# Patient Record
Sex: FEMALE | Race: BLACK OR AFRICAN AMERICAN | Employment: OTHER | URBAN - METROPOLITAN AREA
[De-identification: names, ages, dates, MRNs, and addresses within clinical notes are randomized per-mention and may not be internally consistent; named-entity substitution may affect disease eponyms.]

---

## 2017-01-11 ENCOUNTER — DOCUMENTATION ONLY (OUTPATIENT)
Dept: INTERNAL MEDICINE CLINIC | Age: 72
End: 2017-01-11

## 2017-03-02 ENCOUNTER — OFFICE VISIT (OUTPATIENT)
Dept: INTERNAL MEDICINE CLINIC | Age: 72
End: 2017-03-02

## 2017-03-02 VITALS
DIASTOLIC BLOOD PRESSURE: 76 MMHG | SYSTOLIC BLOOD PRESSURE: 130 MMHG | RESPIRATION RATE: 18 BRPM | HEART RATE: 89 BPM | WEIGHT: 187.4 LBS | BODY MASS INDEX: 30.12 KG/M2 | HEIGHT: 66 IN | TEMPERATURE: 97.6 F | OXYGEN SATURATION: 98 %

## 2017-03-02 DIAGNOSIS — T14.8XXA ABRASION: Primary | ICD-10-CM

## 2017-03-02 DIAGNOSIS — S83.92XA SPRAIN OF LEFT KNEE, UNSPECIFIED LIGAMENT, INITIAL ENCOUNTER: ICD-10-CM

## 2017-03-02 NOTE — PROGRESS NOTES
Subjective: Laurie Cesar is a 73 y.o. female who presents today with swelling and injury to left leg. She was at a service station yesterday and she misstepped in front of the counter to pay. She did not lose consciousness. She fell on her right shoulder and scraped her left knee. She noted some swelling in her left leg today. No pain with walking. Patient Active Problem List   Diagnosis Code    Hiatal hernia K44.9    History of seasonal allergies Z88.9    Depression F32.9    Anxiety F41.9    Osteoarthritis M19.90    Genital herpes A60.00    S/P arthroscopy of shoulder Z98.890    S/P cholecystectomy Z90.49    S/P LUCA-BSO Z90.710, Z90.722, Z90.79    S/P hemorrhoidectomy Z98.890, Z87.19    Neuropathy G62.9    Screen for colon cancer Z12.11    History of screening mammography Z92.89    S/P endoscopy Z98.890    Hyperlipidemia E78.5    Vitamin D deficiency E55.9    H/O bone density study Z92.89    DDD (degenerative disc disease), cervical M50.30     Current Outpatient Prescriptions   Medication Sig Dispense Refill    diclofenac potassium (CATAFLAM) 50 mg tablet TAKE 1 TABLET THREE TIMES A DAY AS NEEDED 270 Tab 0    sertraline (ZOLOFT) 50 mg tablet TAKE 1 TABLET DAILY 90 Tab 0    Cetirizine (ZYRTEC) 10 mg cap Take  by mouth as needed.  azelastine (ASTELIN) 137 mcg (0.1 %) nasal spray 1 [place] by Both Nostrils route two (2) times a day. Use in each nostril as directed 1 Bottle 5    omeprazole (PRILOSEC) 40 mg capsule Take 40 mg by mouth daily.  Cholecalciferol, Vitamin D3, (VITAMIN D3) 2,000 unit cap capsule Take 2,000 Units by mouth daily.  CARBOXYMETHYLCELLULOSE SODIUM (REFRESH OP) Administer 2 drops to both eyes as needed.  valACYclovir (VALTREX) 1 g tablet Take 0.5 Tabs by mouth two (2) times a day. 90 Tab 1    NASONEX 50 mcg/actuation nasal spray USE 2 SPRAYS IN EACH NOSTRIL DAILY 51 g 1        Review of Systems    Pertinent items are noted in HPI. Objective:     Visit Vitals    /76 (BP 1 Location: Left arm, BP Patient Position: Sitting)    Pulse 89    Temp 97.6 °F (36.4 °C) (Oral)    Resp 18    Ht 5' 5.5\" (1.664 m)    Wt 187 lb 6.4 oz (85 kg)    SpO2 98%    BMI 30.71 kg/m2     General appearance: alert, cooperative, no distress, appears stated age  Extremities: left anterior knee - there are 2 quarter sized abrasions. Granulation tissue already formed on lesions. Small effusion around left knee. Assessment/Plan:     1. Abrasion  -keep area clean and dry. Healing well. 2. Sprain of left knee, unspecified ligament, initial encounter  -elevated when sitting.    -ice for 20 minutes daily for next couple of days  -avoid standing for long periods of time in the next week. Follow-up Disposition:     Return if symptoms worsen or fail to improve. Advised patient to call back or return to office if symptoms worsen/change/persist.     Discussed expected course/resolution/complications of diagnosis in detail with patient. Medication risks/benefits/costs/interactions/alternatives discussed with patient. Patient was given an after visit summary which includes diagnoses, current medications, & vitals. Patient expressed understanding with the diagnosis and plan.

## 2017-03-02 NOTE — MR AVS SNAPSHOT
Visit Information Date & Time Provider Department Dept. Phone Encounter #  
 3/2/2017 12:00 PM MD [facility] [phone] Follow-up Instructions Return if symptoms worsen or fail to improve. Your Appointments 3/29/2017 10:40 AM  
ROUTINE CARE with MD [facility] ([facility]) Appt Note: 6 mths [address], [address] [place]  
[address], [address] [phone] Upcoming Health Maintenance Date Due Hepatitis C Screening 1945 Pneumococcal 65+ Low/Medium Risk (2 of 2 - PCV13) 1/15/2015 GLAUCOMA SCREENING Q2Y 1/8/2016 MEDICARE YEARLY EXAM 10/28/2016 COLONOSCOPY 2/10/2017 BREAST CANCER SCRN MAMMOGRAM 8/21/2017 DTaP/Tdap/Td series (2 - Td) 7/16/2024 Allergies as of 3/2/2017  Review Complete On: 3/2/2017 By: Xiomara Florentino MD  
  
 Severity Noted Reaction Type Reactions Codeine  10/17/2011    Itching Synthetic and GI. Itching under skin. Current Immunizations  Reviewed on 9/29/2016 Name Date Pneumococcal Polysaccharide (PPSV-23) 1/15/2014 TD Vaccine 1/1/1996 Tdap 7/16/2014 Not reviewed this visit Vitals BP  
  
  
  
  
  
 130/76 (BP 1 Location: Left arm, BP Patient Position: Sitting) Vitals History BMI and BSA Data Body Mass Index Body Surface Area 30.71 kg/m 2 1.98 m 2 Preferred Pharmacy Pharmacy Name Phone [id number] AT [address] [place] [address] [phone] Your Updated Medication List  
  
   
This list is accurate as of: 3/2/17 12:44 PM.  Always use your most recent med list.  
  
  
  
  
 azelastine 137 mcg (0.1 %) nasal spray Commonly known as:  ASTELIN  
1 Spray by Both Nostrils route two (2) times a day. Use in each nostril as directed  
  
 diclofenac potassium 50 mg tablet Commonly known as:  CATAFLAM  
TAKE 1 TABLET THREE TIMES A DAY AS NEEDED  
  
 NASONEX 50 mcg/actuation nasal spray Generic drug:  mometasone USE 2 SPRAYS IN EACH NOSTRIL DAILY  
  
 omeprazole 40 mg capsule Commonly known as:  PRILOSEC Take 40 mg by mouth daily. REFRESH OP Administer 2 drops to both eyes as needed. sertraline 50 mg tablet Commonly known as:  ZOLOFT  
TAKE 1 TABLET DAILY  
  
 valACYclovir 1 gram tablet Commonly known as:  VALTREX Take 0.5 Tabs by mouth two (2) times a day. VITAMIN D3 2,000 unit Cap capsule Generic drug:  Cholecalciferol (Vitamin D3) Take 2,000 Units by mouth daily. ZyrTEC 10 mg Cap Generic drug:  Cetirizine Take  by mouth as needed. Follow-up Instructions Return if symptoms worsen or fail to improve. Introducing [facility]! Dear Castro Lakhani: 
Thank you for requesting a [employer] account. Our records indicate that you already have an active [employer] account. You can access your account anytime at [email or web address] Did you know that you can access your hospital and ER discharge instructions at any time in [employer]? You can also review all of your test results from your hospital stay or ER visit. Additional Information If you have questions, please visit the Frequently Asked Questions section of the [employer] website at [email or web address] Remember, [employer] is NOT to be used for urgent needs. For medical emergencies, dial 911. Now available from your iPhone and Android! Please provide this summary of care documentation to your next provider. Your primary care clinician is listed as Sylvia Cuevas. If you have any questions after today's visit, please call [phone].

## 2017-03-02 NOTE — PROGRESS NOTES
Lisa Morgan is a 73 y.o. female  Chief Complaint   Patient presents with   Julien Schwab     yesterday, hurt left leg and shoulder     1. Have you been to the ER, urgent care clinic since your last visit? Hospitalized since your last visit? No    2. Have you seen or consulted any other health care providers outside of the [address] [place] [address] since your last visit? Include any pap smears or colon screening. Dr. Megan Womack, eye doctor, 6-8 weeks ago for annual exam and gluacoma screening.

## 2017-03-29 ENCOUNTER — OFFICE VISIT (OUTPATIENT)
Dept: INTERNAL MEDICINE CLINIC | Age: 72
End: 2017-03-29

## 2017-03-29 ENCOUNTER — HOSPITAL ENCOUNTER (OUTPATIENT)
Dept: LAB | Age: 72
Discharge: HOME | End: 2017-03-29
Payer: MEDICARE

## 2017-03-29 VITALS
SYSTOLIC BLOOD PRESSURE: 112 MMHG | OXYGEN SATURATION: 97 % | WEIGHT: 187.6 LBS | BODY MASS INDEX: 30.15 KG/M2 | DIASTOLIC BLOOD PRESSURE: 70 MMHG | RESPIRATION RATE: 16 BRPM | HEIGHT: 66 IN | HEART RATE: 72 BPM | TEMPERATURE: 97.6 F

## 2017-03-29 DIAGNOSIS — F41.8 DEPRESSION WITH ANXIETY: Primary | ICD-10-CM

## 2017-03-29 DIAGNOSIS — Z11.59 SCREENING FOR VIRAL DISEASE: ICD-10-CM

## 2017-03-29 DIAGNOSIS — E78.2 MIXED HYPERLIPIDEMIA: ICD-10-CM

## 2017-03-29 DIAGNOSIS — Z79.899 ENCOUNTER FOR LONG-TERM (CURRENT) DRUG USE: ICD-10-CM

## 2017-03-29 PROCEDURE — 36415 COLL VENOUS BLD VENIPUNCTURE: CPT

## 2017-03-29 PROCEDURE — 80061 LIPID PANEL: CPT

## 2017-03-29 PROCEDURE — 86803 HEPATITIS C AB TEST: CPT

## 2017-03-29 PROCEDURE — 80053 COMPREHEN METABOLIC PANEL: CPT

## 2017-03-29 PROCEDURE — 85025 COMPLETE CBC W/AUTO DIFF WBC: CPT

## 2017-03-29 RX ORDER — SERTRALINE HYDROCHLORIDE 100 MG/1
100 TABLET, FILM COATED ORAL DAILY
Qty: 90 TAB | Refills: 1 | Status: SHIPPED | OUTPATIENT
Start: 2017-03-29 | End: 2017-09-07 | Stop reason: SDUPTHER

## 2017-03-29 NOTE — PROGRESS NOTES
Chief Complaint   Patient presents with    Depression     6 month f/u      PHQ 2 / 9, over the last two weeks 3/29/2017   Little interest or pleasure in doing things More than half the days   Feeling down, depressed or hopeless Nearly every day   Total Score PHQ 2 5   Trouble falling or staying asleep, or sleeping too much Nearly every day   Feeling tired or having little energy Several days   Poor appetite or overeating Nearly every day   Feeling bad about yourself - or that you are a failure or have let yourself or your family down Not at all   Trouble concentrating on things such as school, work, reading or watching TV More than half the days   Moving or speaking so slowly that other people could have noticed; or the opposite being so fidgety that others notice Not at all   Thoughts of being better off dead, or hurting yourself in some way Not at all   PHQ 9 Score 14   How difficult have these problems made it for you to do your work, take care of your home and get along with others Somewhat difficult

## 2017-03-29 NOTE — PROGRESS NOTES
Subjective: Gay Mcbride is a 73 y.o. female who presents today for follow up of her depression with anxiety. Got prevnar 13 at [facility] on [address] [place] last year. She states that she has been short tempered. She is taking her zoloft. She states that it is better than before taking her zoloft, but still having mood swings and crying spells. She states that she does not have any extraordinary stressors. Patient Active Problem List   Diagnosis Code    Hiatal hernia K44.9    History of seasonal allergies Z88.9    Depression F32.9    Anxiety F41.9    Osteoarthritis M19.90    Genital herpes A60.00    S/P arthroscopy of shoulder Z98.890    S/P cholecystectomy Z90.49    S/P LUCA-BSO Z90.710, Z90.722, Z90.79    S/P hemorrhoidectomy Z98.890, Z87.19    Neuropathy G62.9    Screen for colon cancer Z12.11    History of screening mammography Z92.89    S/P endoscopy Z98.890    Hyperlipidemia E78.5    Vitamin D deficiency E55.9    H/O bone density study Z92.89    DDD (degenerative disc disease), cervical M50.30     Current Outpatient Prescriptions   Medication Sig Dispense Refill    sertraline (ZOLOFT) 100 mg tablet Take 1 Tab by mouth daily. 90 Tab 1    diclofenac potassium (CATAFLAM) 50 mg tablet TAKE 1 TABLET THREE TIMES A DAY AS NEEDED 270 Tab 0    Cetirizine (ZYRTEC) 10 mg cap Take  by mouth as needed.  omeprazole (PRILOSEC) 40 mg capsule Take 40 mg by mouth daily.  Cholecalciferol, Vitamin D3, (VITAMIN D3) 2,000 unit cap capsule Take 2,000 Units by mouth daily.  CARBOXYMETHYLCELLULOSE SODIUM (REFRESH OP) Administer 2 drops to both eyes as needed.  valACYclovir (VALTREX) 1 g tablet Take 0.5 Tabs by mouth two (2) times a day. 90 Tab 1        Review of Systems    Pertinent items are noted in HPI.      Objective:     Visit Vitals    /70 (BP 1 Location: Left arm, BP Patient Position: Sitting)    Pulse 72    Temp 97.6 °F (36.4 °C) (Oral)    Resp 16    Ht 5' 5.5\" (1.664 m)    Wt 187 lb 9.6 oz (85.1 kg)    SpO2 97%    BMI 30.74 kg/m2     General appearance: alert, cooperative, no distress, appears stated age  Head: Normocephalic, without obvious abnormality, atraumatic  Neck: supple, symmetrical, trachea midline, no adenopathy, no carotid bruit and no JVD  Lungs: clear to auscultation bilaterally  Heart: regular rate and rhythm, S1, S2 normal, no murmur, click, rub or gallop  Extremities: extremities normal, atraumatic, no cyanosis or edema  Pulses: 2+ and symmetric  Neurologic: Mental status: Alert, oriented, thought content appropriate, affect: stable    Assessment/Plan:     1. Depression with anxiety  -not well controlled at this time with mood swings and crying spells several days per week. -increase zoloft from 50mg daily to 100mg daily.    - METABOLIC PANEL, COMPREHENSIVE    2. Mixed hyperlipidemia  -diet controlled. - CBC WITH AUTOMATED DIFF  - METABOLIC PANEL, COMPREHENSIVE  - LIPID PANEL    3. Encounter for long-term (current) drug use      4. Screening for viral disease    - HEPATITIS C AB     Orders Placed This Encounter    CBC WITH AUTOMATED DIFF    METABOLIC PANEL, COMPREHENSIVE    LIPID PANEL    HEPATITIS C AB    sertraline (ZOLOFT) 100 mg tablet     Sig: Take 1 Tab by mouth daily. Dispense:  90 Tab     Refill:  1      Follow-up Disposition:     Follow up in 1 month     Return if symptoms worsen or fail to improve. Advised patient to call back or return to office if symptoms worsen/change/persist.     Discussed expected course/resolution/complications of diagnosis in detail with patient. Medication risks/benefits/costs/interactions/alternatives discussed with patient. Patient was given an after visit summary which includes diagnoses, current medications, & vitals. Patient expressed understanding with the diagnosis and plan.

## 2017-03-29 NOTE — MR AVS SNAPSHOT
Visit Information Date & Time Provider Department Dept. Phone Encounter #  
 3/29/2017 10:40 AM Michael Bacon MD [facility] [phone] Follow-up Instructions Return in about 4 weeks (around 4/26/2017). Upcoming Health Maintenance Date Due Hepatitis C Screening 1945 Pneumococcal 65+ Low/Medium Risk (2 of 2 - PCV13) 1/15/2015 GLAUCOMA SCREENING Q2Y 1/8/2016 MEDICARE YEARLY EXAM 10/28/2016 COLONOSCOPY 2/10/2017 BREAST CANCER SCRN MAMMOGRAM 8/21/2017 DTaP/Tdap/Td series (2 - Td) 7/16/2024 Allergies as of 3/29/2017  Review Complete On: 3/29/2017 By: Michael Bacon MD  
  
 Severity Noted Reaction Type Reactions Codeine  10/17/2011    Itching Synthetic and GI. Itching under skin. Current Immunizations  Reviewed on 3/29/2017 Name Date Pneumococcal Polysaccharide (PPSV-23) 1/15/2014 TD Vaccine 1/1/1996 Tdap 7/16/2014 Reviewed by Michael Bacon MD on 3/29/2017 at 11:27 AM  
You Were Diagnosed With   
  
 Codes Comments Depression with anxiety    -  Primary ICD-10-CM: F41.8 ICD-9-CM: 300.4 Mixed hyperlipidemia     ICD-10-CM: E78.2 ICD-9-CM: 272.2 Encounter for long-term (current) drug use     ICD-10-CM: Z79.899 ICD-9-CM: V58.69 Screening for viral disease     ICD-10-CM: Z11.59 
ICD-9-CM: V73.99 Vitals BP Pulse Temp Resp Height(growth percentile) Weight(growth percentile) 112/70 (BP 1 Location: Left arm, BP Patient Position: Sitting) 72 97.6 °F (36.4 °C) (Oral) 16 5' 5.5\" (1.664 m) 187 lb 9.6 oz (85.1 kg) SpO2 BMI OB Status Smoking Status 97% 30.74 kg/m2 Hysterectomy Current Every Day Smoker Vitals History BMI and BSA Data Body Mass Index Body Surface Area 30.74 kg/m 2 1.98 m 2 Preferred Pharmacy Pharmacy Name Phone [address] [facility] [phone] Your Updated Medication List  
  
   
 This list is accurate as of: 3/29/17 11:52 AM.  Always use your most recent med list.  
  
  
  
  
 diclofenac potassium 50 mg tablet Commonly known as:  CATAFLAM  
TAKE 1 TABLET THREE TIMES A DAY AS NEEDED  
  
 omeprazole 40 mg capsule Commonly known as:  PRILOSEC Take 40 mg by mouth daily. REFRESH OP Administer 2 drops to both eyes as needed. sertraline 100 mg tablet Commonly known as:  ZOLOFT Take 1 Tab by mouth daily. valACYclovir 1 gram tablet Commonly known as:  VALTREX Take 0.5 Tabs by mouth two (2) times a day. VITAMIN D3 2,000 unit Cap capsule Generic drug:  Cholecalciferol (Vitamin D3) Take 2,000 Units by mouth daily. ZyrTEC 10 mg Cap Generic drug:  Cetirizine Take  by mouth as needed. Prescriptions Sent to Pharmacy Refills  
 sertraline (ZOLOFT) 100 mg tablet 1 Sig: Take 1 Tab by mouth daily. Class: Normal  
 Pharmacy: [address], [address] Ph #: [phone] Route: Oral  
  
We Performed the Following CBC WITH AUTOMATED DIFF [01374 CPT(R)] HEPATITIS C AB [05585 CPT(R)] LIPID PANEL [15949 CPT(R)] METABOLIC PANEL, COMPREHENSIVE [77254 CPT(R)] Follow-up Instructions Return in about 4 weeks (around 4/26/2017). Introducing [facility]! Dear Nicholas Goldstein: 
Thank you for requesting a [employer] account. Our records indicate that you already have an active [employer] account. You can access your account anytime at [email or web address] Did you know that you can access your hospital and ER discharge instructions at any time in [employer]? You can also review all of your test results from your hospital stay or ER visit. Additional Information If you have questions, please visit the Frequently Asked Questions section of the [employer] website at [email or web address] Remember, [employer] is NOT to be used for urgent needs. For medical emergencies, dial 911. Now available from your iPhone and Android! Please provide this summary of care documentation to your next provider. Your primary care clinician is listed as Sylvia Cuevas. If you have any questions after today's visit, please call [phone].

## 2017-03-30 LAB
ALBUMIN SERPL-MCNC: 4.2 G/DL (ref 3.6–4.8)
ALBUMIN/GLOB SERPL: 1.6 {RATIO} (ref 1.2–2.2)
ALP SERPL-CCNC: 97 IU/L (ref 39–117)
ALT SERPL-CCNC: 9 IU/L (ref 0–32)
AST SERPL-CCNC: 12 IU/L (ref 0–40)
BASOPHILS # BLD AUTO: 0 X10E3/UL (ref 0–0.2)
BASOPHILS NFR BLD AUTO: 0 %
BILIRUB SERPL-MCNC: 0.3 MG/DL (ref 0–1.2)
BUN SERPL-MCNC: 11 MG/DL (ref 8–27)
BUN/CREAT SERPL: 17 (ref 11–26)
CALCIUM SERPL-MCNC: 8.9 MG/DL (ref 8.7–10.3)
CHLORIDE SERPL-SCNC: 102 MMOL/L (ref 96–106)
CHOLEST SERPL-MCNC: 217 MG/DL (ref 100–199)
CO2 SERPL-SCNC: 28 MMOL/L (ref 18–29)
CREAT SERPL-MCNC: 0.65 MG/DL (ref 0.57–1)
EOSINOPHIL # BLD AUTO: 0.2 X10E3/UL (ref 0–0.4)
EOSINOPHIL NFR BLD AUTO: 2 %
ERYTHROCYTE [DISTWIDTH] IN BLOOD BY AUTOMATED COUNT: 15.9 % (ref 12.3–15.4)
GLOBULIN SER CALC-MCNC: 2.7 G/DL (ref 1.5–4.5)
GLUCOSE SERPL-MCNC: 78 MG/DL (ref 65–99)
HCT VFR BLD AUTO: 37.2 % (ref 34–46.6)
HCV AB S/CO SERPL IA: <0.1 S/CO RATIO (ref 0–0.9)
HDLC SERPL-MCNC: 53 MG/DL
HGB BLD-MCNC: 12.1 G/DL (ref 11.1–15.9)
IMM GRANULOCYTES # BLD: 0 X10E3/UL (ref 0–0.1)
IMM GRANULOCYTES NFR BLD: 0 %
INTERPRETATION, 910389: NORMAL
LDLC SERPL CALC-MCNC: 138 MG/DL (ref 0–99)
LYMPHOCYTES # BLD AUTO: 3.2 X10E3/UL (ref 0.7–3.1)
LYMPHOCYTES NFR BLD AUTO: 48 %
MCH RBC QN AUTO: 26.7 PG (ref 26.6–33)
MCHC RBC AUTO-ENTMCNC: 32.5 G/DL (ref 31.5–35.7)
MCV RBC AUTO: 82 FL (ref 79–97)
MONOCYTES # BLD AUTO: 0.4 X10E3/UL (ref 0.1–0.9)
MONOCYTES NFR BLD AUTO: 6 %
NEUTROPHILS # BLD AUTO: 3 X10E3/UL (ref 1.4–7)
NEUTROPHILS NFR BLD AUTO: 44 %
PLATELET # BLD AUTO: 391 X10E3/UL (ref 150–379)
POTASSIUM SERPL-SCNC: 4.3 MMOL/L (ref 3.5–5.2)
PROT SERPL-MCNC: 6.9 G/DL (ref 6–8.5)
RBC # BLD AUTO: 4.53 X10E6/UL (ref 3.77–5.28)
SODIUM SERPL-SCNC: 144 MMOL/L (ref 134–144)
TRIGL SERPL-MCNC: 130 MG/DL (ref 0–149)
VLDLC SERPL CALC-MCNC: 26 MG/DL (ref 5–40)
WBC # BLD AUTO: 6.9 X10E3/UL (ref 3.4–10.8)

## 2017-04-26 ENCOUNTER — OFFICE VISIT (OUTPATIENT)
Dept: INTERNAL MEDICINE CLINIC | Age: 72
End: 2017-04-26

## 2017-04-26 VITALS
DIASTOLIC BLOOD PRESSURE: 70 MMHG | BODY MASS INDEX: 30.05 KG/M2 | TEMPERATURE: 98 F | WEIGHT: 187 LBS | SYSTOLIC BLOOD PRESSURE: 132 MMHG | OXYGEN SATURATION: 98 % | RESPIRATION RATE: 18 BRPM | HEART RATE: 75 BPM | HEIGHT: 66 IN

## 2017-04-26 DIAGNOSIS — Z00.00 ROUTINE GENERAL MEDICAL EXAMINATION AT A HEALTH CARE FACILITY: ICD-10-CM

## 2017-04-26 DIAGNOSIS — F32.A DEPRESSION, UNSPECIFIED DEPRESSION TYPE: Primary | ICD-10-CM

## 2017-04-26 DIAGNOSIS — Z13.39 SCREENING FOR ALCOHOLISM: ICD-10-CM

## 2017-04-26 PROBLEM — Z71.89 ADVANCE DIRECTIVE DISCUSSED WITH PATIENT: Chronic | Status: ACTIVE | Noted: 2017-04-26

## 2017-04-26 PROBLEM — Z71.89 ADVANCE DIRECTIVE DISCUSSED WITH PATIENT: Status: ACTIVE | Noted: 2017-04-26

## 2017-04-26 NOTE — PROGRESS NOTES
Chief Complaint   Patient presents with   [address] Cholesterol Problem    Annual Wellness Visit

## 2017-04-26 NOTE — PROGRESS NOTES
Subjective: Essence Hernández is a 73 y.o. female who presents today for her medicare wellness exam and follow up of her depression. Her zoloft was increased from 50mg to 100mg daily about 1 month ago. She was feeling very tearful and stressed at that time. Since increasing her dose, she is more relaxed and \"herself\" again. She has some congestion in her ears. No fever and chills. Patient Active Problem List   Diagnosis Code    Hiatal hernia K44.9    History of seasonal allergies Z88.9    Depression F32.9    Anxiety F41.9    Osteoarthritis M19.90    Genital herpes A60.00    S/P arthroscopy of shoulder Z98.890    S/P cholecystectomy Z90.49    S/P LUCA-BSO Z90.710, Z90.722, Z90.79    S/P hemorrhoidectomy Z98.890, Z87.19    Neuropathy G62.9    Screen for colon cancer Z12.11    History of screening mammography Z92.89    S/P endoscopy Z98.890    Hyperlipidemia E78.5    Vitamin D deficiency E55.9    H/O bone density study Z92.89    DDD (degenerative disc disease), cervical M50.30     Current Outpatient Prescriptions   Medication Sig Dispense Refill    varicella zoster vacine live (ZOSTAVAX) 19,400 unit/0.65 mL susr injection 1 Vial by SubCUTAneous route once as needed for up to 1 dose. 0.65 mL 0    sertraline (ZOLOFT) 100 mg tablet Take 1 Tab by mouth daily. 90 Tab 1    diclofenac potassium (CATAFLAM) 50 mg tablet TAKE 1 TABLET THREE TIMES A DAY AS NEEDED 270 Tab 0    Cetirizine (ZYRTEC) 10 mg cap Take  by mouth as needed.  omeprazole (PRILOSEC) 40 mg capsule Take 40 mg by mouth daily.  Cholecalciferol, Vitamin D3, (VITAMIN D3) 2,000 unit cap capsule Take 2,000 Units by mouth daily.  CARBOXYMETHYLCELLULOSE SODIUM (REFRESH OP) Administer 2 drops to both eyes as needed.  valACYclovir (VALTREX) 1 g tablet Take 0.5 Tabs by mouth two (2) times a day. 90 Tab 1        Review of Systems    Pertinent items are noted in HPI.      Objective:     Visit Vitals    /70  Pulse 75    Temp 98 °F (36.7 °C) (Oral)    Resp 18    Ht 5' 5.5\" (1.664 m)    Wt 187 lb (84.8 kg)    SpO2 98%    BMI 30.65 kg/m2     General appearance: alert, cooperative, no distress, appears stated age  Head: Normocephalic, without obvious abnormality, atraumatic  Ears: abnormal external canal AD - small amount nonobstructing cerumen in canal, abnormal TM AD - dull, abnormal external canal AS - small amount , nonobstructing cerumen in canal, abnormal TM AS - dull  Nose: Nares normal. Septum midline. Mucosa normal. No drainage or sinus tenderness. Throat: Lips, mucosa, and tongue normal. Teeth and gums normal and normal findings: oropharynx pink & moist without lesions or evidence of thrush  Neck: supple, symmetrical, trachea midline, no adenopathy, no carotid bruit and no JVD  Lungs: clear to auscultation bilaterally  Heart: regular rate and rhythm, S1, S2 normal, no murmur, click, rub or gallop  Neurologic: Mental status: Alert, oriented, thought content appropriate, affect: stable and normal    Assessment/Plan:     1. Depression, unspecified depression type  -depression much improved. - crying spells and mood swings have improved. -will continue her zoloft 100mg daily    2. Routine general medical examination at a health care facility  -medicare wellness exam completed today      Follow-up Disposition:     Follow up in 6 months     Return if symptoms worsen or fail to improve. Advised patient to call back or return to office if symptoms worsen/change/persist.     Discussed expected course/resolution/complications of diagnosis in detail with patient. Medication risks/benefits/costs/interactions/alternatives discussed with patient. Patient was given an after visit summary which includes diagnoses, current medications, & vitals. Patient expressed understanding with the diagnosis and plan. ------------------------------------------------------------------------------------------------------------------------------------------------------------------------------------------  This is a Subsequent Medicare Annual Wellness Visit providing Personalized Prevention Plan Services (PPPS) (Performed 12 months after initial AWV and PPPS )    I have reviewed the patient's medical history in detail and updated the computerized patient record. History     Past Medical History:   Diagnosis Date    Arthritis     DDD (degenerative disc disease), cervical 11/2016    GERD (gastroesophageal reflux disease)     History of screening mammography 5/3/2012    Neuropathy 11/3/2011    S/P arthroscopy of shoulder 11/3/2011    S/P cholecystectomy 11/3/2011    S/P hemorrhoidectomy 11/3/2011    S/P LUCA-BSO 11/3/2011    TMJ pain dysfunction syndrome     Unspecified adverse effect of anesthesia     loss of appetite after shoulder surgery      Past Surgical History:   Procedure Laterality Date    ENDOSCOPY, COLON, DIAGNOSTIC  2001    normal ([facility])    HX SHOULDER REPLACEMENT Bilateral      Current Outpatient Prescriptions   Medication Sig Dispense Refill    varicella zoster vacine live (ZOSTAVAX) 19,400 unit/0.65 mL susr injection 1 Vial by SubCUTAneous route once as needed for up to 1 dose. 0.65 mL 0    sertraline (ZOLOFT) 100 mg tablet Take 1 Tab by mouth daily. 90 Tab 1    diclofenac potassium (CATAFLAM) 50 mg tablet TAKE 1 TABLET THREE TIMES A DAY AS NEEDED 270 Tab 0    Cetirizine (ZYRTEC) 10 mg cap Take  by mouth as needed.  omeprazole (PRILOSEC) 40 mg capsule Take 40 mg by mouth daily.  Cholecalciferol, Vitamin D3, (VITAMIN D3) 2,000 unit cap capsule Take 2,000 Units by mouth daily.  CARBOXYMETHYLCELLULOSE SODIUM (REFRESH OP) Administer 2 drops to both eyes as needed.  valACYclovir (VALTREX) 1 g tablet Take 0.5 Tabs by mouth two (2) times a day.  90 Tab 1     Allergies   Allergen Reactions    Codeine Itching     Synthetic and GI. Itching under skin.      Family History   Problem Relation Age of Onset    Heart Disease Mother     Cancer Mother      liver    Hypertension Father     Stroke Father     Cancer Sister      colon    Cancer Brother      pancreatic     Social History   Substance Use Topics    Smoking status: Current Every Day Smoker     Packs/day: 0.25     Types: Cigarettes    Smokeless tobacco: Never Used    Alcohol use No     Patient Active Problem List   Diagnosis Code    Hiatal hernia K44.9    History of seasonal allergies Z88.9    Depression F32.9    Anxiety F41.9    Osteoarthritis M19.90    Genital herpes A60.00    S/P arthroscopy of shoulder Z98.890    S/P cholecystectomy Z90.49    S/P LUCA-BSO Z90.710, Z90.722, Z90.79    S/P hemorrhoidectomy Z98.890, Z87.19    Neuropathy G62.9    Screen for colon cancer Z12.11    History of screening mammography Z92.89    S/P endoscopy Z98.890    Hyperlipidemia E78.5    Vitamin D deficiency E55.9    H/O bone density study Z92.89    DDD (degenerative disc disease), cervical M50.30       Depression Risk Factor Screening:     PHQ 2 / 9, over the last two weeks 3/29/2017   Little interest or pleasure in doing things More than half the days   Feeling down, depressed or hopeless Nearly every day   Total Score PHQ 2 5   Trouble falling or staying asleep, or sleeping too much Nearly every day   Feeling tired or having little energy Several days   Poor appetite or overeating Nearly every day   Feeling bad about yourself - or that you are a failure or have let yourself or your family down Not at all   Trouble concentrating on things such as school, work, reading or watching TV More than half the days   Moving or speaking so slowly that other people could have noticed; or the opposite being so fidgety that others notice Not at all   Thoughts of being better off dead, or hurting yourself in some way Not at all   PHQ 9 Score 14 How difficult have these problems made it for you to do your work, take care of your home and get along with others Somewhat difficult     Alcohol Risk Factor Screening: On any occasion during the past 3 months, have you had more than 3 drinks containing alcohol? No    Do you average more than 7 drinks per week? No      Functional Ability and Level of Safety:     Hearing Loss   normal-to-mild    Activities of Daily Living   Self-care. Requires assistance with: no ADLs    Fall Risk     Fall Risk Assessment, last 12 mths 3/29/2017   Able to walk? Yes   Fall in past 12 months? Yes   Fall with injury? Yes   Number of falls in past 12 months 1   Fall Risk Score 2     Abuse Screen   Patient is not abused    Review of Systems   A comprehensive review of systems was negative except for that written in the HPI. Physical Examination     Evaluation of Cognitive Function:  Mood/affect:  happy  Appearance: age appropriate  Family member/caregiver input: n/a    See exam noted above. Patient Care Team:  Jcarlos Perez MD as PCP - General (Internal Medicine)  Kashif Spears MD as Physician (Gastroenterology)  Berhane Mayes MD as Physician (Orthopedic Surgery)    Advice/Referrals/Counseling   Education and counseling provided:  Are appropriate based on today's review and evaluation      Assessment/Plan   1. Health Care Maintenance      *Screening mammogram - last done at [facility] in 2015. Over due. Informed patient that I recommend she have this testing done yearly. She will   schedule. *DXA -done 12/2015 - normal  *Screening colonoscopy -Dr. Mylene Tena - done in 2013 - due this year due to family history of colon cancer. Patient will call for appointment  *Eye Exam - completed with Dr. Gt Wen - yearly. Done earlier this year. *Advanced Directive - -patient has an established AD. Will bring copy in for chart. *Immunizations -due for zoster - script given to patient.     Orders Placed This Encounter    varicella zoster vacine live (ZOSTAVAX) 19,400 unit/0.65 mL susr injection     Si Vial by SubCUTAneous route once as needed for up to 1 dose.      Dispense:  0.65 mL     Refill:  0      .

## 2017-04-26 NOTE — MR AVS SNAPSHOT
Visit Information Date & Time Provider Department Dept. Phone Encounter #  
 4/26/2017  9:40 AM Indra Xiong MD [place] [id number] [facility] [phone] [id number] Follow-up Instructions Return in about 6 months (around 10/26/2017). Upcoming Health Maintenance Date Due  
 GLAUCOMA SCREENING Q2Y 1/8/2016 MEDICARE YEARLY EXAM 10/28/2016 COLONOSCOPY 2/10/2017 BREAST CANCER SCRN MAMMOGRAM 8/21/2017 DTaP/Tdap/Td series (2 - Td) 7/16/2024 Allergies as of 4/26/2017  Review Complete On: 4/26/2017 By: Roxanne Salmon LPN Severity Noted Reaction Type Reactions Codeine  10/17/2011    Itching Synthetic and GI. Itching under skin. Current Immunizations  Reviewed on 3/29/2017 Name Date Pneumococcal Conjugate (PCV-13) 11/21/2016 Pneumococcal Polysaccharide (PPSV-23) 1/15/2014 TD Vaccine 1/1/1996 Tdap 7/16/2014 Not reviewed this visit You Were Diagnosed With   
  
 Codes Comments Depression, unspecified depression type    -  Primary ICD-10-CM: F32.9 ICD-9-CM: 467 Vitals BP Pulse Temp Resp Height(growth percentile) Weight(growth percentile) 132/70 75 98 °F (36.7 °C) (Oral) 18 5' 5.5\" (1.664 m) 187 lb (84.8 kg) SpO2 BMI OB Status Smoking Status 98% 30.65 kg/m2 Hysterectomy Current Every Day Smoker Vitals History BMI and BSA Data Body Mass Index Body Surface Area  
 30.65 kg/m 2 1.98 m 2 Preferred Pharmacy Pharmacy Name Phone Harshad Elizalde [facility] [phone] Your Updated Medication List  
  
   
This list is accurate as of: 4/26/17 10:20 AM.  Always use your most recent med list.  
  
  
  
  
 diclofenac potassium 50 mg tablet Commonly known as:  CATAFLAM  
TAKE 1 TABLET THREE TIMES A DAY AS NEEDED  
  
 omeprazole 40 mg capsule Commonly known as:  PRILOSEC Take 40 mg by mouth daily.   
  
 REFRESH OP  
 Administer 2 drops to both eyes as needed. sertraline 100 mg tablet Commonly known as:  ZOLOFT Take 1 Tab by mouth daily. valACYclovir 1 gram tablet Commonly known as:  VALTREX Take 0.5 Tabs by mouth two (2) times a day. VITAMIN D3 2,000 unit Cap capsule Generic drug:  Cholecalciferol (Vitamin D3) Take 2,000 Units by mouth daily. ZyrTEC 10 mg Cap Generic drug:  Cetirizine Take  by mouth as needed. Follow-up Instructions Return in about 6 months (around 10/26/2017). Introducing [facility]! Dear \[facility] [place] [facility]\"": 
Thank you for requesting a Bliips account. Our records indicate that you already have an active Bliips account. You can access your account anytime at [email or web address] Did you know that you can access your hospital and ER discharge instructions at any time in Bliips? You can also review all of your test results from your hospital stay or ER visit. Additional Information If you have questions, please visit the Frequently Asked Questions section of the Bliips website at [email or web address] Remember, Bliips is NOT to be used for urgent needs. For medical emergencies, dial 911. Now available from your iPhone and Android! Please provide this summary of care documentation to your next provider. Your primary care clinician is listed as Sylvia Cuevas. If you have any questions after today's visit, please call [phone].

## 2017-04-28 ENCOUNTER — HOSPITAL ENCOUNTER (OUTPATIENT)
Dept: MAMMOGRAPHY | Age: 72
Discharge: HOME | End: 2017-04-28
Attending: INTERNAL MEDICINE
Payer: MEDICARE

## 2017-04-28 DIAGNOSIS — Z12.31 VISIT FOR SCREENING MAMMOGRAM: ICD-10-CM

## 2017-04-28 PROCEDURE — 77067 SCR MAMMO BI INCL CAD: CPT

## 2017-08-09 RX ORDER — VALACYCLOVIR HYDROCHLORIDE 1 G/1
500 TABLET, FILM COATED ORAL 2 TIMES DAILY
Qty: 90 TAB | Refills: 0 | Status: SHIPPED | OUTPATIENT
Start: 2017-08-09 | End: 2022-10-13

## 2017-08-09 NOTE — TELEPHONE ENCOUNTER
Last office visit- 4/26/17  Next office visit- 10/26/17  Last Refill- 4/28/14    Requested Prescriptions     Pending Prescriptions Disp Refills    valACYclovir (VALTREX) 1 gram tablet 90 Tab 1     Sig: Take 0.5 Tabs by mouth two (2) times a day.

## 2017-08-30 RX ORDER — DICLOFENAC POTASSIUM 50 MG/1
TABLET, FILM COATED ORAL
Qty: 270 TAB | Refills: 0 | Status: SHIPPED | OUTPATIENT
Start: 2017-08-30 | End: 2019-04-10

## 2017-10-17 ENCOUNTER — OFFICE VISIT (OUTPATIENT)
Dept: INTERNAL MEDICINE CLINIC | Age: 72
End: 2017-10-17

## 2017-10-17 VITALS
WEIGHT: 184 LBS | TEMPERATURE: 97.3 F | BODY MASS INDEX: 29.57 KG/M2 | OXYGEN SATURATION: 99 % | SYSTOLIC BLOOD PRESSURE: 130 MMHG | DIASTOLIC BLOOD PRESSURE: 90 MMHG | HEIGHT: 66 IN | HEART RATE: 70 BPM | RESPIRATION RATE: 15 BRPM

## 2017-10-17 DIAGNOSIS — Z79.899 ENCOUNTER FOR LONG-TERM (CURRENT) USE OF MEDICATIONS: ICD-10-CM

## 2017-10-17 DIAGNOSIS — J01.10 ACUTE NON-RECURRENT FRONTAL SINUSITIS: Primary | ICD-10-CM

## 2017-10-17 DIAGNOSIS — F32.A DEPRESSION, UNSPECIFIED DEPRESSION TYPE: ICD-10-CM

## 2017-10-17 RX ORDER — AMOXICILLIN 500 MG/1
500 CAPSULE ORAL 2 TIMES DAILY
Qty: 20 CAP | Refills: 0 | Status: SHIPPED | OUTPATIENT
Start: 2017-10-17 | End: 2017-10-27

## 2017-10-17 RX ORDER — METHYLPREDNISOLONE 4 MG/1
TABLET ORAL
Qty: 1 DOSE PACK | Refills: 0 | Status: SHIPPED | OUTPATIENT
Start: 2017-10-17 | End: 2019-04-10

## 2017-10-17 NOTE — PROGRESS NOTES
Subjective: Jessu Uriarte is a 81 y.o. female who presents today for follow up of her depression and also left side temple head pain. Started about 2 days ago. Has noted some ear congestion and pain as well. Has pain in her right teeth. No fevers or chills. Some congestion. Some visual changes. Hurts to touch left temple    As for her depression, she is quite stable. Going on a [employer] retreat in the next week. She is active with her community and family. She sleeps well and appetite is stable. She is taking her zoloft 100mg daily without any problems. She has a very supportive family. Patient Active Problem List   Diagnosis Code    Hiatal hernia K44.9    History of seasonal allergies Z88.9    Depression F32.9    Anxiety F41.9    Osteoarthritis M19.90    Genital herpes A60.00    S/P arthroscopy of shoulder Z98.890    S/P cholecystectomy Z90.49    S/P LUCA-BSO Z90.710, Z90.722, Z90.79    S/P hemorrhoidectomy Z98.890, Z87.19    Neuropathy G62.9    Screen for colon cancer Z12.11    History of screening mammography Z92.89    S/P endoscopy Z98.890    Hyperlipidemia E78.5    Vitamin D deficiency E55.9    H/O bone density study Z92.89    DDD (degenerative disc disease), cervical M50.30    Advance directive discussed with patient Z71.89     Current Outpatient Prescriptions   Medication Sig Dispense Refill    amoxicillin (AMOXIL) 500 mg capsule Take 1 Cap by mouth two (2) times a day for 10 days. 20 Cap 0    methylPREDNISolone (MEDROL DOSEPACK) 4 mg tablet Use as directed 1 Dose Pack 0    diclofenac potassium (CATAFLAM) 50 mg tablet TAKE 1 TABLET THREE TIMES A DAY AS NEEDED 270 Tab 0    valACYclovir (VALTREX) 1 gram tablet Take 0.5 Tabs by mouth two (2) times a day. 90 Tab 0    omeprazole (PRILOSEC) 40 mg capsule Take 40 mg by mouth daily.  Cholecalciferol, Vitamin D3, (VITAMIN D3) 2,000 unit cap capsule Take 2,000 Units by mouth daily.       CARBOXYMETHYLCELLULOSE SODIUM (REFRESH OP) Administer 2 drops to both eyes as needed.  sertraline (ZOLOFT) 100 mg tablet TAKE 1 TABLET DAILY 90 Tab 0        Review of Systems    Pertinent items are noted in HPI. Objective:     Visit Vitals    /90 (BP 1 Location: Left arm, BP Patient Position: Sitting)    Pulse 70    Temp 97.3 °F (36.3 °C) (Oral)    Resp 15    Ht 5' 5.5\" (1.664 m)    Wt 184 lb (83.5 kg)    SpO2 99%    BMI 30.15 kg/m2     General appearance: alert, cooperative, no distress, appears stated age  Head: Normocephalic, without obvious abnormality, atraumatic  Eyes: negative findings: anicteric sclera, lids and lashes normal and pupils equal, round, reactive to light and accomodation  Ears: abnormal external canal AD - debris in canal, abnormal external canal AS - debris in canal.  Tender to manipulation of left auricle  Nose: Nares normal. Septum midline. Mucosa normal. No drainage or sinus tenderness. Throat: Lips, mucosa, and tongue normal. Teeth and gums normal and normal findings: oropharynx pink & moist without lesions or evidence of thrush  Neck: supple, symmetrical, trachea midline, no adenopathy, no carotid bruit and no JVD  Lungs: clear to auscultation bilaterally  Heart: regular rate and rhythm, S1, S2 normal, no murmur, click, rub or gallop    Assessment/Plan:     1. Acute non-recurrent frontal sinusitis  -treat with course of amoxicillin 500mg bid for 10 days  -medrol dose pack    2. Depression, unspecified depression type  -I evaluated and recommended to continue current doses of medications. 3. Encounter for long-term (current) use of medications    Orders Placed This Encounter    amoxicillin (AMOXIL) 500 mg capsule     Sig: Take 1 Cap by mouth two (2) times a day for 10 days.      Dispense:  20 Cap     Refill:  0    methylPREDNISolone (MEDROL DOSEPACK) 4 mg tablet     Sig: Use as directed     Dispense:  1 Dose Pack     Refill:  0         Follow-up Disposition:     Follow up in 6 months for her depression. Will need labs at that time. Return if symptoms worsen or fail to improve. Advised patient to call back or return to office if symptoms worsen/change/persist.     Discussed expected course/resolution/complications of diagnosis in detail with patient. Medication risks/benefits/costs/interactions/alternatives discussed with patient. Patient was given an after visit summary which includes diagnoses, current medications, & vitals. Patient expressed understanding with the diagnosis and plan.

## 2017-10-17 NOTE — PROGRESS NOTES
Chief Complaint   Patient presents with    Head Pain     Pt c/o lightheadedness and dizziness. Pt states the headache started about 5 days with more pain on the right side. Her eyes are sensitive to light. She also has a tingling sensation in nose and lips. 1. Have you been to the ER, urgent care clinic since your last visit? Hospitalized since your last visit? No    2. Have you seen or consulted any other health care providers outside of the [address] [place] [address] since your last visit? Include any pap smears or colon screening.  No

## 2017-10-17 NOTE — MR AVS SNAPSHOT
Visit Information Date & Time Provider Department Dept. Phone Encounter #  
 10/17/2017 11:00 AM MD [facility] [phone] [id number] Follow-up Instructions Return in about 6 months (around 4/17/2018). Your Appointments 10/26/2017 11:20 AM  
ROUTINE CARE with MD [facility] ([facility]) Appt Note: 6m fu  
 [address], [place] [address] [place] [address]  
[address], [place] [address] [id number] Upcoming Health Maintenance Date Due COLONOSCOPY 2/10/2017 MEDICARE YEARLY EXAM 4/27/2018 GLAUCOMA SCREENING Q2Y 1/1/2019 BREAST CANCER SCRN MAMMOGRAM 4/28/2019 DTaP/Tdap/Td series (2 - Td) 7/16/2024 Allergies as of 10/17/2017  Review Complete On: 10/17/2017 By: Hanna Lambert MD  
  
 Severity Noted Reaction Type Reactions Codeine  10/17/2011    Itching Synthetic and GI. Itching under skin. Current Immunizations  Reviewed on 4/26/2017 Name Date Pneumococcal Conjugate (PCV-13) 11/21/2016 Pneumococcal Polysaccharide (PPSV-23) 1/15/2014 TD Vaccine 1/1/1996 Tdap 7/16/2014 Not reviewed this visit Vitals BP Pulse Temp Resp Height(growth percentile) Weight(growth percentile) 130/90 (BP 1 Location: Left arm, BP Patient Position: Sitting) 70 97.3 °F (36.3 °C) (Oral) 15 5' 5.5\" (1.664 m) 184 lb (83.5 kg) SpO2 BMI OB Status Smoking Status 99% 30.15 kg/m2 Hysterectomy Current Every Day Smoker Vitals History BMI and BSA Data Body Mass Index Body Surface Area  
 30.15 kg/m 2 1.96 m 2 Preferred Pharmacy Pharmacy Name Phone [address] [place] [phone] Your Updated Medication List  
  
   
This list is accurate as of: 10/17/17 11:37 AM.  Always use your most recent med list.  
  
  
  
  
 amoxicillin 500 mg capsule Commonly known as:  AMOXIL Take 1 Cap by mouth two (2) times a day for 10 days. diclofenac potassium 50 mg tablet Commonly known as:  CATAFLAM  
TAKE 1 TABLET THREE TIMES A DAY AS NEEDED  
  
 methylPREDNISolone 4 mg tablet Commonly known as:  Danella Zulma Use as directed  
  
 omeprazole 40 mg capsule Commonly known as:  PRILOSEC Take 40 mg by mouth daily. REFRESH OP Administer 2 drops to both eyes as needed. sertraline 100 mg tablet Commonly known as:  ZOLOFT  
TAKE 1 TABLET DAILY  
  
 valACYclovir 1 gram tablet Commonly known as:  VALTREX Take 0.5 Tabs by mouth two (2) times a day. VITAMIN D3 2,000 unit Cap capsule Generic drug:  Cholecalciferol (Vitamin D3) Take 2,000 Units by mouth daily. Prescriptions Sent to Pharmacy Refills  
 amoxicillin (AMOXIL) 500 mg capsule 0 Sig: Take 1 Cap by mouth two (2) times a day for 10 days. Class: Normal  
 Pharmacy: [address] Ph #: [phone] Route: Oral  
 methylPREDNISolone (MEDROL DOSEPACK) 4 mg tablet 0 Sig: Use as directed Class: Normal  
 Pharmacy: [address] Ph #: [phone] Follow-up Instructions Return in about 6 months (around 4/17/2018). Introducing [facility]! Dear [facility]: 
Thank you for requesting a Amen. account. Our records indicate that you already have an active Amen. account. You can access your account anytime at [email or web address] Did you know that you can access your hospital and ER discharge instructions at any time in Amen.? You can also review all of your test results from your hospital stay or ER visit. Additional Information If you have questions, please visit the Frequently Asked Questions section of the Amen. website at [email or web address] Remember, [employer] is NOT to be used for urgent needs. For medical emergencies, dial 911. Now available from your iPhone and Android! Please provide this summary of care documentation to your next provider. Your primary care clinician is listed as Sylvia Cuevas. If you have any questions after today's visit, please call [phone].

## 2018-01-31 ENCOUNTER — HOSPITAL ENCOUNTER (OUTPATIENT)
Dept: MRI IMAGING | Age: 73
Discharge: HOME | End: 2018-01-31
Attending: PHYSICAL MEDICINE & REHABILITATION
Payer: MEDICARE

## 2018-01-31 DIAGNOSIS — M54.42 BILATERAL LOW BACK PAIN WITH LEFT-SIDED SCIATICA: ICD-10-CM

## 2018-01-31 DIAGNOSIS — R20.1 HYPOESTHESIA OF SKIN: ICD-10-CM

## 2018-01-31 DIAGNOSIS — M54.41 BILATERAL LOW BACK PAIN WITH RIGHT-SIDED SCIATICA: ICD-10-CM

## 2018-01-31 DIAGNOSIS — M47.817 LUMBOSACRAL SPONDYLOSIS WITHOUT MYELOPATHY: ICD-10-CM

## 2018-01-31 DIAGNOSIS — M54.2 CERVICALGIA: ICD-10-CM

## 2018-01-31 DIAGNOSIS — M43.16 SPONDYLOLISTHESIS OF LUMBAR REGION: ICD-10-CM

## 2018-01-31 DIAGNOSIS — M47.812 CERVICAL SPONDYLOSIS WITHOUT MYELOPATHY: ICD-10-CM

## 2018-01-31 PROCEDURE — 72148 MRI LUMBAR SPINE W/O DYE: CPT

## 2018-01-31 PROCEDURE — 72141 MRI NECK SPINE W/O DYE: CPT

## 2018-03-26 ENCOUNTER — HOSPITAL ENCOUNTER (OUTPATIENT)
Dept: MRI IMAGING | Age: 73
Discharge: HOME | End: 2018-03-26
Attending: INTERNAL MEDICINE
Payer: MEDICARE

## 2018-03-26 DIAGNOSIS — N39.498 OTHER URINARY INCONTINENCE: ICD-10-CM

## 2018-03-26 DIAGNOSIS — R41.3 MEMORY LOSS: ICD-10-CM

## 2018-03-26 DIAGNOSIS — R26.89 BALANCE PROBLEM: ICD-10-CM

## 2018-03-26 PROCEDURE — 70551 MRI BRAIN STEM W/O DYE: CPT

## 2019-04-10 ENCOUNTER — OFFICE VISIT (OUTPATIENT)
Dept: NEUROLOGY | Age: 74
End: 2019-04-10

## 2019-04-10 VITALS
DIASTOLIC BLOOD PRESSURE: 80 MMHG | SYSTOLIC BLOOD PRESSURE: 120 MMHG | HEIGHT: 66 IN | HEART RATE: 88 BPM | OXYGEN SATURATION: 98 % | RESPIRATION RATE: 14 BRPM | BODY MASS INDEX: 29.73 KG/M2 | WEIGHT: 185 LBS

## 2019-04-10 DIAGNOSIS — E53.8 B12 DEFICIENCY: ICD-10-CM

## 2019-04-10 DIAGNOSIS — D47.2 MGUS (MONOCLONAL GAMMOPATHY OF UNKNOWN SIGNIFICANCE): ICD-10-CM

## 2019-04-10 DIAGNOSIS — G63 POLYNEUROPATHY ASSOCIATED WITH UNDERLYING DISEASE (HCC): Primary | ICD-10-CM

## 2019-04-10 DIAGNOSIS — M72.2 PLANTAR FASCIITIS: ICD-10-CM

## 2019-04-10 RX ORDER — LISINOPRIL 5 MG/1
10 TABLET ORAL EVERY MORNING
COMMUNITY
Start: 2019-04-02 | End: 2022-10-13

## 2019-04-10 RX ORDER — DICLOFENAC SODIUM 75 MG/1
75 TABLET, DELAYED RELEASE ORAL 2 TIMES DAILY
COMMUNITY
Start: 2019-04-02 | End: 2022-03-11

## 2019-04-10 RX ORDER — CYANOCOBALAMIN 1000 UG/ML
1000 INJECTION, SOLUTION INTRAMUSCULAR; SUBCUTANEOUS
COMMUNITY
Start: 2019-01-09 | End: 2022-10-13

## 2019-04-10 RX ORDER — GABAPENTIN 100 MG/1
CAPSULE ORAL 3 TIMES DAILY
Status: ON HOLD | COMMUNITY
Start: 2019-04-03 | End: 2021-05-27

## 2019-04-10 RX ORDER — ATORVASTATIN CALCIUM 20 MG/1
20 TABLET, FILM COATED ORAL DAILY
COMMUNITY
Start: 2019-04-02

## 2019-04-10 RX ORDER — AMITRIPTYLINE HYDROCHLORIDE 25 MG/1
50 TABLET, FILM COATED ORAL
COMMUNITY
Start: 2019-02-28 | End: 2019-07-25 | Stop reason: SDUPTHER

## 2019-04-10 NOTE — PROGRESS NOTES
Ms. Dexter Dolan presents as a new patient for evaluation of numbness and tingling of hands and feet.

## 2019-04-10 NOTE — PROGRESS NOTES
Neurology Consult Note      HISTORY PROVIDED BY: patient    Chief Complaint:   Chief Complaint   Patient presents with    Neurologic Problem      Subjective: Sandralee Dubin is a 72 y.o. left handed female who presents in consultation for a second opinion regarding neuropathy. Pt reports onset of sxs 3 years ago. She saw her PCP who suggested she had a food allergy. She was seen by Dr. Arpita Vail and was found to have PN and was referred to a neurologist at [address] [place] [address]. She was tried on gabapentin which caused swelling and did not help  She is now on amitriptyline 25mg qhs and just had gabapentin added. She reports tingling and numbnes in face, nose, cheek bones, hands in their entirety, and feet to ankles. Has burning pain in mornings on bottom of feet, feels like she is walking on hot coals. Pain improves as she is up and moving, doesn't resolve completely. She does not experience pain at night. She was found to have B12 deficiency about a year ago, no h/o DM, she did have work up for other causes of neuropathy by the neurologist, was found to have \"an abnormal protein\" and sent to hematology. The hematologist told her that she has MGUS, she has yearly f/u. No h/o cancer. Pt c/o memory loss, did have nocturia but improved. Notes from [facility] requested and received: Office visit 3/15/2019 patient complained of increased paresthesias in her hands and feet for the past several weeks. Occasionally drops objects. Mentions recent hematology appointment with Dr. Scott Mario and was told her MGUS was stable. She was on amitriptyline 50 mg nightly gabapentin was added 100 mg 3 times daily. Other notes mention failed duloxetine. Had been on gabapentin 600mg tid causing confusion and hand and feet swelling.   First office visit 4/3/2018 -mentions MRI brain without contrast 3/26/2018 was normal.  Reference is a report from an EMG done at [address] revealing sensory greater than motor axonal and demyelinating polyneuropathy without evidence of cervical radiculopathy. Labs 7/13/2018 B12 547, SPEP with positive M spike 0.2, , homocystine 10.4. Notes from [facility]-initial consultation 8/10/2018 with Esperanza Gohtra MD- MGUS diagnosed. Labs 2/27/19 CBC unremarkable 3/5/19 CMP-sodium 146 otherwise unremarkable. Bone survey 8/17/2018-no lytic or blastic lesion seen degenerative changes in the spine specially and thoracic spine seen. Notes from PCP Dr. Alen Reid -office visit 3/22/2019: Noted to continue to receive B12 injections. Labs 2/22/2019 CMP unremarkable hemoglobin A1c 5.4, , free T4 1.08, TSH 1.07. Lab 1/9/2018 B12 100.     Past Medical History:   Diagnosis Date    Arthritis     B12 deficiency     DDD (degenerative disc disease), cervical 11/2016    GERD (gastroesophageal reflux disease)     History of screening mammography 5/3/2012    MGUS (monoclonal gammopathy of unknown significance)     Neuropathy 11/3/2011    S/P arthroscopy of shoulder 11/3/2011    S/P cholecystectomy 11/3/2011    S/P hemorrhoidectomy 11/3/2011    S/P LUCA-BSO 11/3/2011    TMJ pain dysfunction syndrome     Unspecified adverse effect of anesthesia     loss of appetite after shoulder surgery      Past Surgical History:   Procedure Laterality Date    ENDOSCOPY, COLON, DIAGNOSTIC  2001    normal ([facility])    HX SHOULDER REPLACEMENT Bilateral       Social History     Socioeconomic History    Marital status:      Spouse name: Not on file    Number of children: Not on file    Years of education: Not on file    Highest education level: Not on file   Occupational History    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   Social Needs    Financial resource strain: Not on file    Food insecurity:     Worry: Not on file     Inability: Not on file    Transportation needs:     Medical: Not on file     Non-medical: Not on file   Tobacco Use    Smoking status: Current Every Day Smoker     Packs/day: 0.25 Types: Cigarettes    Smokeless tobacco: Never Used   Substance and Sexual Activity    Alcohol use: No    Drug use: No    Sexual activity: Never   Lifestyle    Physical activity:     Days per week: Not on file     Minutes per session: Not on file    Stress: Not on file   Relationships    Social connections:     Talks on phone: Not on file     Gets together: Not on file     Attends [employer]ism service: Not on file     Active member of club or organization: Not on file     Attends meetings of clubs or organizations: Not on file     Relationship status: Not on file    Intimate partner violence:     Fear of current or ex partner: Not on file     Emotionally abused: Not on file     Physically abused: Not on file     Forced sexual activity: Not on file   Other Topics Concern    Not on file   Social History Narrative    Lives in [place] alone, GD goes to [employer] and visits on weekends     Family History   Problem Relation Age of Onset    Heart Disease Mother     Cancer Mother         liver    Hypertension Father     Stroke Father     Cancer Sister         colon    Cancer Brother         pancreatic    Breast Cancer Daughter 39    Breast Cancer Niece 61    Other Son         Dec drug OD         Objective:   Review of Systems   Constitutional: Negative. Poor appetite    HENT: Positive for tinnitus. Eyes: Negative. Spots in vision   Respiratory: Positive for shortness of breath. Snoring   Cardiovascular: Negative. Gastrointestinal: Negative. Genitourinary: Negative. Musculoskeletal: Positive for falls and joint pain. Skin: Negative. Neurological: Positive for dizziness, sensory change and weakness. Endo/Heme/Allergies: Negative. Psychiatric/Behavioral: Positive for memory loss. Allergies   Allergen Reactions    Codeine Itching     Synthetic and GI. Itching under skin.         Meds:  Outpatient Medications Prior to Visit   Medication Sig Dispense Refill    diclofenac potassium (CATAFLAM) 50 mg tablet TAKE 1 TABLET THREE TIMES A DAY AS NEEDED 270 Tab 0    valACYclovir (VALTREX) 1 gram tablet Take 0.5 Tabs by mouth two (2) times a day. 90 Tab 0    omeprazole (PRILOSEC) 40 mg capsule Take 40 mg by mouth daily.  Cholecalciferol, Vitamin D3, (VITAMIN D3) 2,000 unit cap capsule Take 2,000 Units by mouth daily.  CARBOXYMETHYLCELLULOSE SODIUM (REFRESH OP) Administer 2 drops to both eyes as needed.  sertraline (ZOLOFT) 100 mg tablet TAKE 1 TABLET DAILY 90 Tab 1    methylPREDNISolone (MEDROL DOSEPACK) 4 mg tablet Use as directed 1 Dose Pack 0     No facility-administered medications prior to visit. Imaging:  MRI Results (most recent):  Results from Hospital Encounter encounter on 03/26/18   MRI BRAIN WO CONT    Narrative EXAM:  MRI BRAIN WO CONT  INDICATION:  Other urinary incontinence, M 39.498, R 41.3, balance problem, R  26.89, pain in back of head. TECHNIQUE: Sagittal T1, axial FLAIR, T2,T1 and gradient echo images as well as  coronal T2 weighted images and axial diffusion weighted images of the head were  obtained. COMPARISON:  CT head 10/11/10  FINDINGS:  The ventricular size and configuration are normal.   No areas of abnormal signal in the cerebral hemispheres, brainstem or  cerebellum. No evidence of hemorrhage, mass, infarct or abnormal extra-axial fluid  collections. Flow voids are present in the vertebral basilar and carotid artery systems. The craniocervical junction is normal.   The structures at the cranial base including paranasal sinuses are unremarkable. Impression IMPRESSION: Normal MRI of the head. CT Results (most recent):     Results from [place] encounter on 10/11/10   CT HEAD WITHOUT CONTRAST    Narrative Final Report       ICD Codes / Adm. Diagnosis:    /   HEADACHE  Examination:  HEAD WO CON  - [id number] - Oct 11 2010 10:09AM  Accession No:  [id number]  Reason:  REASON: STROKE      REPORT:  Multiple axial images were obtained from the skull base to the vertex   without the use of intravenous contrast. Ventricles and sulci are normal for   patient's age. There is no midline shift or herniation. The examination is   negative for acute infarct, mass lesion, or hemorrhage. The visualized   portions of the petrous temporal bones, paranasal sinuses, and orbits are   unremarkable. IMPRESSION: No acute process. Interpreting/Reading Doctor: Shaggy Patel ([id number])  Transcribed: n/a on 10/11/2010  Approved: Shaggy Patel ([id number])  10/11/2010  Distribution:  Attending Doctor: Yajaira DIAZ   Alternate Doctor: Yajaira Jurado. JOE            Reviewed records in depict and Vault[facility] tab today    Lab Review   Results for orders placed or performed in visit on 03/29/17   CBC WITH AUTOMATED DIFF   Result Value Ref Range    WBC 6.9 3.4 - 10.8 x10E3/uL    RBC 4.53 3.77 - 5.28 x10E6/uL    HGB 12.1 11.1 - 15.9 g/dL    HCT 37.2 34.0 - 46.6 %    MCV 82 79 - 97 fL    MCH 26.7 26.6 - 33.0 pg    MCHC 32.5 31.5 - 35.7 g/dL    RDW 15.9 (H) 12.3 - 15.4 %    PLATELET 493 (H) 620 - 379 x10E3/uL    NEUTROPHILS 44 %    Lymphocytes 48 %    MONOCYTES 6 %    EOSINOPHILS 2 %    BASOPHILS 0 %    ABS. NEUTROPHILS 3.0 1.4 - 7.0 x10E3/uL    Abs Lymphocytes 3.2 (H) 0.7 - 3.1 x10E3/uL    ABS. MONOCYTES 0.4 0.1 - 0.9 x10E3/uL    ABS. EOSINOPHILS 0.2 0.0 - 0.4 x10E3/uL    ABS. BASOPHILS 0.0 0.0 - 0.2 x10E3/uL    IMMATURE GRANULOCYTES 0 %    ABS. IMM.  GRANS. 0.0 0.0 - 0.1 S38Z7/VH   METABOLIC PANEL, COMPREHENSIVE   Result Value Ref Range    Glucose 78 65 - 99 mg/dL    BUN 11 8 - 27 mg/dL    Creatinine 0.65 0.57 - 1.00 mg/dL    GFR est non-AA 91 >59 mL/min/1.73    GFR est  >59 mL/min/1.73    BUN/Creatinine ratio 17 11 - 26    Sodium 144 134 - 144 mmol/L    Potassium 4.3 3.5 - 5.2 mmol/L    Chloride 102 96 - 106 mmol/L    CO2 28 18 - 29 mmol/L    Calcium 8.9 8.7 - 10.3 mg/dL    Protein, total 6.9 6.0 - 8.5 g/dL    Albumin 4.2 3.6 - 4.8 g/dL    GLOBULIN, TOTAL 2.7 1.5 - 4.5 g/dL    A-G Ratio 1.6 1.2 - 2.2    Bilirubin, total 0.3 0.0 - 1.2 mg/dL    Alk. phosphatase 97 39 - 117 IU/L    AST (SGOT) 12 0 - 40 IU/L    ALT (SGPT) 9 0 - 32 IU/L   LIPID PANEL   Result Value Ref Range    Cholesterol, total 217 (H) 100 - 199 mg/dL    Triglyceride 130 0 - 149 mg/dL    HDL Cholesterol 53 >39 mg/dL    VLDL, calculated 26 5 - 40 mg/dL    LDL, calculated 138 (H) 0 - 99 mg/dL   HEPATITIS C AB   Result Value Ref Range    Hep C Virus Ab <0.1 0.0 - 0.9 s/co ratio   CVD REPORT   Result Value Ref Range    INTERPRETATION Note         Exam:  Visit Vitals  /80   Pulse 88   Resp 14   Ht 5' 5.5\" (1.664 m)   Wt 83.9 kg (185 lb)   SpO2 98%   BMI 30.32 kg/m²     General:  Alert, cooperative, no distress. Head:  Normocephalic, without obvious abnormality, atraumatic. Respiratory:  Heart:   Non labored breathing  Regular rate and rhythm, no murmurs   Neck:   2+ carotids, no bruits   Extremities: Warm, no cyanosis or edema. Tender to palpation on dorsum of feet   Pulses: 2+ radial pulses. Neurologic:  MS: Alert and oriented x 4, speech intact. Language - word finding, able to name repeat and follow commands. Attention and fund of knowledge appropriate. Recent and remote memory intact.   Cranial Nerves:  II: visual fields Full to confrontation   II: pupils Equal, round, reactive to light   II: optic disc    III,VII: ptosis none   III,IV,VI: extraocular muscles  EOMI, no nystagmus or diplopia   V: facial light touch sensation  normal   VII: facial muscle function   symmetric   VIII: hearing intact   IX: soft palate elevation  normal   XI: trapezius strength  5/5   XI: sternocleidomastoid strength 5/5   XII: tongue  Midline     Motor: normal bulk and tone, no tremor, Strength: 5/5 throughout, no PD  Sensory: intact to LT, PP - increased to PP in feet compared to hands  Coordination: FTN and HTS intact, DORIS intact,Romberg negative  Gait: normal gait, able to tandem walk  Reflexes: 2+ symmetric in UE, 1+ in LE, toes downgoing       Assessment/Plan   Pt is a 72 y.o. left handed female with onset of sxs 3 years ago, tingling and numbnes in face, nose, cheek bones, hands in their entirety, and feet to ankles. Has burning pain in mornings on bottom of feet, feels like she is walking on hot coals. Pain improves as she is up and moving, doesn't resolve completely, no pain at night. 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 She was found to have B12 deficiency and MGUS. Exam-tenderness to palpation on the dorsal surface of her feet, decreased PP in feet compared to hands, diminished lower extremity reflexes. We discussed the diagnosis of peripheral neuropathy, treatment of underlying cause to prevent progression, and the use of neuropathic pain medicines such as amitriptyline and gabapentin, to control the burning pain. The pain she describes in the morning that improves after walking is somewhat suggestive of plantar fasciitis rather than peripheral neuropathy. Recommend physical therapy for plantar fasciitis to see if she appreciates relief of her symptoms. Continue amitriptyline and gabapentin for now. Follow-up in clinic in 3 months, instructed to call in the interim with any questions or concerns. ICD-10-CM ICD-9-CM    1. Polyneuropathy associated with underlying disease ([facility] [address] G63 357.4    2. MGUS (monoclonal gammopathy of unknown significance) D47.2 273.1    3. B12 deficiency E53.8 266.2    4. Plantar fasciitis M72.2 728.71 REFERRAL TO PHYSICAL THERAPY       Signed:   Dennis Chavez MD  4/10/2019

## 2019-04-10 NOTE — LETTER
4/28/19 Patient: Jasmyne Maradiaga YOB: 1945 Date of Visit: 4/10/2019 Valentina Maciel MD 
[facility] [address] [facility] [address] [id number] VIA Facsimile: [phone] Dear Valentina Maciel MD, Thank you for referring Ms. Paige Jerome to [address] [place] [address] for evaluation. My notes for this consultation are attached. If you have questions, please do not hesitate to call me. I look forward to following your patient along with you. Sincerely, Andrea Landa MD

## 2019-04-29 ENCOUNTER — DOCUMENTATION ONLY (OUTPATIENT)
Dept: NEUROLOGY | Age: 74
End: 2019-04-29

## 2019-06-25 ENCOUNTER — ANESTHESIA (OUTPATIENT)
Dept: ENDOSCOPY | Age: 74
End: 2019-06-25
Payer: MEDICARE

## 2019-06-25 ENCOUNTER — HOSPITAL ENCOUNTER (OUTPATIENT)
Age: 74
Setting detail: OUTPATIENT SURGERY
Discharge: HOME | End: 2019-06-25
Attending: INTERNAL MEDICINE | Admitting: INTERNAL MEDICINE
Payer: MEDICARE

## 2019-06-25 ENCOUNTER — ANESTHESIA EVENT (OUTPATIENT)
Dept: ENDOSCOPY | Age: 74
End: 2019-06-25
Payer: MEDICARE

## 2019-06-25 VITALS
DIASTOLIC BLOOD PRESSURE: 65 MMHG | TEMPERATURE: 96.8 F | SYSTOLIC BLOOD PRESSURE: 128 MMHG | RESPIRATION RATE: 15 BRPM | WEIGHT: 185 LBS | HEART RATE: 74 BPM | BODY MASS INDEX: 30.32 KG/M2 | OXYGEN SATURATION: 100 %

## 2019-06-25 PROCEDURE — 74011250636 HC RX REV CODE- 250/636

## 2019-06-25 PROCEDURE — 74011250637 HC RX REV CODE- 250/637: Performed by: INTERNAL MEDICINE

## 2019-06-25 PROCEDURE — 76060000031 HC ANESTHESIA FIRST 0.5 HR: Performed by: INTERNAL MEDICINE

## 2019-06-25 PROCEDURE — 76040000019: Performed by: INTERNAL MEDICINE

## 2019-06-25 PROCEDURE — 77030027957 HC TBNG IRR ENDOGTR BUSS -B: Performed by: INTERNAL MEDICINE

## 2019-06-25 RX ORDER — SODIUM CHLORIDE 0.9 % (FLUSH) 0.9 %
5-40 SYRINGE (ML) INJECTION AS NEEDED
Status: DISCONTINUED | OUTPATIENT
Start: 2019-06-25 | End: 2019-06-25 | Stop reason: HOSPADM

## 2019-06-25 RX ORDER — ATROPINE SULFATE 0.1 MG/ML
0.5 INJECTION INTRAVENOUS
Status: DISCONTINUED | OUTPATIENT
Start: 2019-06-25 | End: 2019-06-25 | Stop reason: HOSPADM

## 2019-06-25 RX ORDER — PROPOFOL 10 MG/ML
INJECTION, EMULSION INTRAVENOUS AS NEEDED
Status: DISCONTINUED | OUTPATIENT
Start: 2019-06-25 | End: 2019-06-25 | Stop reason: HOSPADM

## 2019-06-25 RX ORDER — FENTANYL CITRATE 50 UG/ML
25-200 INJECTION, SOLUTION INTRAMUSCULAR; INTRAVENOUS
Status: DISCONTINUED | OUTPATIENT
Start: 2019-06-25 | End: 2019-06-25 | Stop reason: HOSPADM

## 2019-06-25 RX ORDER — MIDAZOLAM HYDROCHLORIDE 1 MG/ML
.25-5 INJECTION, SOLUTION INTRAMUSCULAR; INTRAVENOUS
Status: DISCONTINUED | OUTPATIENT
Start: 2019-06-25 | End: 2019-06-25 | Stop reason: HOSPADM

## 2019-06-25 RX ORDER — NALOXONE HYDROCHLORIDE 0.4 MG/ML
0.4 INJECTION, SOLUTION INTRAMUSCULAR; INTRAVENOUS; SUBCUTANEOUS
Status: DISCONTINUED | OUTPATIENT
Start: 2019-06-25 | End: 2019-06-25 | Stop reason: HOSPADM

## 2019-06-25 RX ORDER — DEXTROMETHORPHAN/PSEUDOEPHED 2.5-7.5/.8
1.2 DROPS ORAL
Status: DISCONTINUED | OUTPATIENT
Start: 2019-06-25 | End: 2019-06-25 | Stop reason: HOSPADM

## 2019-06-25 RX ORDER — SODIUM CHLORIDE 9 MG/ML
50 INJECTION, SOLUTION INTRAVENOUS CONTINUOUS
Status: DISCONTINUED | OUTPATIENT
Start: 2019-06-25 | End: 2019-06-25 | Stop reason: HOSPADM

## 2019-06-25 RX ORDER — SODIUM CHLORIDE 0.9 % (FLUSH) 0.9 %
5-40 SYRINGE (ML) INJECTION EVERY 8 HOURS
Status: DISCONTINUED | OUTPATIENT
Start: 2019-06-25 | End: 2019-06-25 | Stop reason: HOSPADM

## 2019-06-25 RX ORDER — FLUMAZENIL 0.1 MG/ML
0.2 INJECTION INTRAVENOUS
Status: DISCONTINUED | OUTPATIENT
Start: 2019-06-25 | End: 2019-06-25 | Stop reason: HOSPADM

## 2019-06-25 RX ORDER — EPINEPHRINE 0.1 MG/ML
1 INJECTION INTRACARDIAC; INTRAVENOUS
Status: DISCONTINUED | OUTPATIENT
Start: 2019-06-25 | End: 2019-06-25 | Stop reason: HOSPADM

## 2019-06-25 RX ORDER — LIDOCAINE HYDROCHLORIDE 20 MG/ML
INJECTION, SOLUTION EPIDURAL; INFILTRATION; INTRACAUDAL; PERINEURAL AS NEEDED
Status: DISCONTINUED | OUTPATIENT
Start: 2019-06-25 | End: 2019-06-25 | Stop reason: HOSPADM

## 2019-06-25 RX ADMIN — PROPOFOL 25 MG: 10 INJECTION, EMULSION INTRAVENOUS at 10:15

## 2019-06-25 RX ADMIN — PROPOFOL 25 MG: 10 INJECTION, EMULSION INTRAVENOUS at 10:21

## 2019-06-25 RX ADMIN — PROPOFOL 50 MG: 10 INJECTION, EMULSION INTRAVENOUS at 10:08

## 2019-06-25 RX ADMIN — PROPOFOL 25 MG: 10 INJECTION, EMULSION INTRAVENOUS at 10:18

## 2019-06-25 RX ADMIN — LIDOCAINE HYDROCHLORIDE 40 MG: 20 INJECTION, SOLUTION EPIDURAL; INFILTRATION; INTRACAUDAL; PERINEURAL at 10:08

## 2019-06-25 RX ADMIN — PROPOFOL 25 MG: 10 INJECTION, EMULSION INTRAVENOUS at 10:12

## 2019-06-25 RX ADMIN — PROPOFOL 50 MG: 10 INJECTION, EMULSION INTRAVENOUS at 10:09

## 2019-06-25 RX ADMIN — PROPOFOL 25 MG: 10 INJECTION, EMULSION INTRAVENOUS at 10:10

## 2019-06-25 NOTE — ANESTHESIA POSTPROCEDURE EVALUATION
Post-Anesthesia Evaluation and Assessment    Patient: Nancy Darby MRN: [id number]  SSN: [id number]    YOB: 1945  Age: 72 y.o. Sex: female      I have evaluated the patient and they are stable and ready for discharge from the PACU. Cardiovascular Function/Vital Signs  Visit Vitals  /64   Pulse 87   Temp 36 °C (96.8 °F)   Resp 17   Wt 83.9 kg (185 lb)   SpO2 95%   BMI 30.32 kg/m²       Patient is status post MAC anesthesia for Procedure(s):  COLONOSCOPY. Nausea/Vomiting: None    Postoperative hydration reviewed and adequate. Pain:  Pain Scale 1: Visual (06/25/19 1029)  Pain Intensity 1: 0 (06/25/19 1029)   Managed    Neurological Status: At baseline    Mental Status, Level of Consciousness: Alert and  oriented to person, place, and time    Pulmonary Status:   O2 Device: Room air (06/25/19 1031)   Adequate oxygenation and airway patent    Complications related to anesthesia: None    Post-anesthesia assessment completed. No concerns    Signed By: Anna Daniels MD     June 25, 2019              Procedure(s):  COLONOSCOPY. MAC    <BSHSIANPOST>    Vitals Value Taken Time   /74 6/25/2019 10:31 AM   Temp     Pulse 85 6/25/2019 10:31 AM   Resp 17 6/25/2019 10:31 AM   SpO2 96 % 6/25/2019 10:31 AM   Vitals shown include unvalidated device data.

## 2019-06-25 NOTE — ANESTHESIA PREPROCEDURE EVALUATION
Relevant Problems   No relevant active problems       Anesthetic History   No history of anesthetic complications            Review of Systems / Medical History  Patient summary reviewed, nursing notes reviewed and pertinent labs reviewed    Pulmonary  Within defined limits                 Neuro/Psych         Psychiatric history     Cardiovascular                  Exercise tolerance: >4 METS     GI/Hepatic/Renal     GERD           Endo/Other        Arthritis     Other Findings            Physical Exam    Airway  Mallampati: I  TM Distance: > 6 cm  Neck ROM: normal range of motion   Mouth opening: Normal     Cardiovascular    Rhythm: regular  Rate: normal         Dental  No notable dental hx       Pulmonary  Breath sounds clear to auscultation               Abdominal         Other Findings            Anesthetic Plan    ASA: 2  Anesthesia type: MAC          Induction: Intravenous  Anesthetic plan and risks discussed with: Patient

## 2019-06-25 NOTE — PERIOP NOTES
Patient has been evaluated by anesthesia pre-procedure. Patient alert and oriented. Vital signs will not be charted by the Endoscopy nurse. All vitals, airway, and loc are monitored by anesthesia staff throughout procedure.        .Endoscope was pre-cleaned at bedside immediately following procedure by Jeb Nuno

## 2019-06-25 NOTE — PROCEDURES
[address]  [address]      Colonoscopy Operative Report    Payal Bassett  [id number]  1945      Procedure Type:   Colonoscopy --diagnostic     Indications:    Family history of coloretal cancer (screening only), Personal history of colon polyps (screening only)   Date of last colonoscopy: 5 years ago, Polyps  Yes    Pre-operative Diagnosis: see indication above    Post-operative Diagnosis:  See findings below    :  Mahnaz Rabago MD    Surgical Assistant: None    Implants:  None    Referring Provider: Zoey Gan MD      Sedation:  MAC anesthesia Propofol      Procedure Details:  After informed consent was obtained with all risks and benefits of procedure explained and preoperative exam completed, the patient was taken to the endoscopy suite and placed in the left lateral decubitus position. Upon sequential sedation as per above, a digital rectal exam was performed demonstrating internal hemorrhoids. The Olympus videocolonoscope  was inserted in the rectum and carefully advanced to the cecum, which was identified by the ileocecal valve and appendiceal orifice. The cecum was identified by the ileocecal valve and appendiceal orifice. The quality of preparation was excellent. The colonoscope was slowly withdrawn with careful evaluation between folds. Retroflexion in the rectum was completed . Findings:   Rectum: normal  Sigmoid: mild diverticulosis  Descending Colon: normal  Transverse Colon: normal  Ascending Colon: normal  Cecum: normal        Specimen Removed:  none    Complications: None. EBL:  None. Impression:    see findings    Recommendations: --Repeat colonoscopy in 5 years.     High fiber diet    Signed By: Mahnaz Rabago MD     6/25/2019  10:25 AM

## 2019-06-25 NOTE — ROUTINE PROCESS
Ajith Parcel 1945 
[id number] Situation: 
Verbal report received from: Nat 
Procedure: Procedure(s): 
COLONOSCOPY Background: 
 
Preoperative diagnosis: FAMILY HISTORY OF COLON CANCER Postoperative diagnosis: Diverticulosis :  Dr. Nico Richards Assistant(s): Endoscopy Technician-1: José Miguel Camp IV 
Endoscopy RN-1: Daniel Xiong RN Specimens: * No specimens in log * H. Pylori  no Assessment: 
Intra-procedure medications Anesthesia gave intra-procedure sedation and medications, see anesthesia flow sheet yes Intravenous fluids: NS@ [facility] Vital signs stable Abdominal assessment: round and soft Recommendation: 
Discharge patient per MD order. Family or Friend Permission to share finding with family or friend yes

## 2019-06-25 NOTE — H&P
[address]  [address] [facility], [address] [place] [address]      History and Physical       NAME:  Johann South   :   1945   MRN:   [id number]             History of Present Illness:  Patient is a 72 y.o. who is seen for family h/o colon cancer. PMH:  Past Medical History:   Diagnosis Date    Arthritis     B12 deficiency     DDD (degenerative disc disease), cervical 2016    GERD (gastroesophageal reflux disease)     History of screening mammography 5/3/2012    MGUS (monoclonal gammopathy of unknown significance)     Neuropathy 11/3/2011    S/P arthroscopy of shoulder 11/3/2011    S/P cholecystectomy 11/3/2011    S/P hemorrhoidectomy 11/3/2011    S/P LUCA-BSO 11/3/2011    TMJ pain dysfunction syndrome     Unspecified adverse effect of anesthesia     loss of appetite after shoulder surgery       PSH:  Past Surgical History:   Procedure Laterality Date    ENDOSCOPY, COLON, DIAGNOSTIC      normal ([facility])    HX SHOULDER REPLACEMENT Bilateral        Allergies: Allergies   Allergen Reactions    Codeine Itching     Synthetic and GI. Itching under skin. Home Medications:  Prior to Admission Medications   Prescriptions Last Dose Informant Patient Reported? Taking? CARBOXYMETHYLCELLULOSE SODIUM (REFRESH OP) 2019 at Unknown time  Yes Yes   Sig: Administer 2 drops to both eyes as needed. Cholecalciferol, Vitamin D3, (VITAMIN D3) 2,000 unit cap capsule 2019 at Unknown time  Yes Yes   Sig: Take 2,000 Units by mouth daily. amitriptyline (ELAVIL) 25 mg tablet 2019 at Unknown time  Yes Yes   atorvastatin (LIPITOR) 20 mg tablet 2019 at Unknown time  Yes Yes   cyanocobalamin (VITAMIN B12) 1,000 mcg/mL injection 2019  Yes No   diclofenac EC (VOLTAREN) 75 mg EC tablet 2019 at Unknown time  Yes Yes   gabapentin (NEURONTIN) 100 mg capsule Not Taking at Unknown time  Yes No   Sig: three (3) times daily.    lisinopril (PRINIVIL, ZESTRIL) 5 mg tablet 6/25/2019 at Unknown time  Yes Yes   omeprazole (PRILOSEC) 40 mg capsule 6/25/2019 at Unknown time  Yes Yes   Sig: Take 40 mg by mouth daily. valACYclovir (VALTREX) 1 gram tablet 5/25/2019 at Unknown time  No Yes   Sig: Take 0.5 Tabs by mouth two (2) times a day. Facility-Administered Medications: None       Hospital Medications:  No current facility-administered medications for this encounter. Social History:  Social History     Tobacco Use    Smoking status: Current Every Day Smoker     Packs/day: 0.25     Types: Cigarettes    Smokeless tobacco: Never Used   Substance Use Topics    Alcohol use: No       Family History:  Family History   Problem Relation Age of Onset    Heart Disease Mother     Cancer Mother         liver    Hypertension Father     Stroke Father     Cancer Sister         colon    Cancer Brother         pancreatic    Breast Cancer Daughter 39    Breast Cancer Niece 61    Other Son         Dec drug OD             Review of Systems:      Constitutional: negative fever, negative chills, negative weight loss  Eyes:   negative visual changes  ENT:   negative sore throat, tongue or lip swelling  Respiratory:  negative cough, negative dyspnea  Cards:  negative for chest pain, palpitations, lower extremity edema  GI:   See HPI  :  negative for frequency, dysuria  Integument:  negative for rash and pruritus  Heme:  negative for easy bruising and gum/nose bleeding  Musculoskel: negative for myalgias,  back pain and muscle weakness  Neuro: negative for headaches, dizziness, vertigo  Psych:  negative for feelings of anxiety, depression       Objective:   No data found. No intake/output data recorded. No intake/output data recorded.     EXAM:     NEURO-a&o   HEENT-wnl   LUNGS-clear    COR-regular rate and rhythym     ABD-soft , no tenderness, no rebound, good bs     EXT-no edema     Data Review     No results for input(s): WBC, HGB, HCT, PLT, HGBEXT, HCTEXT, PLTEXT in the last 72 hours. No results for input(s): NA, K, CL, CO2, BUN, CREA, GLU, PHOS, CA in the last 72 hours. No results for input(s): SGOT, GPT, AP, TBIL, TP, ALB, GLOB, GGT, AML, LPSE in the last 72 hours. No lab exists for component: AMYP, HLPSE  No results for input(s): INR, PTP, APTT in the last 72 hours.     No lab exists for component: INREXT       Assessment:   · Family h/o colon cancer     Patient Active Problem List   Diagnosis Code    Hiatal hernia K44.9    History of seasonal allergies Z88.9    Depression F32.9    Anxiety F41.9    Osteoarthritis M19.90    Genital herpes A60.00    S/P arthroscopy of shoulder Z98.890    S/P cholecystectomy Z90.49    S/P LUCA-BSO Z90.710, Z90.722, Z90.79    S/P hemorrhoidectomy Z98.890, Z87.19    Neuropathy G62.9    Screen for colon cancer Z12.11    History of screening mammography Z92.89    S/P endoscopy Z98.890    Hyperlipidemia E78.5    Vitamin D deficiency E55.9    H/O bone density study Z92.89    DDD (degenerative disc disease), cervical M50.30    Advance directive discussed with patient Z71.89         Plan:   · Endoscopic procedure with sedation     Signed By: Kesha Rhodes MD     6/25/2019  9:52 AM

## 2019-06-25 NOTE — DISCHARGE INSTRUCTIONS
[address] [facility]    COLON DISCHARGE INSTRUCTIONS    Payal Bassett  [id number]  1945    Discomfort:  Redness at IV site- apply warm compress to area; if redness or soreness persist- contact your physician  There may be a slight amount of blood passed from the rectum  Gaseous discomfort- walking, belching will help relieve any discomfort  You may not operate a vehicle for 12 hours  You may not engage in an occupation involving machinery or appliances for rest of today  You may not drink alcoholic beverages for at least 12 hours  Avoid making any critical decisions for at least 24 hour  DIET:  You may resume your regular diet - however -  remember your colon is empty and a heavy meal will produce gas. Avoid these foods:  vegetables, fried / greasy foods, carbonated drinks     ACTIVITY:  You may  resume your normal daily activities it is recommended that you spend the remainder of the day resting -  avoid any strenuous activity. CALL M.D. ANY SIGN OF:   Increasing pain, nausea, vomiting  Abdominal distension (swelling)  New increased bleeding (oral or rectal)  Fever (chills)  Pain in chest area  Bloody discharge from nose or mouth  Shortness of breath      Follow-up Instructions:   Call Dr. Mahnaz Rabago for any questions or problems at [phone]   High fiber diet          ENDOSCOPY FINDINGS:   Your colonoscopy showed mild diverticulosis, no polyps seen.   Telephone # [phone]      Signed By: Mahnaz Rabago MD     6/25/2019  10:27 AM       DISCHARGE SUMMARY from Nurse    The following personal items collected during your admission are returned to you:   Dental Appliance:    Vision: Visual Aid: Glasses  Hearing Aid:    Jewelry:    Clothing:    Other Valuables:    Valuables sent to safe:

## 2019-07-25 ENCOUNTER — OFFICE VISIT (OUTPATIENT)
Dept: NEUROLOGY | Age: 74
End: 2019-07-25

## 2019-07-25 VITALS
WEIGHT: 181 LBS | BODY MASS INDEX: 29.09 KG/M2 | HEART RATE: 90 BPM | RESPIRATION RATE: 18 BRPM | SYSTOLIC BLOOD PRESSURE: 138 MMHG | HEIGHT: 66 IN | DIASTOLIC BLOOD PRESSURE: 80 MMHG | OXYGEN SATURATION: 98 %

## 2019-07-25 DIAGNOSIS — G63 POLYNEUROPATHY ASSOCIATED WITH UNDERLYING DISEASE (HCC): Primary | ICD-10-CM

## 2019-07-25 DIAGNOSIS — M48.062 SPINAL STENOSIS OF LUMBAR REGION WITH NEUROGENIC CLAUDICATION: ICD-10-CM

## 2019-07-25 DIAGNOSIS — M72.2 PLANTAR FASCIITIS: ICD-10-CM

## 2019-07-25 RX ORDER — AMITRIPTYLINE HYDROCHLORIDE 50 MG/1
50 TABLET, FILM COATED ORAL
Qty: 90 TAB | Refills: 3 | Status: SHIPPED | OUTPATIENT
Start: 2019-07-25 | End: 2020-02-25 | Stop reason: SDUPTHER

## 2019-07-25 NOTE — PATIENT INSTRUCTIONS
[address] [facility]   Statement to Patients  April 1, 2014      In an effort to ensure the large volume of patient prescription refills is processed in the most efficient and expeditious manner, we are asking our patients to assist us by calling your Pharmacy for all prescription refills, this will include also your  Mail Order Pharmacy. The pharmacy will contact our office electronically to continue the refill process. Please do not wait until the last minute to call your pharmacy. We need at least 48 hours (2days) to fill prescriptions. We also encourage you to call your pharmacy before going to  your prescription to make sure it is ready. With regard to controlled substance prescription refill requests (narcotic refills) that need to be picked up at our office, we ask your cooperation by providing us with at least 72 hours (3days) notice that you will need a refill. We will not refill narcotic prescription refill requests after 4:00pm on any weekday, Monday through Thursday, or after 2:00pm on Fridays, or on the weekends. We encourage everyone to explore another way of getting your prescription refill request processed using [employer], our patient web portal through our electronic medical record system. [employer] is an efficient and effective way to communicate your medication request directly to the office and  downloadable as an lazara on your smart phone . [employer] also features a review functionality that allows you to view your medication list as well as leave messages for your physician. Are you ready to get connected? If so please review the attatched instructions or speak to any of our staff to get you set up right away! Thank you so much for your cooperation. Should you have any questions please contact our Practice Administrator.     The Physicians and Staff,  [facility]

## 2019-07-25 NOTE — PROGRESS NOTES
Neurology Consult Note      HISTORY PROVIDED BY: patient    Chief Complaint:   Chief Complaint   Patient presents with    Neurologic Problem      Subjective:   Pt is a 72 y.o. left handed female initially and last seen on 4/10/19 with onset of sxs 3 years ago, tingling and numbnes in face, nose, cheek bones, hands in their entirety, and feet to ankles. Has burning pain in mornings on bottom of feet, feels like she is walking on hot coals. Pain improves as she is up and moving, doesn't resolve completely, no pain at night. 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 She was found to have B12 deficiency and MGUS, followed by Dr. Bryson Aguayo in Hem/Onc. Exam-tenderness to palpation on the dorsal surface of her feet, decreased PP in feet compared to hands, diminished lower extremity reflexes. We discussed the diagnosis of peripheral neuropathy, treatment of underlying cause to prevent progression, and the use of neuropathic pain medicines such as amitriptyline and gabapentin, to control the burning pain. The pain she describes in the morning that improves after walking somewhat suggestive of plantar fasciitis rather than peripheral neuropathy. Recommended physical therapy for plantar fasciitis to see if she appreciates relief of her symptoms. Continued amitriptyline 50mg qhs and gabapentin 100mg tid, just recently started, but had swelling with this in the past.     She returns for f/u. Pt notes pain is still present in mornings and improves as she moves around, swelling also goes down. She did not go to PT, never got a call from anyone. She has stopped gabapentin due to swelling as she had in the past. Taking amitriptyline 50mg qhs. Still has tingling in feet. Her back is still bothering her a good deal, has severe lumbar stenosis seen on MRI in Jan, 2018. Dr. Noah López recommended surgery, but he declined. Went to PT, but has not kept up with exercise. Past Medical History:   Diagnosis Date    Arthritis     B12 deficiency     DDD (degenerative disc disease), cervical 11/2016    GERD (gastroesophageal reflux disease)     History of screening mammography 5/3/2012    Lumbar stenosis     MGUS (monoclonal gammopathy of unknown significance)     Neuropathy 11/3/2011    S/P arthroscopy of shoulder 11/3/2011    S/P cholecystectomy 11/3/2011    S/P hemorrhoidectomy 11/3/2011    S/P LUCA-BSO 11/3/2011    TMJ pain dysfunction syndrome     Unspecified adverse effect of anesthesia     loss of appetite after shoulder surgery      Past Surgical History:   Procedure Laterality Date    COLONOSCOPY N/A 6/25/2019    COLONOSCOPY performed by Tootie Ruiz MD at [facility] ENDOSCOPY    ENDOSCOPY, COLON, DIAGNOSTIC  2001    normal ([place])    HX SHOULDER REPLACEMENT Bilateral       Social History     Socioeconomic History    Marital status:      Spouse name: Not on file    Number of children: Not on file    Years of education: Not on file    Highest education level: Not on file   Occupational History    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   Social Needs    Financial resource strain: Not on file    Food insecurity:     Worry: Not on file     Inability: Not on file    Transportation needs:     Medical: Not on file     Non-medical: Not on file   Tobacco Use    Smoking status: Current Every Day Smoker     Packs/day: 0.25     Types: Cigarettes    Smokeless tobacco: Never Used   Substance and Sexual Activity    Alcohol use: No    Drug use: No    Sexual activity: Never   Lifestyle    Physical activity:     Days per week: Not on file     Minutes per session: Not on file    Stress: Not on file   Relationships    Social connections:     Talks on phone: Not on file     Gets together: Not on file     Attends [employer] service: Not on file     Active member of club or organization: Not on file     Attends meetings of clubs or organizations: Not on file Relationship status: Not on file    Intimate partner violence:     Fear of current or ex partner: Not on file     Emotionally abused: Not on file     Physically abused: Not on file     Forced sexual activity: Not on file   Other Topics Concern    Not on file   Social History Narrative    Lives in [place] alone, RICH goes to [address] [place] [address] and visits on weekends     Family History   Problem Relation Age of Onset    Heart Disease Mother     Cancer Mother         liver    Hypertension Father     Stroke Father     Cancer Sister         colon    Cancer Brother         pancreatic    Breast Cancer Daughter 39    Breast Cancer Niece 61    Other Son         Dec drug OD         Objective:   ROS: Per HPI, or PMH, o/w reviewed and neg    Allergies   Allergen Reactions    Codeine Itching     Synthetic and GI. Itching under skin. Meds:  Outpatient Medications Prior to Visit   Medication Sig Dispense Refill    atorvastatin (LIPITOR) 20 mg tablet       cyanocobalamin (VITAMIN B12) 1,000 mcg/mL injection       diclofenac EC (VOLTAREN) 75 mg EC tablet       lisinopril (PRINIVIL, ZESTRIL) 5 mg tablet       valACYclovir (VALTREX) 1 gram tablet Take 0.5 Tabs by mouth two (2) times a day. 90 Tab 0    omeprazole (PRILOSEC) 40 mg capsule Take 40 mg by mouth daily.  Cholecalciferol, Vitamin D3, (VITAMIN D3) 2,000 unit cap capsule Take 2,000 Units by mouth daily.  CARBOXYMETHYLCELLULOSE SODIUM (REFRESH OP) Administer 2 drops to both eyes as needed.  amitriptyline (ELAVIL) 25 mg tablet       gabapentin (NEURONTIN) 100 mg capsule three (3) times daily. No facility-administered medications prior to visit. Imaging:  MRI Results (most recent):  Results from Hospital Encounter encounter on 03/26/18   MRI BRAIN WO CONT    Narrative EXAM:  MRI BRAIN WO CONT  INDICATION:  Other urinary incontinence, M 39.498, R 41.3, balance problem, R  26.89, pain in back of head.   TECHNIQUE: Sagittal T1, axial FLAIR, T2,T1 and gradient echo images as well as  coronal T2 weighted images and axial diffusion weighted images of the head were  obtained. COMPARISON:  CT head 10/11/10  FINDINGS:  The ventricular size and configuration are normal.   No areas of abnormal signal in the cerebral hemispheres, brainstem or  cerebellum. No evidence of hemorrhage, mass, infarct or abnormal extra-axial fluid  collections. Flow voids are present in the vertebral basilar and carotid artery systems. The craniocervical junction is normal.   The structures at the cranial base including paranasal sinuses are unremarkable. Impression IMPRESSION: Normal MRI of the head. CT Results (most recent):     Results from [place] encounter on 10/11/10   CT HEAD WITHOUT CONTRAST    Narrative Final Report       ICD Codes / Adm. Diagnosis:    /   HEADACHE  Examination:  HEAD WO CON  - [id number] - Oct 11 2010 10:09AM  Accession No:  [id number]  Reason:  REASON: STROKE      REPORT:  Multiple axial images were obtained from the skull base to the vertex   without the use of intravenous contrast. Ventricles and sulci are normal for   patient's age. There is no midline shift or herniation. The examination is   negative for acute infarct, mass lesion, or hemorrhage. The visualized   portions of the petrous temporal bones, paranasal sinuses, and orbits are   unremarkable. IMPRESSION: No acute process. Interpreting/Reading Doctor: Nirmala Mccann ([id number])  Transcribed: n/a on 10/11/2010  Approved: Nirmala Mccann ([id number])  10/11/2010  Distribution:  Attending Doctor: Talib DIAZ   Alternate Doctor: Talib Delgado.  JOE            Reviewed records in [facility] tab today    Lab Review   Results for orders placed or performed in visit on 03/29/17   CBC WITH AUTOMATED DIFF   Result Value Ref Range    WBC 6.9 3.4 - 10.8 x10E3/uL    RBC 4.53 3.77 - 5.28 x10E6/uL    HGB 12.1 11.1 - 15.9 g/dL    HCT 37.2 34.0 - 46.6 %    MCV 82 79 - 97 fL    MCH 26.7 26.6 - 33.0 pg    MCHC 32.5 31.5 - 35.7 g/dL    RDW 15.9 (H) 12.3 - 15.4 %    PLATELET 439 (H) 460 - 379 x10E3/uL    NEUTROPHILS 44 %    Lymphocytes 48 %    MONOCYTES 6 %    EOSINOPHILS 2 %    BASOPHILS 0 %    ABS. NEUTROPHILS 3.0 1.4 - 7.0 x10E3/uL    Abs Lymphocytes 3.2 (H) 0.7 - 3.1 x10E3/uL    ABS. MONOCYTES 0.4 0.1 - 0.9 x10E3/uL    ABS. EOSINOPHILS 0.2 0.0 - 0.4 x10E3/uL    ABS. BASOPHILS 0.0 0.0 - 0.2 x10E3/uL    IMMATURE GRANULOCYTES 0 %    ABS. IMM. GRANS. 0.0 0.0 - 0.1 N65A1/II   METABOLIC PANEL, COMPREHENSIVE   Result Value Ref Range    Glucose 78 65 - 99 mg/dL    BUN 11 8 - 27 mg/dL    Creatinine 0.65 0.57 - 1.00 mg/dL    GFR est non-AA 91 >59 mL/min/1.73    GFR est  >59 mL/min/1.73    BUN/Creatinine ratio 17 11 - 26    Sodium 144 134 - 144 mmol/L    Potassium 4.3 3.5 - 5.2 mmol/L    Chloride 102 96 - 106 mmol/L    CO2 28 18 - 29 mmol/L    Calcium 8.9 8.7 - 10.3 mg/dL    Protein, total 6.9 6.0 - 8.5 g/dL    Albumin 4.2 3.6 - 4.8 g/dL    GLOBULIN, TOTAL 2.7 1.5 - 4.5 g/dL    A-G Ratio 1.6 1.2 - 2.2    Bilirubin, total 0.3 0.0 - 1.2 mg/dL    Alk. phosphatase 97 39 - 117 IU/L    AST (SGOT) 12 0 - 40 IU/L    ALT (SGPT) 9 0 - 32 IU/L   LIPID PANEL   Result Value Ref Range    Cholesterol, total 217 (H) 100 - 199 mg/dL    Triglyceride 130 0 - 149 mg/dL    HDL Cholesterol 53 >39 mg/dL    VLDL, calculated 26 5 - 40 mg/dL    LDL, calculated 138 (H) 0 - 99 mg/dL   HEPATITIS C AB   Result Value Ref Range    Hep C Virus Ab <0.1 0.0 - 0.9 s/co ratio   CVD REPORT   Result Value Ref Range    INTERPRETATION Note         Exam:  Visit Vitals  /80   Pulse 90   Resp 18   Ht 5' 5.5\" (1.664 m)   Wt 82.1 kg (181 lb)   SpO2 98%   BMI 29.66 kg/m²     General:  Alert, cooperative, no distress. Head:  Normocephalic, without obvious abnormality, atraumatic. Respiratory:  Heart:   Non labored breathing  Regular rate and rhythm, no murmurs   Neck:      Extremities: Warm, no cyanosis or edema.  Tender to palpation on dorsum of feet   Pulses: 2+ radial pulses. Neurologic:  MS: Alert and oriented x 4, speech intact. Language intact. Attention and fund of knowledge appropriate. Recent and remote memory intact. Cranial Nerves:  II: visual fields    II: pupils    II: optic disc    III,VII: ptosis none   III,IV,VI: extraocular muscles  EOMI, no nystagmus or diplopia   V: facial light touch sensation     VII: facial muscle function   symmetric   VIII: hearing intact   IX: soft palate elevation     XI: trapezius strength     XI: sternocleidomastoid strength    XII: tongue       Motor: normal bulk and tone, no tremor, Strength: 5/5 throughout, no PD  Sensory: intact to LT, PP throughout, no Prox to distal gradient. Coordination: FTN and HTS intact, DORIS intact  Gait: normal gait  Reflexes: 2+ symmetric in UE, 1+ in LE, (At April visit: toes downgoing)       Assessment/Plan   Pt is a 72 y.o. left handed female presenting in April with c/o onset of sxs 3 years ago, tingling and numbnes in face, nose, cheek bones, hands in their entirety, and feet to ankles. Has burning pain in mornings on bottom of feet, feels like she is walking on hot coals. Pain improves as she is up and moving, doesn't resolve completely, no pain at night. 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 She was found to have B12 deficiency and MGUS, followed by Dr. Mary Henriquez in Hem/Onc. Exam-at initial visit: tenderness to palpation on the dorsal surface of her feet, decreased PP in feet compared to hands, diminished lower extremity reflexes. Today, reports increased PP in feet, no prox to distal gradient, no weakness, reflexes stable. Continue amitriptyline 50mg qhs for PN. Suspect pain in feet in mornings is musculoskeletal, possible plantar fasciitis. Recommend PT. She has appt with Hem/Onc soon to reassess MGUS. Will also ask PT to eval and tx lumbar stenosis.  Pt is encouraged to f/u with Dr. Laura Peguero again. Follow-up in clinic in 6 months, instructed to call in the interim with any questions or concerns. ICD-10-CM ICD-9-CM    1. Polyneuropathy associated with underlying disease ([facility] G63 357.4 REFERRAL TO PHYSICAL THERAPY   2. Plantar fasciitis M72.2 728.71 REFERRAL TO PHYSICAL THERAPY   3. Spinal stenosis of lumbar region with neurogenic claudication M48.062 724.03 REFERRAL TO PHYSICAL THERAPY       Signed:   Talia Graves MD  7/25/2019

## 2019-07-25 NOTE — LETTER
7/25/19 Patient: Gideon Taylor YOB: 1945 Date of Visit: 7/25/2019 Miguel Canseco MD 
[facility] [address] [facility] [address] [address] VIA Facsimile: [phone] Dear Miguel Canseco MD, Thank you for referring Ms. Nena Gomez to [address] [place] [address] for evaluation. My notes for this consultation are attached. If you have questions, please do not hesitate to call me. I look forward to following your patient along with you. Sincerely, Nora Sheppard MD

## 2019-09-11 ENCOUNTER — HOSPITAL ENCOUNTER (OUTPATIENT)
Dept: ULTRASOUND IMAGING | Age: 74
Discharge: HOME | End: 2019-09-11
Attending: INTERNAL MEDICINE
Payer: MEDICARE

## 2019-09-11 DIAGNOSIS — F17.210 CIGARETTE SMOKER: ICD-10-CM

## 2019-09-11 DIAGNOSIS — I10 ESSENTIAL HYPERTENSION: ICD-10-CM

## 2019-09-11 DIAGNOSIS — Z13.6 SCREENING FOR AAA (AORTIC ABDOMINAL ANEURYSM): ICD-10-CM

## 2019-09-11 DIAGNOSIS — I73.9 CLAUDICATION, INTERMITTENT (HCC): ICD-10-CM

## 2019-09-11 LAB
LEFT ABI: 1.28
LEFT ANTERIOR TIBIAL: 163 MMHG
LEFT ARM BP: 127 MMHG
LEFT POSTERIOR TIBIAL: 137 MMHG
RIGHT ABI: 1.19
RIGHT ANTERIOR TIBIAL: 149 MMHG
RIGHT ARM BP: 114 MMHG
RIGHT POSTERIOR TIBIAL: 151 MMHG

## 2019-09-11 PROCEDURE — 76706 US ABDL AORTA SCREEN AAA: CPT

## 2019-09-11 PROCEDURE — 93922 UPR/L XTREMITY ART 2 LEVELS: CPT

## 2020-02-14 ENCOUNTER — TELEPHONE (OUTPATIENT)
Dept: NEUROLOGY | Age: 75
End: 2020-02-14

## 2020-02-14 NOTE — TELEPHONE ENCOUNTER
Pt calling back to let Dr Loida Stauffer know that Feb 25 in the morning she has another appt (r/s from 18th) and was wondering if she could do the same day but that afternoon. Please advise.

## 2020-02-14 NOTE — TELEPHONE ENCOUNTER
I advised patient that Dr. Dieudonne Virk' schedule is booked that day. She agreed to keep her current appointment time.

## 2020-02-18 ENCOUNTER — HOSPITAL ENCOUNTER (OUTPATIENT)
Dept: GENERAL RADIOLOGY | Age: 75
Discharge: HOME | End: 2020-02-18
Attending: INTERNAL MEDICINE
Payer: MEDICARE

## 2020-02-18 DIAGNOSIS — R06.02 SHORTNESS OF BREATH: ICD-10-CM

## 2020-02-18 DIAGNOSIS — R53.83 FATIGUE: ICD-10-CM

## 2020-02-18 PROCEDURE — 71046 X-RAY EXAM CHEST 2 VIEWS: CPT

## 2020-02-20 ENCOUNTER — HOSPITAL ENCOUNTER (OUTPATIENT)
Dept: MRI IMAGING | Age: 75
Discharge: HOME | End: 2020-02-20
Attending: INTERNAL MEDICINE
Payer: MEDICARE

## 2020-02-20 DIAGNOSIS — R29.810 WEAKNESS ON RIGHT SIDE OF FACE: ICD-10-CM

## 2020-02-20 DIAGNOSIS — R20.0 NUMBNESS OF FACE: ICD-10-CM

## 2020-02-20 PROCEDURE — 70551 MRI BRAIN STEM W/O DYE: CPT

## 2020-02-25 ENCOUNTER — OFFICE VISIT (OUTPATIENT)
Dept: NEUROLOGY | Age: 75
End: 2020-02-25

## 2020-02-25 VITALS
HEART RATE: 87 BPM | HEIGHT: 66 IN | TEMPERATURE: 97.6 F | SYSTOLIC BLOOD PRESSURE: 138 MMHG | WEIGHT: 188 LBS | OXYGEN SATURATION: 98 % | BODY MASS INDEX: 30.22 KG/M2 | DIASTOLIC BLOOD PRESSURE: 80 MMHG

## 2020-02-25 DIAGNOSIS — M72.2 PLANTAR FASCIITIS: ICD-10-CM

## 2020-02-25 DIAGNOSIS — G63 POLYNEUROPATHY ASSOCIATED WITH UNDERLYING DISEASE (HCC): Primary | ICD-10-CM

## 2020-02-25 DIAGNOSIS — M48.062 SPINAL STENOSIS OF LUMBAR REGION WITH NEUROGENIC CLAUDICATION: ICD-10-CM

## 2020-02-25 RX ORDER — AMITRIPTYLINE HYDROCHLORIDE 75 MG/1
75 TABLET, FILM COATED ORAL
Qty: 90 TAB | Refills: 3 | Status: SHIPPED | OUTPATIENT
Start: 2020-02-25 | End: 2020-06-11 | Stop reason: DRUGHIGH

## 2020-02-25 NOTE — PROGRESS NOTES
Chief Complaint   Patient presents with    Peripheral Neuropathy     Harry hand and feet Neuropathy noted no change from last visit. Patient states feet feel tight like they are swollen and the pain is greater than hands. Pt did note that she tried ot stop taking ht e amitriptyline but realized it was helping and resumed taking. She maybe wants to increase dose.     Follow-up     Visit Vitals  /80 (BP 1 Location: Right arm, BP Patient Position: Sitting)   Pulse 87   Temp 97.6 °F (36.4 °C) (Oral)   Ht 5' 5.5\" (1.664 m)   Wt 85.3 kg (188 lb)   SpO2 98%   BMI 30.81 kg/m²

## 2020-02-25 NOTE — LETTER
2/27/20 Patient: Gay January YOB: 1945 Date of Visit: 2/25/2020 Maryse Henry MD 
[facility] [address] [facility] [address] [address] VIA Facsimile: [phone] Dear Maryse Henry MD, Thank you for referring Ms. Sania Yuan to [address] [place] [address] for evaluation. My notes for this consultation are attached. If you have questions, please do not hesitate to call me. I look forward to following your patient along with you. Sincerely, Wolfgang Carson MD

## 2020-02-25 NOTE — PROGRESS NOTES
Neurology Consult Note      HISTORY PROVIDED BY: patient    Chief Complaint:   Chief Complaint   Patient presents with    Peripheral Neuropathy     Harry hand and feet Neuropathy noted no change from last visit. Patient states feet feel tight like they are swollen and the pain is greater than hands. Pt did note that she tried ot stop taking ht e amitriptyline but realized it was helping and resumed taking. She maybe wants to increase dose.  Follow-up      Subjective:   Pt is a 69 y.o. left handed female last seen in clinic on 7/25/19 in f/u for PN, presenting in April, 2019 with c/o onset of sxs 3 years ago, tingling and numbnes in face, nose, cheek bones, hands in their entirety, and feet to ankles. Has burning pain in mornings on bottom of feet, feels like she is walking on hot coals. Pain improves as she is up and moving, doesn't resolve completely, no pain at night. NCS/EMG through orthopedist office that reportedly revealed a sensory>motor PN. She was found to have B12 deficiency and MGUS, followed by Dr. Priyanka Stahl in Hem/Onc. Exam-at initial visit: tenderness to palpation on the dorsum of her feet, decreased PP in feet compared to hands, diminished lower extremity reflexes. At last visit, reported increased PP in feet, no prox to distal gradient, no weakness, reflexes stable. Continued amitriptyline 50mg qhs for PN. Suspected pain in feet in mornings is musculoskeletal, possible plantar fasciitis. Recommended PT. She has appt with Hem/Onc soon to reassess MGUS. Will also ask PT to eval and tx lumbar stenosis. Pt is encouraged to f/u with Dr. Osmani Conn again. She returns for f/u. Pt tried to cut back the amitriptyline b/c she thought it wasn't really helping, but quickly realized it was and went back to 50mg qhs. She has a f/u with her spine physician in two months. She may get a steroid injection for pain.  She went to PT and was given exercises to do, she didn't feel like they were helping so she stopped them. It doesn't sound like they focused on her foot pain. Since her last visit, she had HTN at her PCP's office and an EKG showed an irregular rhythm, she has been seen by a cardiologist and has monitor planned. She also had HA, right posterior neck to scalp, very sharp and severe, and felt like her right face was weak, her PCP did an MRI brain wo contrast on 2/20/20 and this was negative for acute stroke. Pt has h/o HAs, but not like this. Has not had recurrent pain. No notes provided. Notes from [employer] received and reviewed - OV 11/18/19 - Stable labs, M-spike 0.3. She was to f/u in 3 months, if stable, then f/u in six months.      Past Medical History:   Diagnosis Date    Arthritis     B12 deficiency     DDD (degenerative disc disease), cervical 11/2016    GERD (gastroesophageal reflux disease)     History of screening mammography 5/3/2012    Lumbar stenosis     MGUS (monoclonal gammopathy of unknown significance)     Neuropathy 11/3/2011    S/P arthroscopy of shoulder 11/3/2011    S/P cholecystectomy 11/3/2011    S/P hemorrhoidectomy 11/3/2011    S/P LUCA-BSO 11/3/2011    TMJ pain dysfunction syndrome     Unspecified adverse effect of anesthesia     loss of appetite after shoulder surgery      Past Surgical History:   Procedure Laterality Date    COLONOSCOPY N/A 6/25/2019    COLON[facility]COPY performed by Lynnette Cruz MD at [facility] ENDOSCOPY    ENDOSCOPY, COLON, DIAGNOSTIC  2001    normal ([place])    HX SHOULDER REPLACEMENT Bilateral       Social History     Socioeconomic History    Marital status:      Spouse name: Not on file    Number of children: Not on file    Years of education: Not on file    Highest education level: Not on file   Occupational History    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   Social Needs    Financial resource strain: Not on file    Food insecurity:     Worry: Not on file     Inability: Not on file    Transportation needs: Medical: Not on file     Non-medical: Not on file   Tobacco Use    Smoking status: Current Every Day Smoker     Packs/day: 0.25     Types: Cigarettes    Smokeless tobacco: Never Used   Substance and Sexual Activity    Alcohol use: No    Drug use: No    Sexual activity: Never   Lifestyle    Physical activity:     Days per week: Not on file     Minutes per session: Not on file    Stress: Not on file   Relationships    Social connections:     Talks on phone: Not on file     Gets together: Not on file     Attends Buddhism service: Not on file     Active member of club or organization: Not on file     Attends meetings of clubs or organizations: Not on file     Relationship status: Not on file    Intimate partner violence:     Fear of current or ex partner: Not on file     Emotionally abused: Not on file     Physically abused: Not on file     Forced sexual activity: Not on file   Other Topics Concern    Not on file   Social History Narrative    Lives in [place] alone, GD goes to [facility] and visits on weekends     Family History   Problem Relation Age of Onset    Heart Disease Mother     Cancer Mother         liver    Hypertension Father     Stroke Father     Cancer Sister         colon    Cancer Brother         pancreatic    Breast Cancer Daughter 39    Breast Cancer Niece 61    Other Son         Dec drug OD         Objective:   ROS: Per HPI, or PMH, o/w reviewed and neg    Allergies   Allergen Reactions    Codeine Itching     Synthetic and GI. Itching under skin. Meds:  Outpatient Medications Prior to Visit   Medication Sig Dispense Refill    amitriptyline (ELAVIL) 50 mg tablet Take 1 Tab by mouth nightly. 90 Tab 3    atorvastatin (LIPITOR) 20 mg tablet       cyanocobalamin (VITAMIN B12) 1,000 mcg/mL injection       diclofenac EC (VOLTAREN) 75 mg EC tablet       lisinopril (PRINIVIL, ZESTRIL) 5 mg tablet       valACYclovir (VALTREX) 1 gram tablet Take 0.5 Tabs by mouth two (2) times a day. 90 Tab 0    omeprazole (PRILOSEC) 40 mg capsule Take 40 mg by mouth daily.  Cholecalciferol, Vitamin D3, (VITAMIN D3) 2,000 unit cap capsule Take 2,000 Units by mouth daily.  CARBOXYMETHYLCELLULOSE SODIUM (REFRESH OP) Administer 2 drops to both eyes as needed.  gabapentin (NEURONTIN) 100 mg capsule three (3) times daily. No facility-administered medications prior to visit. Imaging:  MRI Results (most recent):  Results from Hospital Encounter encounter on 02/20/20   MRI BRAIN WO CONT    Narrative INDICATION:   Weakness of right side of face, and numbness of face     EXAMINATION:  MRI BRAIN WO CONTRAST    COMPARISON:  March 26, 2018    TECHNIQUE:  Multiplanar multisequence acquisition without contrast of the brain. FINDINGS:      Ventricles:  Midline, no hydrocephalus. Brain Parenchyma/Brainstem:  Normal for age. No acute infarction. Intracranial Hemorrhage:  None. Basal Cisterns:  Patent. Small round T2 hypointense focus near the superior  aspect of the right internal auditory canal likely represents a bony excrescence  seen on prior CT imaging. Flow Voids:  Normal.  Additional Comments:  Slight prominence of the right choroidal fissure above the  hippocampus, with no definite interval change or underlying hippocampal signal  abnormality. Impression IMPRESSION:  No significant abnormality or acute process. Unchanged small T2 hypointense  focus likely osseous in nature near the superior aspect of the right internal  auditory canal. If further evaluation necessary consider dedicated IAC sequences  with and without contrast.        CT Results (most recent):     Results from Hospital Encounter encounter on 10/11/10   CT HEAD WITHOUT CONTRAST    Narrative Final Report       ICD Codes / Adm. Diagnosis:    /   HEADACHE  Examination:  HEAD WO CON  - [id number] - Oct 11 2010 10:09AM  Accession No:  [id number]  Reason:  REASON: STROKE      REPORT:  Multiple axial images were obtained from the skull base to the vertex   without the use of intravenous contrast. Ventricles and sulci are normal for   patient's age. There is no midline shift or herniation. The examination is   negative for acute infarct, mass lesion, or hemorrhage. The visualized   portions of the petrous temporal bones, paranasal sinuses, and orbits are   unremarkable. IMPRESSION: No acute process. Interpreting/Reading Doctor: Vincente Lennox ([id number])  Transcribed: n/a on 10/11/2010  Approved: Vincente Lennox ([id number])  10/11/2010  Distribution:  Attending Doctor: Yenny DIAZ   Alternate Doctor: Yenny Doan. JOE            Reviewed records in [facility] tab today    Lab Review   Results for orders placed or performed during the hospital encounter of 09/11/19   ANKLE BRACHIAL INDEX   Result Value Ref Range    Left arm  mmHg    Right arm  mmHg    Left posterior tibial 137 mmHg    Right posterior tibial 151 mmHg    Left anterior tibial 163 mmHg    Right anterior tibial 149 mmHg    Left MICHELLE 1.28     Right MICHELLE 1.19         Exam:  Visit Vitals  /80 (BP 1 Location: Right arm, BP Patient Position: Sitting) Comment (BP Patient Position): sit   Pulse 87   Temp 97.6 °F (36.4 °C) (Oral)   Ht 5' 5.5\" (1.664 m)   Wt 85.3 kg (188 lb)   SpO2 98%   BMI 30.81 kg/m²     General:  Alert, cooperative, no distress. Head:  Normocephalic, without obvious abnormality, atraumatic. Respiratory:  Heart:   Non labored breathing  Regular rate and rhythm, no murmurs   Neck:      Extremities: Warm, no cyanosis or edema. Tender to palpation on dorsum of feet   Pulses: 2+ radial pulses. Neurologic:  MS: Alert and oriented x 4, speech intact. Language intact. Attention and fund of knowledge appropriate. Recent and remote memory intact.   Cranial Nerves:  II: visual fields    II: pupils    II: optic disc    III,VII: ptosis none   III,IV,VI: extraocular muscles  EOMI, no nystagmus or diplopia   V: facial light touch sensation     VII: facial muscle function   symmetric   VIII: hearing intact   IX: soft palate elevation     XI: trapezius strength     XI: sternocleidomastoid strength    XII: tongue       Motor: normal bulk and tone, no tremor, Strength: 5/5 throughout, no PD  Sensory: intact to LT, PP throughout, no Prox to distal gradient. Coordination: FTN and HTS intact, DORIS intact  Gait: normal gait  Reflexes: 2+ symmetric in UE, 1+ in LE, toes downgoing       Assessment/Plan   Pt is a 69 y.o. left handed female with PN, presenting in April, 2019 with c/o onset of sxs 3 years ago. NCS/EMG through orthopedist office that reportedly revealed a sensory>motor PN. She was found to have B12 deficiency and MGUS, followed by Dr. My Brewer, now Dr. Arielle Escamilla in Hem/Onc. Has N/T in face, nose, cheek bones, hands in their entirety, and feet to ankles. Has burning pain in mornings on bottom of feet, feels like she is walking on hot coals. Pain improves as she is up and moving, doesn't resolve completely, no pain at night. Recent severe sharp pain right posterior neck to scalp, possible right facial weakness, all resolved, MRI brain wo contrast on 2/20/20 and this was negative for acute stroke. Exam-at initial visit: tenderness to palpation on the dorsum of her feet, decreased PP in feet compared to hands, diminished lower extremity reflexes. Today, intact PP no prox to distal gradient, no weakness, reflexes stable. Increase amitriptyline to 75mg qhs for PN. Suspected pain in feet in mornings is musculoskeletal, possible plantar fasciitis. Referred to PT again, pt will make certain they understand that this is for foot pain only. May have some contribution from lumbar stenosis, possibly neurogenic claudication, she is followed by spine surgeon. Headache is suggestive of occipital neuralgia, pt will call if returns. Follow-up in clinic in 6 months, instructed to call in the interim with any questions or concerns.     ICD-10-CM ICD-9-CM    1. Polyneuropathy associated with underlying disease ([facility] [address] G63 357.4    2. Plantar fasciitis M72.2 728.71    3. Spinal stenosis of lumbar region with neurogenic claudication M48.062 724.03        Signed:   Dioni Wallace MD  2/25/2020

## 2020-03-24 ENCOUNTER — TELEPHONE (OUTPATIENT)
Dept: NEUROLOGY | Age: 75
End: 2020-03-24

## 2020-03-24 NOTE — TELEPHONE ENCOUNTER
Pt calling about Alfonzo Martin increasing her amitriptyline to 75 mg and wants Dr Alfonzo Martin to know that it has helped and her feet are about 50 percent better. Just an FYI, no call back needed.

## 2020-06-10 ENCOUNTER — TELEPHONE (OUTPATIENT)
Dept: NEUROLOGY | Age: 75
End: 2020-06-10

## 2020-06-10 NOTE — TELEPHONE ENCOUNTER
Pt calling about Amitriptyline, pt stated on the 75 mg she feels she is in a constant fog and would like to go back to the 50 mg. Please call.

## 2020-06-10 NOTE — TELEPHONE ENCOUNTER
The patient states she is experiencing some light headiness once her dose of amitriptyline was increased to 75mg. She states if  she do not get at least 9 hours of sleep then it feels as if she has a hangover. This has been going on for about 2-3 months now and still no improvement.

## 2020-06-11 RX ORDER — AMITRIPTYLINE HYDROCHLORIDE 50 MG/1
50 TABLET, FILM COATED ORAL
Qty: 90 TAB | Refills: 3 | Status: SHIPPED | OUTPATIENT
Start: 2020-06-11 | End: 2022-03-01

## 2020-06-11 NOTE — TELEPHONE ENCOUNTER
Armand - Please call pt: Absolutely! I have sent in a new Rx for amitriptyline 50mg. I am sorry to hear she didn't tolerate the higher dose. We can discuss a different HA prevention med if her HAs become more frequent on the lower dose.

## 2020-08-25 ENCOUNTER — OFFICE VISIT (OUTPATIENT)
Dept: NEUROLOGY | Facility: CLINIC | Age: 75
End: 2020-08-25

## 2020-08-25 VITALS
DIASTOLIC BLOOD PRESSURE: 80 MMHG | WEIGHT: 185 LBS | OXYGEN SATURATION: 97 % | HEIGHT: 66 IN | SYSTOLIC BLOOD PRESSURE: 132 MMHG | BODY MASS INDEX: 29.73 KG/M2 | HEART RATE: 90 BPM

## 2020-08-25 NOTE — PROGRESS NOTES
Chief Complaint   Patient presents with    Peripheral Neuropathy     follow up \" I was in a fog after my dosage was increased so I came back down to 50mg at night, My hands and feet still tingle and now it feels ermias my face is starting to tingle. \"     Visit Vitals  /80 (BP 1 Location: Right arm, BP Patient Position: Sitting)   Pulse 90   Ht 5' 5.5\" (1.664 m)   Wt 83.9 kg (185 lb)   SpO2 97%   BMI 30.32 kg/m²

## 2020-08-28 ENCOUNTER — TELEPHONE (OUTPATIENT)
Dept: NEUROLOGY | Facility: CLINIC | Age: 75
End: 2020-08-28

## 2020-08-28 NOTE — TELEPHONE ENCOUNTER
Called pt. She is pleased with current HA control on amitriptyline 50mg qhs. She had one concern, her feet and ankles are icy when she gets in bed at night, but normal temperature in morning and other times of the day. This could be normal or may have some association with her PN, but it is not concerning and doesn't make me worry about a circulation issue. She mentions have night sweats and being emotional recently, easily becoming upset. Mentioned everything going on in the world right night being upsetting and she is trying not to watch too much news. I reassured her that she was not alone, many people are having trouble with anxiety and depression during the pandemic. I suggested she may need an antidepressant and should talk to her PCP. I also suggested she mention night sweats to Dr. Epi Lei to make certain there is not a concerning cause.    She has a f/u appt with me in Oct.

## 2020-09-17 ENCOUNTER — HOSPITAL ENCOUNTER (OUTPATIENT)
Dept: MAMMOGRAPHY | Age: 75
Discharge: HOME | End: 2020-09-17
Attending: INTERNAL MEDICINE
Payer: MEDICARE

## 2020-09-17 DIAGNOSIS — Z12.31 VISIT FOR SCREENING MAMMOGRAM: ICD-10-CM

## 2020-09-17 PROCEDURE — 77067 SCR MAMMO BI INCL CAD: CPT

## 2020-09-30 ENCOUNTER — TELEPHONE (OUTPATIENT)
Dept: NEUROLOGY | Facility: CLINIC | Age: 75
End: 2020-09-30

## 2021-05-10 ENCOUNTER — TRANSCRIBE ORDER (OUTPATIENT)
Dept: SCHEDULING | Age: 76
End: 2021-05-10

## 2021-05-10 DIAGNOSIS — M54.31 BILATERAL SCIATICA: Primary | ICD-10-CM

## 2021-05-10 DIAGNOSIS — R20.1 HYPOESTHESIA OF SKIN: ICD-10-CM

## 2021-05-10 DIAGNOSIS — M54.32 BILATERAL SCIATICA: Primary | ICD-10-CM

## 2021-05-10 DIAGNOSIS — M47.817 LUMBOSACRAL SPONDYLOSIS WITHOUT MYELOPATHY: ICD-10-CM

## 2021-05-23 ENCOUNTER — HOSPITAL ENCOUNTER (OUTPATIENT)
Dept: PREADMISSION TESTING | Age: 76
Discharge: HOME | End: 2021-05-23
Payer: MEDICARE

## 2021-05-23 ENCOUNTER — TRANSCRIBE ORDER (OUTPATIENT)
Dept: REGISTRATION | Age: 76
End: 2021-05-23

## 2021-05-23 DIAGNOSIS — Z01.812 PRE-PROCEDURE LAB EXAM: Primary | ICD-10-CM

## 2021-05-23 DIAGNOSIS — Z01.812 PRE-PROCEDURE LAB EXAM: ICD-10-CM

## 2021-05-23 PROCEDURE — U0003 INFECTIOUS AGENT DETECTION BY NUCLEIC ACID (DNA OR RNA); SEVERE ACUTE RESPIRATORY SYNDROME CORONAVIRUS 2 (SARS-COV-2) (CORONAVIRUS DISEASE [COVID-19]), AMPLIFIED PROBE TECHNIQUE, MAKING USE OF HIGH THROUGHPUT TECHNOLOGIES AS DESCRIBED BY CMS-2020-01-R: HCPCS

## 2021-05-24 LAB — SARS-COV-2, COV2NT: NOT DETECTED

## 2021-05-27 ENCOUNTER — HOSPITAL ENCOUNTER (OUTPATIENT)
Age: 76
Setting detail: OUTPATIENT SURGERY
Discharge: HOME | End: 2021-05-27
Attending: INTERNAL MEDICINE | Admitting: INTERNAL MEDICINE
Payer: MEDICARE

## 2021-05-27 ENCOUNTER — ANESTHESIA (OUTPATIENT)
Dept: ENDOSCOPY | Age: 76
End: 2021-05-27
Payer: MEDICARE

## 2021-05-27 ENCOUNTER — ANESTHESIA EVENT (OUTPATIENT)
Dept: ENDOSCOPY | Age: 76
End: 2021-05-27
Payer: MEDICARE

## 2021-05-27 VITALS
DIASTOLIC BLOOD PRESSURE: 72 MMHG | HEART RATE: 87 BPM | HEIGHT: 66 IN | BODY MASS INDEX: 28.93 KG/M2 | TEMPERATURE: 98 F | SYSTOLIC BLOOD PRESSURE: 132 MMHG | RESPIRATION RATE: 18 BRPM | OXYGEN SATURATION: 96 % | WEIGHT: 180 LBS

## 2021-05-27 PROCEDURE — 88305 TISSUE EXAM BY PATHOLOGIST: CPT

## 2021-05-27 PROCEDURE — 76040000019: Performed by: INTERNAL MEDICINE

## 2021-05-27 PROCEDURE — 2709999900 HC NON-CHARGEABLE SUPPLY: Performed by: INTERNAL MEDICINE

## 2021-05-27 PROCEDURE — 74011000250 HC RX REV CODE- 250: Performed by: NURSE ANESTHETIST, CERTIFIED REGISTERED

## 2021-05-27 PROCEDURE — 74011250636 HC RX REV CODE- 250/636: Performed by: NURSE ANESTHETIST, CERTIFIED REGISTERED

## 2021-05-27 PROCEDURE — 76060000031 HC ANESTHESIA FIRST 0.5 HR: Performed by: INTERNAL MEDICINE

## 2021-05-27 PROCEDURE — 77030021593 HC FCPS BIOP ENDOSC BSC -A: Performed by: INTERNAL MEDICINE

## 2021-05-27 RX ORDER — EPINEPHRINE 0.1 MG/ML
1 INJECTION INTRACARDIAC; INTRAVENOUS
Status: DISCONTINUED | OUTPATIENT
Start: 2021-05-27 | End: 2021-05-27 | Stop reason: HOSPADM

## 2021-05-27 RX ORDER — SODIUM CHLORIDE 0.9 % (FLUSH) 0.9 %
5-40 SYRINGE (ML) INJECTION EVERY 8 HOURS
Status: DISCONTINUED | OUTPATIENT
Start: 2021-05-27 | End: 2021-05-27 | Stop reason: HOSPADM

## 2021-05-27 RX ORDER — SODIUM CHLORIDE 9 MG/ML
INJECTION, SOLUTION INTRAVENOUS
Status: DISCONTINUED | OUTPATIENT
Start: 2021-05-27 | End: 2021-05-27 | Stop reason: HOSPADM

## 2021-05-27 RX ORDER — FAMOTIDINE 20 MG/1
20 TABLET, FILM COATED ORAL
COMMUNITY

## 2021-05-27 RX ORDER — ATROPINE SULFATE 0.1 MG/ML
0.5 INJECTION INTRAVENOUS
Status: DISCONTINUED | OUTPATIENT
Start: 2021-05-27 | End: 2021-05-27 | Stop reason: HOSPADM

## 2021-05-27 RX ORDER — FLUMAZENIL 0.1 MG/ML
0.2 INJECTION INTRAVENOUS
Status: DISCONTINUED | OUTPATIENT
Start: 2021-05-27 | End: 2021-05-27 | Stop reason: HOSPADM

## 2021-05-27 RX ORDER — DEXTROMETHORPHAN/PSEUDOEPHED 2.5-7.5/.8
1.2 DROPS ORAL
Status: DISCONTINUED | OUTPATIENT
Start: 2021-05-27 | End: 2021-05-27 | Stop reason: HOSPADM

## 2021-05-27 RX ORDER — MIDAZOLAM HYDROCHLORIDE 1 MG/ML
.25-5 INJECTION, SOLUTION INTRAMUSCULAR; INTRAVENOUS
Status: DISCONTINUED | OUTPATIENT
Start: 2021-05-27 | End: 2021-05-27 | Stop reason: HOSPADM

## 2021-05-27 RX ORDER — SODIUM CHLORIDE 0.9 % (FLUSH) 0.9 %
5-40 SYRINGE (ML) INJECTION AS NEEDED
Status: DISCONTINUED | OUTPATIENT
Start: 2021-05-27 | End: 2021-05-27 | Stop reason: HOSPADM

## 2021-05-27 RX ORDER — SODIUM CHLORIDE 9 MG/ML
50 INJECTION, SOLUTION INTRAVENOUS CONTINUOUS
Status: DISCONTINUED | OUTPATIENT
Start: 2021-05-27 | End: 2021-05-27 | Stop reason: HOSPADM

## 2021-05-27 RX ORDER — LIDOCAINE HYDROCHLORIDE 20 MG/ML
INJECTION, SOLUTION EPIDURAL; INFILTRATION; INTRACAUDAL; PERINEURAL AS NEEDED
Status: DISCONTINUED | OUTPATIENT
Start: 2021-05-27 | End: 2021-05-27 | Stop reason: HOSPADM

## 2021-05-27 RX ORDER — PROPOFOL 10 MG/ML
INJECTION, EMULSION INTRAVENOUS AS NEEDED
Status: DISCONTINUED | OUTPATIENT
Start: 2021-05-27 | End: 2021-05-27 | Stop reason: HOSPADM

## 2021-05-27 RX ORDER — FENTANYL CITRATE 50 UG/ML
25-200 INJECTION, SOLUTION INTRAMUSCULAR; INTRAVENOUS
Status: DISCONTINUED | OUTPATIENT
Start: 2021-05-27 | End: 2021-05-27 | Stop reason: HOSPADM

## 2021-05-27 RX ORDER — NALOXONE HYDROCHLORIDE 0.4 MG/ML
0.4 INJECTION, SOLUTION INTRAMUSCULAR; INTRAVENOUS; SUBCUTANEOUS
Status: DISCONTINUED | OUTPATIENT
Start: 2021-05-27 | End: 2021-05-27 | Stop reason: HOSPADM

## 2021-05-27 RX ADMIN — PROPOFOL 50 MG: 10 INJECTION, EMULSION INTRAVENOUS at 16:50

## 2021-05-27 RX ADMIN — SODIUM CHLORIDE: 900 INJECTION, SOLUTION INTRAVENOUS at 16:25

## 2021-05-27 RX ADMIN — PROPOFOL 50 MG: 10 INJECTION, EMULSION INTRAVENOUS at 16:48

## 2021-05-27 RX ADMIN — PROPOFOL 50 MG: 10 INJECTION, EMULSION INTRAVENOUS at 16:47

## 2021-05-27 RX ADMIN — LIDOCAINE HYDROCHLORIDE 40 MG: 20 INJECTION, SOLUTION EPIDURAL; INFILTRATION; INTRACAUDAL; PERINEURAL at 16:47

## 2021-05-27 NOTE — PROCEDURES
[address], [address] [place] [address]        [facility]- Gastroduodenoscopy (EGD) Procedure Note    Robert Desouza  1945  [id number]      Procedure: Endoscopic Gastroduodenoscopy with biopsy    Indication:  Dysphagia/odynophagia, GERD     Pre-operative Diagnosis: see indication above    Post-operative Diagnosis: see findings below    : Ger Kolb MD    Surgical Assistant: Endoscopy Technician-1: Terrence Galeana  Endoscopy RN-1: Jose Butt RN    Implants:  None    Referring Provider:  Blaine Koch MD      Anesthesia/Sedation:  MAC anesthesia Propofol        Procedure Details     After infomed consent was obtained for the procedure, with all risks and benefits of procedure explained the patient was taken to the endoscopy suite and placed in the left lateral decubitus position. Following sequential administration of sedation as per above, the endoscope was inserted into the mouth and advanced under direct vision to third portion of the duodenum. A careful inspection was made as the gastroscope was withdrawn, including a retroflexed view of the proximal stomach; findings and interventions are described below. Findings:   Esophagus:hiatal hernia 3 cm in size     Rest of esophagus was normal, random biopsies taken   Stomach: normal   Duodenum/jejunum: normal      Therapies:  none    Specimens: as above         EBL: None      Complications:   None; patient tolerated the procedure well. Impression:    -See post-procedure diagnoses.     Recommendations:  -Continue acid suppression with PPI and pepcid , doing much better on it  -f/u in 2 months    Signed By: Ger Kolb MD     5/27/2021  4:59 PM

## 2021-05-27 NOTE — H&P
The patient is a 70year old female who presents with a complaint of Dysphagia. The patient presents for due to new symptoms. The onset of the dysphagia has been gradual. The course has been increasing and  is described as moderate .  The dysphagia is described as being located in the neck and throat. The symptoms are aggravated by solids only and  are relieved by taking liquids, regurgitation and repeated swallowing. The symptoms have been associated with heartburn, while the symptoms have not been associated with abdominal pain or weight loss. The patient has been using omeprazole (Prilosec) and PPI is once a day. The patient had an EGD 7 year(s) ago. The patient has normal/adequate nutrition. Note for \"Dysphagia\": She complains of nocturnal reflux, worse lying down. She is not sure how long she's been having dysphagia. She reports normal barium swallow, results unavailable. She is on Diclofenac. She had constipation, resolved on MiraLax. She gets 2nd COVID vaccine next week. 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 Problem List/Past Medical ([facility]; 5/4/2021 2:10 PM)  Neuropathy (G62.9)    MGUS (monoclonal gammopathy of unknown significance) (D47.2)    Hypertension    Hypercholesterolemia    Arthritis    Herpes Genitalis    Reflux    Depression   Menopausal  Hiatal Hernia    HEARTBURN (R12)    H. pylori infection (A04.8)    H pylori ulcer (K27.9)    Abdominal pain, epigastric (R10.13)   s/p lap chol many years ago, took NSAIDS s/p shoulder surgery, h/o GERD on prilosec 20 mg/d  EGD and EUS under MAC to look for ulcers or retained bile duct stones  increase prilosec to 40 mg/d  Family history of colon cancer (V16.0) (Z80.0)      Past Surgical History ([facility]; 5/4/2021 2:10 PM)  Shoulder Surgery   Left, Right. replacements  Foot Surgery   bone surgery  Hysterectomy;  Total    Bowel Surgery    Cholecystectomy    Polypectomy      Allergies ([facility]; 5/4/2021 2:10 PM)  Seasonal    Codeine/Codeine Derivatives      Medication History ([facility]; 5/4/2021 2:11 PM)  Omeprazole  (40MG Capsule DR, 1 (one) Oral daily, Taken starting 11/25/2020) Active. Lisinopril  (5MG Tablet, 1 tab Oral daily) Active. Atorvastatin Calcium  (20MG Tablet, 1 tab Oral daily) Active. buPROPion HCl ER (SR)  (150MG Tablet ER 12HR, 1 tab Oral daily) Active. Amitriptyline HCl  (50MG Tablet, 1 tab Oral daily) Active. Diclofenac Sodium  (50MG Tablet DR, Oral daily) Active. Vitamin D3  (2000UNIT Capsule, Oral daily) Active. Valtrex  (500MG Tablet, Oral two pills a day as needed) Active. Nasonex  (50MCG/ACT Suspension, Nasal daily) Active. Refresh  (1.4-0.6% Solution, Ophthalmic daily) Active. Medications Reconciled     Family History ([facility]; 5/4/2021 2:10 PM)  Colon Cancer   Sister. Stroke   Father. Diabetes Mellitus   First Degree Relatives. Hypertension   Sister. Pancreatic Cancer   Brother. Cancer of kidney (C64.9)   Mother. Social History ([facility]; 5/4/2021 2:10 PM)  Employment status   Retired. Marital status   . Blood Transfusion   No.  Alcohol Use   No  Tobacco Use   Former smoker. Diagnostic Studies History ([facility]; 5/4/2021 2:10 PM)  Colonoscopy  [2019]:  Endoscopy   Unsure    Health Maintenance History ([facility] Renaecha; 5/4/2021 2:10 PM)  Flu Vaccine  [2020]:  Pneumovax   Yes; date unknown  COVID-19 vaccine series started (Z23)          Review of Systems ([facility]ello Cabral; 5/4/2021 2:06 PM)  General Not Present- Chronic Fatigue, Poor Appetite, Weight Gain and Weight Loss. Skin Not Present- Itching, Rash and Skin Color Changes. HEENT Not Present- Hearing Loss and Vertigo. Respiratory Not Present- Difficulty Breathing and TB exposure. Cardiovascular Not Present- Chest Pain, Use of Antibiotics before Dental Procedures and Use of Blood Thinners. Gastrointestinal Present- See HPI. Musculoskeletal Present- Arthritis.  Not Present- Hip Replacement Surgery and Knee Replacement Surgery. Neurological Not Present- Weakness. Psychiatric Present- Depression. Endocrine Not Present- Diabetes and Thyroid Problems. Hematology Not Present- Anemia. Vitals (Felix Elizabeth; 5/4/2021 2:10 PM)  5/4/2021 2:06 PM  Weight: 185.06 lb   Height: 65 in   Weight was reported by patient. Height was reported by patient. Body Surface Area: 1.91 m²   Body Mass Index: 30.8 kg/m²    Temp.: 97.9° F (Thermal Scan)    Pulse: 102 (Regular)    Resp. : 16 (Unlabored)     BP: 130/80 (Sitting, Left Arm, Standard)              Physical Exam Angelina ABBASICNP; 5/4/2021 2:35 PM)  General  Posture - Normal posture. Integumentary  Global Assessment  Examination of related systems reveals - Well-developed, well-nourished and in no acute distress; alert and oriented x 3. Eye  Pupil - Bilateral - Normal, Equal and Round. ENMT  Global Assessment  Examination of related systems reveals - normal voice with no communication aids required. Abdomen  Inspection  Inspection of the abdomen reveals - Soft. Contour - Obese. Palpation/Percussion  Tenderness - Non-Tender. Peripheral Vascular  Upper Extremity  Inspection - Bilateral - Acrocyanotic. Neurologic  Neurologic evaluation reveals  - normal attention span and ability to concentrate. Neuropsychiatric  Mental status exam performed with findings of - thought content normal with ability to perform basic computations and apply abstract reasoning, associations are intact and demonstrates appropriate judgment and insight. The patient's mood and affect are described as  - full, not anxious, not agitated. Assessment & Plan ([employer].  Rosangela CAMARGO; 5/4/2021 2:39 PM)  Acid reflux (K21.9)  Current Plans  Started Famotidine 20MG, 1 (one) Tablet at bedtime, #30, 30 days starting 05/04/2021, Ref. x2.  Dysphagia (R13.10)  Story: Colonoscopy 6/2019: mild sigmoid diverticulosis; 5 years for family history colon cancer  EUS 11/2014: gastritis, 2 superficial gastric ulcers; H pylori +, duodeal biopsy unremarkable, gastric biopsy chronic active gastritis with focal erosions  Impression: She complains of nocturnal reflux, worse lying down. She is not sure how long she's been having dysphagia. She reports normal barium swallow, results unavailable. She is on Diclofenac. She had constipation, resolved on MiraLax. Will continue Prilosec and add Pepcid. Will further evaluate with EGD. Current Plans  Pt Education - How to access health information online: discussed with patient and provided information. ENDOSCOPY, UPPER GI, DIAGNOSTIC ([id number])  Patient is to call me for any questions or concerns. Follow up office visit after procedure  This patient was reviewed and seen in collaboration with Dr. Bakari Dunaway. The patient was counseled at length about the risks of derrick Covid-19 in the sasha-operative and post-operative states including the recovery window of their procedure. The patient was made aware that derrick Covid-19 after a surgical procedure may worsen their prognosis for recovering from the virus and lend to a higher morbidity and or mortality risk. The patient was given the options of postponing their procedure. All of the risks, benefits, and alternatives were discussed. The patient does  wish to proceed with the procedure. Date of Surgery Update: Kerri Solorzano was seen and examined. History and physical has been reviewed. The patient has been examined.  There have been no significant clinical changes since the completion of the originally dated History and Physical.    Signed By: Bakari Dunaway MD     May 27, 2021 4:43 PM

## 2021-05-27 NOTE — DISCHARGE INSTRUCTIONS
[address] [facility]  174 [facility], [address] [place] [address]    EGD DISCHARGE INSTRUCTIONS    Sarah Cote  [id number]  1945    Discomfort:  Sore throat- throat lozenges or warm salt water gargle  redness at IV site- apply warm compress to area; if redness or soreness persist- contact your physician  Gaseous discomfort- walking, belching will help relieve any discomfort  You may not operate a vehicle for 12 hours  You may not engage in an occupation involving machinery or appliances for rest of today  You may not drink alcoholic beverages for at least 12 hours  Avoid making any critical decisions for at least 24 hour  DIET  You may resume your regular diet - however -  remember your colon is empty and a heavy meal will produce gas. Avoid these foods:  vegetables, fried / greasy foods, carbonated drinks    ACTIVITY  You may resume your normal daily activities   Spend the remainder of the day resting -  avoid any strenuous activity. CALL M.D. ANY SIGN OF   Increasing pain, nausea, vomiting  Abdominal distension (swelling)  New increased bleeding (oral or rectal)  Fever (chills)  Pain in chest area  Bloody discharge from nose or mouth  Shortness of breath    Follow-up Instructions:   Call Dr. Aubrie Corral for any questions or problems and follow up with him in 6 to 8 weeks  Telephone # [phone]    ENDOSCOPY FINDINGS:   Your endoscopy showed small hiatal hernia, rest of exam was normal.    Signed By: Aubrie Corral MD     5/27/2021  5:02 PM         Learning About Coronavirus (COVID-19)  Coronavirus (COVID-19): Overview  What is coronavirus (COVID-19)? The coronavirus disease (COVID-19) is caused by a virus. It is an illness that was first found in [place], [place], in December 2019. It has since spread worldwide. The virus can cause fever, cough, and trouble breathing. In severe cases, it can cause pneumonia and make it hard to breathe without help. It can cause death.   Coronaviruses are a large group of viruses. They cause the common cold. They also cause more serious illnesses like Middle East respiratory syndrome (MERS) and severe acute respiratory syndrome (SARS). COVID-19 is caused by a novel coronavirus. That means it's a new type that has not been seen in people before. This virus spreads person-to-person through droplets from coughing and sneezing. It can also spread when you are close to someone who is infected. And it can spread when you touch something that has the virus on it, such as a doorknob or a tabletop. What can you do to protect yourself from coronavirus (COVID-19)? The best way to protect yourself from getting sick is to:  · Avoid areas where there is an outbreak. · Avoid contact with people who may be infected. · Wash your hands often with soap or alcohol-based hand sanitizers. · Avoid crowds and try to stay at least 6 feet away from other people. · Wash your hands often, especially after you cough or sneeze. Use soap and water, and scrub for at least 20 seconds. If soap and water aren't available, use an alcohol-based hand . · Avoid touching your mouth, nose, and eyes. What can you do to avoid spreading the virus to others? To help avoid spreading the virus to others:  · Cover your mouth with a tissue when you cough or sneeze. Then throw the tissue in the trash. · Use a disinfectant to clean things that you touch often. · Stay home if you are sick or have been exposed to the virus. Don't go to school, work, or public areas. And don't use public transportation. · If you are sick:  ? Leave your home only if you need to get medical care. But call the doctor's office first so they know you're coming. And wear a face mask, if you have one.  ? If you have a face mask, wear it whenever you're around other people. It can help stop the spread of the virus when you cough or sneeze. ? Clean and disinfect your home every day.  Use household  and disinfectant wipes or sprays. Take special care to clean things that you grab with your hands. These include doorknobs, remote controls, phones, and handles on your refrigerator and microwave. And don't forget countertops, tabletops, bathrooms, and computer keyboards. When to call for help  Call 911 anytime you think you may need emergency care. For example, call if:  · You have severe trouble breathing. (You can't talk at all.)  · You have constant chest pain or pressure. · You are severely dizzy or lightheaded. · You are confused or can't think clearly. · Your face and lips have a blue color. · You pass out (lose consciousness) or are very hard to wake up. Call your doctor now if you develop symptoms such as:  · Shortness of breath. · Fever. · Cough. If you need to get care, call ahead to the doctor's office for instructions before you go. Make sure you wear a face mask, if you have one, to prevent exposing other people to the virus. Where can you get the latest information? The following health organizations are tracking and studying this virus. Their websites contain the most up-to-date information. Ervin Galan also learn what to do if you think you may have been exposed to the virus. · U.S. Centers for Disease Control and Prevention (CDC): The CDC provides updated news about the disease and travel advice. The website also tells you how to prevent the spread of infection. www.cdc.gov  · World Health Organization [facility]): WHO offers information about the virus outbreaks. WHO also has travel advice. www.who.int  Current as of: April 1, 2020               Content Version: 12.4  © 0916-1863 [employer]. Care instructions adapted under license by your healthcare professional. If you have questions about a medical condition or this instruction, always ask your healthcare professional. [address] any warranty or liability for your use of this information.

## 2021-05-27 NOTE — ANESTHESIA POSTPROCEDURE EVALUATION
Post-Anesthesia Evaluation and Assessment    Patient: Lanny Miller MRN: [id number]  SSN: [id number]    YOB: 1945  Age: 70 y.o. Sex: female      I have evaluated the patient and they are stable and ready for discharge from the PACU. Cardiovascular Function/Vital Signs  Visit Vitals  BP (!) 148/75   Pulse 78   Temp 36.2 °C (97.2 °F)   Resp 21   Ht 5' 5.5\" (1.664 m)   Wt 81.6 kg (180 lb)   SpO2 95%   Breastfeeding No   BMI 29.50 kg/m²       Patient is status post MAC anesthesia for Procedure(s):  ESOPHAGOGASTRODUODENOSCOPY (EGD)   :-  ESOPHAGOGASTRODUODENAL (EGD) BIOPSY. Nausea/Vomiting: None    Postoperative hydration reviewed and adequate. Pain:  Pain Scale 1: Numeric (0 - 10) (05/27/21 1557)  Pain Intensity 1: 4 (05/27/21 1557)   Managed    Neurological Status: At baseline    Mental Status, Level of Consciousness: Alert and  oriented to person, place, and time    Pulmonary Status:   O2 Device: Nasal cannula (05/27/21 1655)   Adequate oxygenation and airway patent    Complications related to anesthesia: None    Post-anesthesia assessment completed. No concerns    Signed By: Reba Winslow MD     May 27, 2021              Procedure(s):  ESOPHAGOGASTRODUODENOSCOPY (EGD)   :-  ESOPHAGOGASTRODUODENAL (EGD) BIOPSY. MAC    <BSHSIANPOST>    INITIAL Post-op Vital signs:   Vitals Value Taken Time   BP 96/64 05/27/21 1705   Temp     Pulse 90 05/27/21 1709   Resp 15 05/27/21 1709   SpO2 96 % 05/27/21 1709   Vitals shown include unvalidated device data.

## 2021-05-27 NOTE — ANESTHESIA PREPROCEDURE EVALUATION
Relevant Problems   No relevant active problems       Anesthetic History   No history of anesthetic complications            Review of Systems / Medical History  Patient summary reviewed, nursing notes reviewed and pertinent labs reviewed    Pulmonary          Smoker         Neuro/Psych         Psychiatric history     Cardiovascular                  Exercise tolerance: >4 METS     GI/Hepatic/Renal     GERD           Endo/Other        Arthritis     Other Findings              Physical Exam    Airway  Mallampati: II  TM Distance: 4 - 6 cm  Neck ROM: normal range of motion   Mouth opening: Normal     Cardiovascular  Regular rate and rhythm,  S1 and S2 normal,  no murmur, click, rub, or gallop             Dental  No notable dental hx       Pulmonary  Breath sounds clear to auscultation               Abdominal  GI exam deferred       Other Findings            Anesthetic Plan    ASA: 2  Anesthesia type: MAC          Induction: Intravenous  Anesthetic plan and risks discussed with: Patient

## 2021-05-27 NOTE — PERIOP NOTES

## 2021-05-29 ENCOUNTER — HOSPITAL ENCOUNTER (OUTPATIENT)
Dept: MRI IMAGING | Age: 76
Discharge: HOME | End: 2021-05-29
Attending: PHYSICAL MEDICINE & REHABILITATION
Payer: MEDICARE

## 2021-05-29 DIAGNOSIS — M54.32 BILATERAL SCIATICA: ICD-10-CM

## 2021-05-29 DIAGNOSIS — M54.31 BILATERAL SCIATICA: ICD-10-CM

## 2021-05-29 DIAGNOSIS — R20.1 HYPOESTHESIA OF SKIN: ICD-10-CM

## 2021-05-29 DIAGNOSIS — M47.817 LUMBOSACRAL SPONDYLOSIS WITHOUT MYELOPATHY: ICD-10-CM

## 2021-05-29 PROCEDURE — 72148 MRI LUMBAR SPINE W/O DYE: CPT

## 2021-09-02 RX ORDER — AMITRIPTYLINE HYDROCHLORIDE 50 MG/1
TABLET, FILM COATED ORAL
Qty: 90 TABLET | Refills: 3 | OUTPATIENT
Start: 2021-09-02

## 2021-09-13 ENCOUNTER — DOCUMENTATION ONLY (OUTPATIENT)
Dept: NEUROLOGY | Age: 76
End: 2021-09-13

## 2021-09-13 NOTE — PROGRESS NOTES
Received follow-up visit note from [address] [place] [address] Dr. Ronda Valentine visit date 8/19/2021 notes that her lymphocytosis is unchanged and her M spike is 0.2, showed normal calcium and creatinine, current problem is a lumbar radiculopathy undergoing physical therapy and seen by [address] [place] [address] who are considering surgery.   Assessment and plan was 78year-old with MGUS but also monoclonal B-cell lymphocytosis both stable follow-up again in 6 months

## 2022-03-01 ENCOUNTER — OFFICE VISIT (OUTPATIENT)
Dept: CARDIOLOGY CLINIC | Age: 77
End: 2022-03-01
Payer: MEDICARE

## 2022-03-01 ENCOUNTER — HOSPITAL ENCOUNTER (OUTPATIENT)
Dept: PREADMISSION TESTING | Age: 77
Discharge: HOME | End: 2022-03-01
Payer: MEDICARE

## 2022-03-01 ENCOUNTER — TRANSCRIBE ORDER (OUTPATIENT)
Dept: REGISTRATION | Age: 77
End: 2022-03-01

## 2022-03-01 ENCOUNTER — HOSPITAL ENCOUNTER (OUTPATIENT)
Dept: PREADMISSION TESTING | Age: 77
Discharge: HOME | End: 2022-03-01
Attending: ORTHOPAEDIC SURGERY

## 2022-03-01 VITALS
DIASTOLIC BLOOD PRESSURE: 80 MMHG | RESPIRATION RATE: 14 BRPM | OXYGEN SATURATION: 98 % | BODY MASS INDEX: 30.37 KG/M2 | WEIGHT: 189 LBS | SYSTOLIC BLOOD PRESSURE: 130 MMHG | HEIGHT: 66 IN | HEART RATE: 70 BPM

## 2022-03-01 VITALS
HEIGHT: 65 IN | WEIGHT: 186.95 LBS | SYSTOLIC BLOOD PRESSURE: 113 MMHG | DIASTOLIC BLOOD PRESSURE: 59 MMHG | HEART RATE: 95 BPM | TEMPERATURE: 97.8 F | RESPIRATION RATE: 16 BRPM | BODY MASS INDEX: 31.15 KG/M2

## 2022-03-01 DIAGNOSIS — U07.1 COVID-19: Primary | ICD-10-CM

## 2022-03-01 DIAGNOSIS — R06.02 SHORT OF BREATH ON EXERTION: Primary | ICD-10-CM

## 2022-03-01 DIAGNOSIS — U07.1 COVID-19: ICD-10-CM

## 2022-03-01 DIAGNOSIS — Z87.891 HISTORY OF TOBACCO USE: ICD-10-CM

## 2022-03-01 DIAGNOSIS — I10 BENIGN ESSENTIAL HTN: ICD-10-CM

## 2022-03-01 DIAGNOSIS — Z01.818 PREOPERATIVE CLEARANCE: ICD-10-CM

## 2022-03-01 LAB
ABO + RH BLD: NORMAL
ANION GAP SERPL CALC-SCNC: 1 MMOL/L (ref 5–15)
APPEARANCE UR: CLEAR
BACTERIA URNS QL MICRO: NEGATIVE /HPF
BILIRUB UR QL: NEGATIVE
BLOOD GROUP ANTIBODIES SERPL: NORMAL
BUN SERPL-MCNC: 17 MG/DL (ref 6–20)
BUN/CREAT SERPL: 18 (ref 12–20)
CALCIUM SERPL-MCNC: 9.2 MG/DL (ref 8.5–10.1)
CHLORIDE SERPL-SCNC: 109 MMOL/L (ref 97–108)
CO2 SERPL-SCNC: 31 MMOL/L (ref 21–32)
COLOR UR: ABNORMAL
CREAT SERPL-MCNC: 0.92 MG/DL (ref 0.55–1.02)
EPITH CASTS URNS QL MICRO: ABNORMAL /LPF
ERYTHROCYTE [DISTWIDTH] IN BLOOD BY AUTOMATED COUNT: 15.8 % (ref 11.5–14.5)
EST. AVERAGE GLUCOSE BLD GHB EST-MCNC: 111 MG/DL
GLUCOSE SERPL-MCNC: 97 MG/DL (ref 65–100)
GLUCOSE UR STRIP.AUTO-MCNC: NEGATIVE MG/DL
HBA1C MFR BLD: 5.5 % (ref 4–5.6)
HCT VFR BLD AUTO: 35.6 % (ref 35–47)
HGB BLD-MCNC: 11 G/DL (ref 11.5–16)
HGB UR QL STRIP: NEGATIVE
INR PPP: 1 (ref 0.9–1.1)
KETONES UR QL STRIP.AUTO: ABNORMAL MG/DL
LEUKOCYTE ESTERASE UR QL STRIP.AUTO: NEGATIVE
MCH RBC QN AUTO: 26.6 PG (ref 26–34)
MCHC RBC AUTO-ENTMCNC: 30.9 G/DL (ref 30–36.5)
MCV RBC AUTO: 86 FL (ref 80–99)
NITRITE UR QL STRIP.AUTO: NEGATIVE
NRBC # BLD: 0 K/UL (ref 0–0.01)
NRBC BLD-RTO: 0 PER 100 WBC
PH UR STRIP: 5.5 [PH] (ref 5–8)
PLATELET # BLD AUTO: 378 K/UL (ref 150–400)
PMV BLD AUTO: 9.5 FL (ref 8.9–12.9)
POTASSIUM SERPL-SCNC: 4.3 MMOL/L (ref 3.5–5.1)
PROT UR STRIP-MCNC: NEGATIVE MG/DL
PROTHROMBIN TIME: 10.6 SEC (ref 9–11.1)
RBC # BLD AUTO: 4.14 M/UL (ref 3.8–5.2)
RBC #/AREA URNS HPF: ABNORMAL /HPF (ref 0–5)
SODIUM SERPL-SCNC: 141 MMOL/L (ref 136–145)
SP GR UR REFRACTOMETRY: 1.02 (ref 1–1.03)
SPECIMEN EXP DATE BLD: NORMAL
UA: UC IF INDICATED,UAUC: ABNORMAL
UROBILINOGEN UR QL STRIP.AUTO: 0.2 EU/DL (ref 0.2–1)
WBC # BLD AUTO: 6.4 K/UL (ref 3.6–11)
WBC URNS QL MICRO: ABNORMAL /HPF (ref 0–4)

## 2022-03-01 PROCEDURE — 86900 BLOOD TYPING SEROLOGIC ABO: CPT

## 2022-03-01 PROCEDURE — G0463 HOSPITAL OUTPT CLINIC VISIT: HCPCS | Performed by: INTERNAL MEDICINE

## 2022-03-01 PROCEDURE — 1090F PRES/ABSN URINE INCON ASSESS: CPT | Performed by: INTERNAL MEDICINE

## 2022-03-01 PROCEDURE — 99204 OFFICE O/P NEW MOD 45 MIN: CPT | Performed by: INTERNAL MEDICINE

## 2022-03-01 PROCEDURE — 81001 URINALYSIS AUTO W/SCOPE: CPT

## 2022-03-01 PROCEDURE — 85027 COMPLETE CBC AUTOMATED: CPT

## 2022-03-01 PROCEDURE — 83036 HEMOGLOBIN GLYCOSYLATED A1C: CPT

## 2022-03-01 PROCEDURE — 85610 PROTHROMBIN TIME: CPT

## 2022-03-01 PROCEDURE — 36415 COLL VENOUS BLD VENIPUNCTURE: CPT

## 2022-03-01 PROCEDURE — 80048 BASIC METABOLIC PNL TOTAL CA: CPT

## 2022-03-01 RX ORDER — ACETAMINOPHEN 500 MG
1000 TABLET ORAL
COMMUNITY

## 2022-03-01 RX ORDER — FLUTICASONE PROPIONATE 50 MCG
1 SPRAY, SUSPENSION (ML) NASAL DAILY
COMMUNITY
End: 2022-10-13

## 2022-03-01 RX ORDER — BUPROPION HYDROCHLORIDE 150 MG/1
150 TABLET, EXTENDED RELEASE ORAL 2 TIMES DAILY
COMMUNITY

## 2022-03-01 RX ORDER — AMITRIPTYLINE HYDROCHLORIDE 75 MG/1
75 TABLET, FILM COATED ORAL
COMMUNITY
End: 2022-10-13

## 2022-03-01 NOTE — PROGRESS NOTES
[facility] [place]:   [phone]    History of Present Illness: Ms. Germán Kumar is a 77 yo F with history of MGUS followed by Dr. Kasey Fields, essential hypertension, mixed hyperlipidemia, tobacco use (quit a year ago), referred by Dr. Ren Galaviz and ortho for preop cardiac evaluation prior to surgery on 03/08/2022. She denies any prior cardiac history. From a symptom standpoint, she has been more short of breath with exertion over the last few years. No chest pain. She just has rare palpitations. No syncope. She is compensated on exam with clear lungs and just trace lower extremity edema in her left ankle. She did twist her ankle a few weeks ago. She does have a I-II/VI systolic murmur at the left sternal border. Assessment and Plan:   1. Preop cardiac evaluation. With her shortness of breath, will obtain an echocardiogram for further evaluation. If this is unrevealing, would consider her low risk for cardiac complications. Some of the shortness of breath could be due to long term tobacco use, though she has quit now. Echo will be done 3/7/22 (earliest available) so will do clearance as soon as this is completed. 2. Essential hypertension. Blood pressure is controlled. 3. Mixed hyperlipidemia. Tolerating statin. 4. Tobacco use. Quit a year ago. 5. MGUS. Stable per the patient and followed closely by Dr. Kasey Fields. 6. Neuropathy. Soc hx. Quit tobacco 1 yr ago. 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 Fam hx. No early CAD.       She  has a past medical history of Arthritis, B12 deficiency, DDD (degenerative disc disease), cervical (11/2016), GERD (gastroesophageal reflux disease), History of screening mammography (5/3/2012), Lumbar stenosis, MGUS (monoclonal gammopathy of unknown significance), Neuropathy (11/3/2011), S/P arthroscopy of shoulder (11/3/2011), S/P cholecystectomy (11/3/2011), S/P hemorrhoidectomy (11/3/2011), S/P LUCA-BSO (11/3/2011), TMJ pain dysfunction syndrome, and Unspecified adverse effect of anesthesia. All other systems negative except as above. PE  Vitals:    03/01/22 0834   BP: 130/80   Pulse: 70   Resp: 14   SpO2: 98%   Weight: 189 lb (85.7 kg)   Height: 5' 5.5\" (1.664 m)    Body mass index is 30.97 kg/m².    General appearance - alert, well appearing, and in no distress  Mental status - affect appropriate to mood  Eyes - sclera anicteric, moist mucous membranes  Neck - supple, no JVD  Chest - clear to auscultation, no wheezes, rales or rhonchi  Heart - normal rate, regular rhythm, normal S1, S2, I-II/VI systolic murmur  Abdomen - soft, nontender, nondistended, no masses or organomegaly  Neurological -  no focal deficit  Extremities - peripheral pulses normal, no pedal edema      Recent Labs:  Lab Results   Component Value Date/Time    Cholesterol, total 217 (H) 03/29/2017 11:55 AM    HDL Cholesterol 53 03/29/2017 11:55 AM    LDL, calculated 138 (H) 03/29/2017 11:55 AM    Triglyceride 130 03/29/2017 11:55 AM    CHOL/HDL Ratio 3.5 08/29/2009 09:02 AM     Lab Results   Component Value Date/Time    Creatinine (POC) 0.9 10/11/2010 11:13 AM    Creatinine 0.65 03/29/2017 11:55 AM     Lab Results   Component Value Date/Time    BUN 11 03/29/2017 11:55 AM    BUN (POC) 8 (L) 10/11/2010 11:13 AM     Lab Results   Component Value Date/Time    Potassium 4.3 03/29/2017 11:55 AM     No results found for: HBA1C, [id number], [id number]  Lab Results   Component Value Date/Time    Hemoglobin (POC) 13.6 10/11/2010 11:13 AM    HGB 12.1 03/29/2017 11:55 AM     Lab Results   Component Value Date/Time    PLATELET 783 (H) 80/96/8993 11:55 AM       Reviewed:  Past Medical History:   Diagnosis Date    Arthritis     B12 deficiency     DDD (degenerative disc disease), cervical 11/2016    GERD (gastroesophageal reflux disease)     History of screening mammography 5/3/2012    Lumbar stenosis     MGUS (monoclonal gammopathy of unknown significance)     Neuropathy 11/3/2011    S/P arthroscopy of shoulder 11/3/2011    S/P cholecystectomy 11/3/2011    S/P hemorrhoidectomy 11/3/2011    S/P LUCA-BSO 11/3/2011    TMJ pain dysfunction syndrome     Unspecified adverse effect of anesthesia     loss of appetite after shoulder surgery     Social History     Tobacco Use   Smoking Status Former Smoker    Packs/day: 0.25    Types: Cigarettes   Smokeless Tobacco Former User    Quit date: 3/27/2021     Social History     Substance and Sexual Activity   Alcohol Use Yes    Comment: rare     Allergies   Allergen Reactions    Codeine Itching     Synthetic and GI. Itching under skin. Current Outpatient Medications   Medication Sig    amitriptyline (ELAVIL) 75 mg tablet Take 75 mg by mouth nightly.  buPROPion SR (WELLBUTRIN SR) 150 mg SR tablet Take 150 mg by mouth daily.  fluticasone propionate (Children's Flonase Allergy Rlf) 50 mcg/actuation nasal spray 1 [place] by Both Nostrils route daily.  acetaminophen (Tylenol Extra Strength) 500 mg tablet Take 1,000 mg by mouth every six (6) hours as needed for Pain.  famotidine (Pepcid) 20 mg tablet Take 20 mg by mouth daily.  atorvastatin (LIPITOR) 20 mg tablet Take 20 mg by mouth daily.  cyanocobalamin (VITAMIN B12) 1,000 mcg/mL injection 1,000 mcg every month.  diclofenac EC (VOLTAREN) 75 mg EC tablet Take 75 mg by mouth two (2) times a day.  lisinopril (PRINIVIL, ZESTRIL) 5 mg tablet Take 5 mg by mouth daily.  valACYclovir (VALTREX) 1 gram tablet Take 0.5 Tabs by mouth two (2) times a day. (Patient taking differently: Take 500 mg by mouth as needed.)    omeprazole (PRILOSEC) 40 mg capsule Take 40 mg by mouth daily.  Cholecalciferol, Vitamin D3, (VITAMIN D3) 2,000 unit cap capsule Take 2,000 Units by mouth daily.  CARBOXYMETHYLCELLULOSE SODIUM (REFRESH OP) Administer 2 Drops to both eyes as needed.  bupropion HCl (WELLBUTRIN PO) Take  by mouth.  amitriptyline (ELAVIL) 50 mg tablet Take 1 Tab by mouth nightly. No current facility-administered medications for this visit.        Kendra Espinal MD  [address] [facility] [place]  [address] [facility] [address]  [address]

## 2022-03-01 NOTE — PERIOP NOTES
[address] [facility] INSTRUCTIONS  ORTHOPAEDIC    Surgery Date:   3/8/22    [facility] staff will call you between 4 PM- 8 PM the day before surgery with your arrival time. If your surgery is on a Monday, we will call you the preceding Friday. Please call [phone] after 8 PM if you did not receive your arrival time. 1. Please report to [facility] Patient Access/Admitting on the 1st floor. Bring your insurance card, photo identification, and any copayment (if applicable). 2. If you are going home the same day of your surgery, you must have a responsible adult to drive you home. You need to have a responsible adult to stay with you the first 24 hours after surgery and you should not drive a car for 24 hours following your surgery. 3. Do NOT eat any solid foods after midnight the night before surgery including candy, mints or gum. You may drink clear liquids from midnight until 1 hour prior to arrival time. You may drink up to 12 ounces at one time every 4 hours. 4. Do NOT drink alcohol or smoke 24 hours before surgery. STOP smoking for 14 days prior as it helps with breathing and healing after surgery. 5. If your arrival time is 3pm or later, you may eat a light breakfast before 8am (toast, bagel-no butter, black coffee, plain tea, fruit juice-no pulp) Please note special instructions, if applicable, below for medications. 6. If you are being admitted to the hospital,please leave personal belongings/luggage in your car until you have an assigned hospital room number. 7. Please wear comfortable clothes. Wear your glasses instead of contacts. We ask that all money, jewelry and valuables be left at home. Wear no make up, particularly mascara, the day of surgery. 8.  All body piercings, rings, and jewelry need to be removed and left at home. Please remove any nail polish or artificial nails from your fingernails. Please wear your hair loose or down.  Please no pony-tails, buns, or any metal hair accessories. If you shower the morning of surgery, please do not apply any lotions or powders afterwards. You may wear deodorant. Do not shave any body area within 24 hours of your surgery. 9. Please follow all instructions to avoid any potential surgical cancellation. 10. Should your physical condition change, (i.e. fever, cold, flu, etc.) please notify your surgeon as soon as possible. 11. It is important to be on time. If a situation occurs where you may be delayed, please call:  [phone] / [phone] on the day of surgery. 12. The Preadmission Testing staff can be reached at [phone]. 13. Special instructions: BRING ADVANCED DIRECTIVE AND NASAL SPRAY    Current Outpatient Medications   Medication Sig    amitriptyline (ELAVIL) 75 mg tablet Take 75 mg by mouth nightly.  buPROPion SR (WELLBUTRIN SR) 150 mg SR tablet Take 150 mg by mouth nightly.  fluticasone propionate (Children's Flonase Allergy Rlf) 50 mcg/actuation nasal spray 1 [place] by Both Nostrils route daily.  acetaminophen (Tylenol Extra Strength) 500 mg tablet Take 1,000 mg by mouth every six (6) hours as needed for Pain.  famotidine (Pepcid) 20 mg tablet Take 20 mg by mouth nightly.  atorvastatin (LIPITOR) 20 mg tablet Take 20 mg by mouth Every morning.  cyanocobalamin (VITAMIN B12) 1,000 mcg/mL injection 1,000 mcg every month.  diclofenac EC (VOLTAREN) 75 mg EC tablet Take 75 mg by mouth two (2) times a day.  lisinopril (PRINIVIL, ZESTRIL) 5 mg tablet Take 5 mg by mouth Every morning.  valACYclovir (VALTREX) 1 gram tablet Take 0.5 Tabs by mouth two (2) times a day. (Patient taking differently: Take 500 mg by mouth as needed.)    omeprazole (PRILOSEC) 40 mg capsule Take 40 mg by mouth Every morning.  Cholecalciferol, Vitamin D3, (VITAMIN D3) 2,000 unit cap capsule Take 2,000 Units by mouth daily.  CARBOXYMETHYLCELLULOSE SODIUM (REFRESH OP) Administer 2 Drops to both eyes as needed.      No current facility-administered medications for this encounter. 1. YOU MUST ONLY TAKE THESE MEDICATIONS THE MORNING OF SURGERY WITH A SIP OF WATER: ATORVASTATIN, FLUTICASONE, OMEPRAZOLE  2. MEDICATIONS TO TAKE THE MORNING OF SURGERY ONLY IF NEEDED: TYLENOL, VALACYSCLOVIR, EYE DROPS  3. HOLD these prescription medications BEFORE Surgery: DICLOFENAC 3 DAYS PRIOR TO SURGERY  4. Ask your surgeon/prescribing physician about when/if to STOP taking these medications: VIT B12 INJECTION  5. Stop any non-steroidal anti-inflammatory drugs (i.e. Ibuprofen, Naproxen, Advil, Aleve) 3 days before surgery. You may take Tylenol. STOP all vitamins and herbal supplements 1 week prior to  surgery. 6. If you are currently taking Plavix, Coumadin, or any other blood-thinning/anticoagulant medication contact your prescribing physician for instructions. Preventing Infections Before and After  Your Surgery    IMPORTANT INSTRUCTIONS    You play an important role in your health and preparation for surgery. To reduce the germs on your skin you will need to shower with CHG soap (Chorhexidine gluconate 4%) two times before surgery. CHG soap (Hibiclens, Hex-A-Clens or store brand)   CHG soap will be provided at your Preadmission Testing (PAT) appointment.  If you do not have a PAT appointment before surgery, you may arrange to  CHG soap from our office or purchase CHG soap at a pharmacy, grocery or department store.  You need to purchase TWO 4 ounce bottles to use for your 2 showers. Steps to follow:  1. Wash your hair with your normal shampoo and your body with regular soap and rinse well to remove shampoo and soap from your skin. 2. Wet a clean washcloth and turn off the shower. 3. Put CHG soap on washcloth and apply to your entire body from the neck down. Do not use on your head, face or private parts(genitals). Do not use CHG soap on open sores, wounds or areas of skin irritation. 4. Wash you body gently for 5 minutes. Do not wash your skin too hard. This soap does not create lather. Pay special attention to your underarms and from your belly button to your feet. 5. Turn the shower back on and rinse well to get CHG soap off your body. 6. Pat your skin dry with a clean, dry towel. Do not apply lotions or moisturizer. 7. Put on clean clothes and sleep on fresh bed sheets and do not allow pets to sleep with you. Shower with CHG soap 2 times before your surgery   The evening before your surgery   The morning of your surgery      Tips to help prevent infections after your surgery:  1. Protect your surgical wound from germs:  ? Hand washing is the most important thing you and your caregivers can do to prevent infections. ? Keep your bandage clean and dry! ? Do not touch your surgical wound. 2. Use clean, freshly washed towels and washcloths every time you shower; do not share bath linens with others. 3. Until your surgical wound is healed, wear clothing and sleep on bed linens each day that are clean and freshly washed. 4. Do not allow pets to sleep in your bed with you or touch your surgical wound. 5. Do not smoke  smoking delays wound healing. This may be a good time to stop smoking. 6. If you have diabetes, it is important for you to manage your blood sugar levels properly before your surgery as well as after your surgery. Poorly managed blood sugar levels slow down wound healing and prevent you from healing completely. Prevention of Infection  Testing for Staphylococcus aureus on your skin before surgery    Staphylococcus aureus (staph) is a common bacteria that is found on the body. It normally does not cause infection on healthy skin. Before surgery, you will be tested to see if you have staph by swabbing the inside of your nose. When you have an incision with surgery, the goal is to protect that incision from infection.  Removal of the staph bacteria before surgery can decrease the risk of a surgical site infection. If your nose swab is positive for staph you will be called. Your treatment will include 2 steps:   Prescription for Mupirocin ointment to be used in each nostril twice a day for 5 days.  Showering with Chlorhexidine (CHG) liquid soap for 5 days prior to surgery. How to use Mupirocin ointment in your nose  1.  the prescription from your pharmacy. You will receive a large tube of ointment which will be big enough for all of your treatments. You will apply this ointment to each nostril 2 times a day for 5 days. 2. Wash your hands with  gel or soap and water for 20 seconds before using ointment. 3. Place a pea-sized amount of ointment on a cotton Q-tip. 4. Apply ointment just inside of each nostril with the Q-tip. Do not push Q-tip or ointment deep inside you nose. 5. Press your nostrils together and massage for a few seconds. 6. Wash your hands with  gel or soap and water after you are finished. 7. Do not get ointment near your eyes. If it gets into your eyes, rinse them with cool water. 8. If you need to use nasal spray, clean the tip of the bottle with alcohol before use and do not use both at the same time. 9. If you are scheduled for COVID testing during the 5 days, do NOT apply morning dose until after the COVID test has been performed. How to use Chlorhexidine (CHG) 4% liquid soap  1. Purchase an 8 ounce bottle of CHG liquid soap (Chlorhexidine 4%, Hibiclens, Hex-A-Clens or store brand) at a pharmacy or grocery store. 2. Wash your hair with your normal shampoo and your body with regular soap and rinse well to remove shampoo and soap from your skin. 3. Wet a clean washcloth and turn off the shower. 4. Put CHG soap on washcloth and apply to your entire body from the neck down. Do not use on your head, face or private parts(genitals). Do not use CHG soap on open sores, wounds or areas of skin irritation. 5. Wash your body gently for 5 minutes.  Do not wash your skin too hard. This soap does not create lather. Pay special attention to your underarms and from your belly button to your feet. 6. Turn the shower back on and rinse well to get CHG soap off your body. 7. Pat your skin dry with a clean, dry towel. Do not apply lotions or moisturizer. 8. Put on clean clothes and sleep on fresh bed sheets the night before surgery. Do not allow pets to sleep with you. Eating and Drinking Before Surgery     You may eat a regular dinner at the usual time on the day before your surgery.  Do NOT eat any solid foods after midnight unless your arrival time at the hospital is 3pm or later.  You may drink clear liquids only from 12 midnight until 1 hours prior to your arrival time at the hospital on the day of your surgery. Do NOT drink alcohol.  Clear liquids include:  o Water  o Fruit juices without pulp( i.e. apple juice)  o Carbonated beverages  o Black coffee (no cream/milk)  o Tea (no cream/milk)  o Gatorade   You may drink up to 12-16 ounces at one time every 4 hours between the hours of midnight and 1 hour before your arrival time at the hospital. Example- if your arrival time at the hospital is 6am, you may drink 12-16 ounces of clear liquids no later than 5am.   If your arrival time at the hospital is 3pm or later, you may eat a light breakfast before 8am.   A light breakfast includes:  o Toast or bagel (no butter)  o Black coffee (no cream/milk)  o Tea (no cream/milk)  o Fruit juices without pulp ( i.e. apple juice)  o Do NOT eat meat, eggs, vegetables or fruit   If you have any questions, please contact your surgeon's office. [address]   Instructions for Pre-Surgery COVID-19 Testing     Across our ministry we have established standard guidelines to ensure the health and safety of our patients, residents and associates as we resume elective services for patients.  All patients presenting for surgery are required to have a COVID-19 test result within 96 hours of their scheduled surgery. [address] [place] [address] is providing this test free of charge to the patient. Instructions for COVID-19 Testing:     Patients will receive a call from Pre-Admission Testing 4-5 days prior to surgery to schedule a date and time to come to the [address] [place] [address] Drive for their COVID-19 test   Patients are advised to self-quarantine after testing until their scheduled surgery   Once on site, patients will be registered and receive COVID test in their vehicle   If a patient is scheduled for normal Pre Admission Testing 96 hours from date of surgery, the patient will still have their COVID test done at the [address] [place] [address] located at [address] [place] [address] Positive results will be shared with the surgeon and anesthesiologist and may result in cancellation of the elective procedure    Testing Hours and Location:   Address:  [address] [facility] Up Pre Admission [facility] in the Discharge Lot on [facility] (Map Attached)  [address], [address]   Hours: Monday- Friday 7a-3p    PAT Phone Number: [phone]            Patient Information Regarding COVID Restrictions    Patients are advised to self-quarantine after COVID testing up to the day of the scheduled procedure. Day of Procedure     Please park in the parking deck or any designated visitor parking lot.  Enter the facility through the Main Entrance of the hospital.   A temperature check and appropriate symptom/exposure screening will be done prior to entry to the facility.  On the day of surgery, please provide the cell phone number of the person who will be waiting for you to the Patient Access representative at the time of registration.  Please wear a mask on the day of your procedure.  We are now allowing one designated visitor per stay. Pediatric patients may have 2 designated visitors.  This one person may come in with you on the day of your procedure.  No visitors under the age of 13.  The designated visitor must also wear a mask.  Once your procedure and the immediate recovery period is completed, a nurse in the recovery area will contact your designated visitor to inform them of your room number or to otherwise review other pertinent information regarding your care.  Social distancing practices are to be adhered to in waiting areas and the cafeteria. The patient was contacted in person. She verbalized understanding of all instructions does not  need reinforcement.

## 2022-03-01 NOTE — LETTER
Patient: Sachin Vogel   YOB: 1945  Date of Visit: 3/1/2022      Dear Glenn Hernandez MD  [address] [facility] [address] [address]  Via Fax: [phone]: Thank you for referring Ms. Rhina Prescott to me for evaluation/treatment. Below are the relevant portions of my assessment and plan of care. Ms. 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 Sonal Prado and ortho for preop cardiac evaluation prior to surgery on 03/08/2022. She denies any prior cardiac history. From a symptom standpoint, she has been more short of breath with exertion over the last few years. No chest pain. She just has rare palpitations. No syncope. She is compensated on exam with clear lungs and just trace lower extremity edema in her left ankle. She did twist her ankle a few weeks ago. She does have a I-II/VI systolic murmur at the left sternal border. Assessment and Plan:   1. Preop cardiac evaluation. With her shortness of breath, will obtain an echocardiogram for further evaluation. If this is unrevealing, would consider her low risk for cardiac complications. Some of the shortness of breath could be due to long term tobacco use, though she has quit now. Echo will be done 3/7/22 (earliest available) so will do clearance as soon as this is completed. 2. Essential hypertension. Blood pressure is controlled. 3. Mixed hyperlipidemia. Tolerating statin. 4. Tobacco use. Quit a year ago. 5. MGUS. Stable per the patient and followed closely by Dr. Man Antoine. 6. Neuropathy. If you have questions, please do not hesitate to call me.     Sincerely,      Esthela Herrera MD

## 2022-03-01 NOTE — PERIOP NOTES
PT WAS SENT TO [facility] FOR CARDIAC CLEARANCE BY SURGEON. PT HAD APPT WITH ABIGAIL CORDERO NP ON 2/18/22 AND SCHEDULED FOR LEXISCAN STRESS TEST 3/3/22. PT WAS ALREADY FOLLOWED BY DR BANGURA, SAW HIM FOR CLEARANCE, NOTES IN CC.  PT SCHEDULED FOR ECHO 3/7/22. DR BANGURA IS TO GIVE FINAL CLEARANCE ONCE STRESS TEST AND ECHO ARE COMPLETED. NOTES WILL BE IN CC.      PAT Nutrition Screen    1. Has the patient had an unplanned weight loss of 10% of body weight or more in the last 6 months? No = 0   2. Has the patient been eating less than 50% of their normal diet in the preceding week? No = 0       Patient instructed on Non-Diabetic pre-operative nutrition plan to start 5 days prior to surgery. Patient given 10 shakes and 3 pre-surgery drinks. Opportunity given for questions and all questions answered. CALLED DR SHARPE FOR HEMATOLOGY NOTES    HEMATOLOGY NOTES RECEIVED AND PLACED ON CHART. CARDIOLOGY NOTES AND EKG RECEIVED FROM [facility] AND PLACED ON CHART.

## 2022-03-02 LAB
BACTERIA SPEC CULT: NORMAL
BACTERIA SPEC CULT: NORMAL
SERVICE CMNT-IMP: NORMAL

## 2022-03-02 NOTE — PERIOP NOTES
PAT Nurse Practitioner   Pre-Operative Chart Review/Assessment:-ORTHOPEDIC/NEUROSURGICAL SPINE                Patient Name:  Deana Saab                                                           Age:   78 y.o.    :  1945     Today's Date:  3/3/2022     Date of PAT:   3/1/2022      Date of Surgery:    3/8/2022      Procedure(s):  L4-S1 Fusion     Surgeon:   Dr. Hilton Coffee:       1)  PCP: Dr. Cathy De La Rosa        2)  Cardiac Clearance: Pt followed by Dr. Bryanna Johnston, however went to [facility] for clearance. LOV w/ Dr. Bryanna Johnston 3/1/22. ECHO on 3/7/22 WNL. Clearance obtained on 3/7/22. LOV w/ Dr. Crane([facility]) 22. Stress test done 3/3/22. Mult attempts made to obtained report, unsuccessful. 3)  Diabetic Treatment Consult: Not indicated. A1c-5.5       4)  Sleep Apnea evaluation:  Not indicated. RAFAEL Score 3.       5)  Treatment for MRSA/Staph Aureus: Neg       6)  Additional Concerns: Former smoker, GERD, TMJ, MGUS(Stable; followed by Dr. Sheila Otero)              Vital Signs:         Vitals:    22 1125   BP: (!) 113/59   Pulse: 95   Resp: 16   Temp: 97.8 °F (36.6 °C)   Weight: 84.8 kg (186 lb 15.2 oz)   Height: 5' 4.5\" (1.638 m)          Preoperative Nutrition Screen (YARIEL)   Patient's Age: 78 y.o. Patient's BMI: Estimated body mass index is 31.59 kg/m² as calculated from the following:    Height as of this encounter: 5' 4.5\" (1.638 m). Weight as of this encounter: 84.8 kg (186 lb 15.2 oz). If the answer to any of the following is Yes, then recommend prescribe Oral Nutrition Supplements (ONS) for at least 5 days prior to surgery and/or order referral to dietitian for further assessment and nutrition therapy. 1. Does the patient have a documented serum albumin less than 3.0 within the last 90 days? Unknown = 0       2. Is patient's BMI less than 18.5 (or less than 20 if age over 72)? No = 0        3.  Has the patient had an unplanned weight loss of 10% of body weight or more in the last 6 months? No = 0   4. Has the patient been eating less than 50% of their normal diet in the preceding week? No = 0   YARIEL Score (number of Yes responses), 0-4 0     Plan:   2 daily immuno nutrition shakes, 5 days prior to surgery and 5 days post-operatively. 3 pre-surgery drinks. Electronically signed by Dulce Maria Ferris NP on 3/2/22 at 8:30 AM    ____________________________________________  PAST MEDICAL HISTORY  Past Medical History:   Diagnosis Date    Arthritis     B12 deficiency     Chronic pain     LOWER BACK    DDD (degenerative disc disease), cervical 11/2016    GERD (gastroesophageal reflux disease)     History of screening mammography 5/3/2012    Lumbar radiculopathy     Lumbar spondylosis     Lumbar stenosis     MGUS (monoclonal gammopathy of unknown significance)     Neuropathy 11/3/2011    Peripheral neuropathy     S/P arthroscopy of shoulder 11/3/2011    S/P cholecystectomy 11/3/2011    S/P hemorrhoidectomy 11/3/2011    S/P LUCA-BSO 11/3/2011    TMJ pain dysfunction syndrome     Unspecified adverse effect of anesthesia     loss of appetite after shoulder surgery      ____________________________________________  PAST SURGICAL HISTORY  Past Surgical History:   Procedure Laterality Date    COLONOSCOPY N/A 6/25/2019    COLONOSCOPY performed by Josette Butcher MD at [facility]. [address] 81, COLON, DIAGNOSTIC  2001    normal ([place])    HX CHOLECYSTECTOMY      HX COLOSTOMY  1985    BOWEL NICKED DURING HYSTERECTOMY REQUIRING COLOSTOMY    HX COLOSTOMY TAKE DOWN  1985    HX HYSTERECTOMY      HX SHOULDER REPLACEMENT Bilateral      ____________________________________________  HOME MEDICATIONS    Current Outpatient Medications   Medication Sig    amitriptyline (ELAVIL) 75 mg tablet Take 75 mg by mouth nightly.  buPROPion SR (WELLBUTRIN SR) 150 mg SR tablet Take 150 mg by mouth nightly.     fluticasone propionate (Children's Flonase Allergy Rlf) 50 mcg/actuation nasal spray 1 [place] by Both Nostrils route daily.  acetaminophen (Tylenol Extra Strength) 500 mg tablet Take 1,000 mg by mouth every six (6) hours as needed for Pain.  famotidine (Pepcid) 20 mg tablet Take 20 mg by mouth nightly.  atorvastatin (LIPITOR) 20 mg tablet Take 20 mg by mouth Every morning.  cyanocobalamin (VITAMIN B12) 1,000 mcg/mL injection 1,000 mcg every month.  diclofenac EC (VOLTAREN) 75 mg EC tablet Take 75 mg by mouth two (2) times a day.  lisinopril (PRINIVIL, ZESTRIL) 5 mg tablet Take 5 mg by mouth Every morning.  valACYclovir (VALTREX) 1 gram tablet Take 0.5 Tabs by mouth two (2) times a day. (Patient taking differently: Take 500 mg by mouth as needed.)    omeprazole (PRILOSEC) 40 mg capsule Take 40 mg by mouth Every morning.  Cholecalciferol, Vitamin D3, (VITAMIN D3) 2,000 unit cap capsule Take 2,000 Units by mouth daily.  CARBOXYMETHYLCELLULOSE SODIUM (REFRESH OP) Administer 2 Drops to both eyes as needed.      No current facility-administered medications for this encounter.      ____________________________________________  ALLERGIES  Allergies   Allergen Reactions    Lactose Other (comments)     \"BLOATING \"      ____________________________________________  SOCIAL HISTORY  Social History     Tobacco Use    Smoking status: Former Smoker     Packs/day: 0.25     Years: 35.00     Pack years: 8.75     Types: Cigarettes     Quit date: 3/1/2021     Years since quittin.0    Smokeless tobacco: Never Used   Substance Use Topics    Alcohol use: Yes     Comment: rare      ____________________________________________   Internal Administration   First Dose COVID-19, Pfizer Purple top, DILUTE for use, 12+ yrs, 30mcg/0.3mL dose  2021   Second Dose COVID-19, Pfizer Purple top, DILUTE for use, 12+ yrs, 30mcg/0.3mL dose  2021      Last COVID Lab SARS-CoV-2 ( )   Date Value   2022 Not detected        ___________________________________________    Labs: Hospital Outpatient Visit on 03/01/2022   Component Date Value Ref Range Status    Sodium 03/01/2022 141  136 - 145 mmol/L Final    Potassium 03/01/2022 4.3  3.5 - 5.1 mmol/L Final    Chloride 03/01/2022 109* 97 - 108 mmol/L Final    CO2 03/01/2022 31  21 - 32 mmol/L Final    Anion gap 03/01/2022 1* 5 - 15 mmol/L Final    Glucose 03/01/2022 97  65 - 100 mg/dL Final    BUN 03/01/2022 17  6 - 20 MG/DL Final    Creatinine 03/01/2022 0.92  0.55 - 1.02 MG/DL Final    BUN/Creatinine ratio 03/01/2022 18  12 - 20   Final    GFR est AA 03/01/2022 >60  >60 ml/min/1.73m2 Final    GFR est non-AA 03/01/2022 60* >60 ml/min/1.73m2 Final    Estimated GFR is calculated using the IDMS-traceable Modification of Diet in Renal Disease (MDRD) Study equation, reported for both  Americans (GFRAA) and non- Americans (GFRNA), and normalized to 1.73m2 body surface area. The physician must decide which value applies to the patient.     Calcium 03/01/2022 9.2  8.5 - 10.1 MG/DL Final    WBC 03/01/2022 6.4  3.6 - 11.0 K/uL Final    RBC 03/01/2022 4.14  3.80 - 5.20 M/uL Final    HGB 03/01/2022 11.0* 11.5 - 16.0 g/dL Final    HCT 03/01/2022 35.6  35.0 - 47.0 % Final    MCV 03/01/2022 86.0  80.0 - 99.0 FL Final    MCH 03/01/2022 26.6  26.0 - 34.0 PG Final    MCHC 03/01/2022 30.9  30.0 - 36.5 g/dL Final    RDW 03/01/2022 15.8* 11.5 - 14.5 % Final    PLATELET 87/48/2509 883  150 - 400 K/uL Final    MPV 03/01/2022 9.5  8.9 - 12.9 FL Final    NRBC 03/01/2022 0.0  0  WBC Final    ABSOLUTE NRBC 03/01/2022 0.00  0.00 - 0.01 K/uL Final    Crossmatch Expiration 03/01/2022 03/11/2022,2359   Final    ABO/Rh(D) 03/01/2022 O POSITIVE   Final    Antibody screen 03/01/2022 NEG   Final    INR 03/01/2022 1.0  0.9 - 1.1   Final    A single therapeutic range for Vit K antagonists may not be optimal for all indications - see June, 2008 issue of [employer] Evidence-Based Clinical Practice Guidelines, 8th Edition.  Prothrombin time 03/01/2022 10.6  9.0 - 11.1 sec Final    Color 03/01/2022 YELLOW/STRAW    Final    Color Reference Range: Straw, Yellow or Dark Yellow    Appearance 03/01/2022 CLEAR  CLEAR   Final    Specific gravity 03/01/2022 1.025  1.003 - 1.030   Final    pH (UA) 03/01/2022 5.5  5.0 - 8.0   Final    Protein 03/01/2022 Negative  NEG mg/dL Final    Glucose 03/01/2022 Negative  NEG mg/dL Final    Ketone 03/01/2022 TRACE* NEG mg/dL Final    Bilirubin 03/01/2022 Negative  NEG   Final    Blood 03/01/2022 Negative  NEG   Final    Urobilinogen 03/01/2022 0.2  0.2 - 1.0 EU/dL Final    Nitrites 03/01/2022 Negative  NEG   Final    Leukocyte Esterase 03/01/2022 Negative  NEG   Final    WBC 03/01/2022 0-4  0 - 4 /hpf Final    RBC 03/01/2022 0-5  0 - 5 /hpf Final    Epithelial cells 03/01/2022 FEW  FEW /lpf Final    Epithelial cell category consists of squamous cells and /or transitional urothelial cells. Renal tubular cells, if present, are separately identified as such.  Bacteria 03/01/2022 Negative  NEG /hpf Final    UA:UC IF INDICATED 03/01/2022 CULTURE NOT INDICATED BY UA RESULT  CNI   Final    Hemoglobin A1c 03/01/2022 5.5  4.0 - 5.6 % Final    Comment: NEW METHOD  PLEASE NOTE NEW REFERENCE RANGE  (NOTE)  HbA1C Interpretive Ranges  <5.7              Normal  5.7 - 6.4         Consider Prediabetes  >6.5              Consider Diabetes      Est. average glucose 03/01/2022 111  mg/dL Final    Special Requests: 03/01/2022 NO SPECIAL REQUESTS    Final    Culture result: 03/01/2022 MRSA NOT PRESENT    Final       Skin:     Denies open wounds, cuts, sores, rashes or other areas of concern in PAT assessment.         Hailey Aguayo NP

## 2022-03-03 ENCOUNTER — TRANSCRIBE ORDER (OUTPATIENT)
Dept: REGISTRATION | Age: 77
End: 2022-03-03

## 2022-03-03 ENCOUNTER — HOSPITAL ENCOUNTER (OUTPATIENT)
Dept: PREADMISSION TESTING | Age: 77
Discharge: HOME | End: 2022-03-03
Attending: ORTHOPAEDIC SURGERY
Payer: MEDICARE

## 2022-03-03 DIAGNOSIS — U07.1 COVID-19: ICD-10-CM

## 2022-03-03 DIAGNOSIS — U07.1 COVID-19: Primary | ICD-10-CM

## 2022-03-03 LAB
SARS-COV-2, XPLCVT: NOT DETECTED
SOURCE, COVRS: NORMAL

## 2022-03-03 PROCEDURE — U0005 INFEC AGEN DETEC AMPLI PROBE: HCPCS

## 2022-03-07 ENCOUNTER — ANESTHESIA EVENT (OUTPATIENT)
Dept: SURGERY | Age: 77
DRG: 455 | End: 2022-03-07
Payer: MEDICARE

## 2022-03-07 ENCOUNTER — ANCILLARY PROCEDURE (OUTPATIENT)
Dept: CARDIOLOGY CLINIC | Age: 77
DRG: 455 | End: 2022-03-07
Payer: MEDICARE

## 2022-03-07 VITALS
WEIGHT: 186 LBS | DIASTOLIC BLOOD PRESSURE: 59 MMHG | HEIGHT: 65 IN | BODY MASS INDEX: 30.99 KG/M2 | SYSTOLIC BLOOD PRESSURE: 113 MMHG

## 2022-03-07 DIAGNOSIS — Z01.818 PREOPERATIVE CLEARANCE: ICD-10-CM

## 2022-03-07 DIAGNOSIS — R06.02 SHORT OF BREATH ON EXERTION: ICD-10-CM

## 2022-03-07 DIAGNOSIS — Z87.891 HISTORY OF TOBACCO USE: ICD-10-CM

## 2022-03-07 DIAGNOSIS — I10 BENIGN ESSENTIAL HTN: ICD-10-CM

## 2022-03-07 LAB
ECHO AO ASC DIAM: 2.9 CM
ECHO AO ASCENDING AORTA INDEX: 1.52 CM/M2
ECHO AO ROOT DIAM: 2.8 CM
ECHO AO ROOT INDEX: 1.47 CM/M2
ECHO AV PEAK GRADIENT: 11 MMHG
ECHO AV PEAK VELOCITY: 1.7 M/S
ECHO AV VELOCITY RATIO: 0.59
ECHO LA DIAMETER INDEX: 1.83 CM/M2
ECHO LA DIAMETER: 3.5 CM
ECHO LA TO AORTIC ROOT RATIO: 1.25
ECHO LA VOL 2C: 41 ML (ref 22–52)
ECHO LA VOL 4C: 42 ML (ref 22–52)
ECHO LA VOL BP: 43 ML (ref 22–52)
ECHO LA VOL/BSA BIPLANE: 23 ML/M2 (ref 16–34)
ECHO LA VOLUME AREA LENGTH: 44 ML
ECHO LA VOLUME INDEX A2C: 21 ML/M2 (ref 16–34)
ECHO LA VOLUME INDEX A4C: 22 ML/M2 (ref 16–34)
ECHO LA VOLUME INDEX AREA LENGTH: 23 ML/M2 (ref 16–34)
ECHO LV E' LATERAL VELOCITY: 8 CM/S
ECHO LV E' SEPTAL VELOCITY: 7 CM/S
ECHO LV FRACTIONAL SHORTENING: 33 % (ref 28–44)
ECHO LV INTERNAL DIMENSION DIASTOLE INDEX: 1.88 CM/M2
ECHO LV INTERNAL DIMENSION DIASTOLIC: 3.6 CM (ref 3.9–5.3)
ECHO LV INTERNAL DIMENSION SYSTOLIC INDEX: 1.26 CM/M2
ECHO LV INTERNAL DIMENSION SYSTOLIC: 2.4 CM
ECHO LV ISOVOLUMETRIC RELAXATION TIME (IVRT): 62.8 MS
ECHO LV IVSD: 1.1 CM (ref 0.6–0.9)
ECHO LV MASS 2D: 132.7 G (ref 67–162)
ECHO LV MASS INDEX 2D: 69.5 G/M2 (ref 43–95)
ECHO LV POSTERIOR WALL DIASTOLIC: 1.2 CM (ref 0.6–0.9)
ECHO LV RELATIVE WALL THICKNESS RATIO: 0.67
ECHO LVOT PEAK GRADIENT: 4 MMHG
ECHO LVOT PEAK VELOCITY: 1 M/S
ECHO MV "A" WAVE DURATION: 157.9 MSEC
ECHO MV A VELOCITY: 0.88 M/S
ECHO MV AREA PHT: 2.3 CM2
ECHO MV E DECELERATION TIME (DT): 335.4 MS
ECHO MV E VELOCITY: 0.62 M/S
ECHO MV E/A RATIO: 0.7
ECHO MV E/E' LATERAL: 7.75
ECHO MV E/E' RATIO (AVERAGED): 8.3
ECHO MV E/E' SEPTAL: 8.86
ECHO MV PRESSURE HALF TIME (PHT): 97.3 MS
ECHO RV FREE WALL PEAK S': 14 CM/S
ECHO RV TAPSE: 2 CM (ref 1.5–2)

## 2022-03-07 PROCEDURE — 93306 TTE W/DOPPLER COMPLETE: CPT | Performed by: INTERNAL MEDICINE

## 2022-03-07 NOTE — PROGRESS NOTES
MD Aidan Babcock, RN  Please let pt know echo was normal. If you can please also send a clearance letter to her PCP and surgeon today. She is low risk and can proceed.  thx

## 2022-03-08 ENCOUNTER — HOSPITAL ENCOUNTER (INPATIENT)
Age: 77
LOS: 3 days | Discharge: NURSING, REHAB OR LONG-TERM CARE | DRG: 455 | End: 2022-03-11
Attending: ORTHOPAEDIC SURGERY | Admitting: ORTHOPAEDIC SURGERY
Payer: MEDICARE

## 2022-03-08 ENCOUNTER — ANESTHESIA (OUTPATIENT)
Dept: SURGERY | Age: 77
DRG: 455 | End: 2022-03-08
Payer: MEDICARE

## 2022-03-08 ENCOUNTER — APPOINTMENT (OUTPATIENT)
Dept: GENERAL RADIOLOGY | Age: 77
DRG: 455 | End: 2022-03-08
Attending: ORTHOPAEDIC SURGERY
Payer: MEDICARE

## 2022-03-08 DIAGNOSIS — Z98.1 S/P LUMBAR FUSION: Primary | ICD-10-CM

## 2022-03-08 PROBLEM — Z98.890 STATUS POST SURGERY: Status: ACTIVE | Noted: 2022-03-08

## 2022-03-08 LAB
GLUCOSE BLD STRIP.AUTO-MCNC: 93 MG/DL (ref 65–117)
SERVICE CMNT-IMP: NORMAL

## 2022-03-08 PROCEDURE — 77030031284: Performed by: ORTHOPAEDIC SURGERY

## 2022-03-08 PROCEDURE — C1713 ANCHOR/SCREW BN/BN,TIS/BN: HCPCS | Performed by: ORTHOPAEDIC SURGERY

## 2022-03-08 PROCEDURE — 74011250637 HC RX REV CODE- 250/637: Performed by: ANESTHESIOLOGY

## 2022-03-08 PROCEDURE — 77030020268 HC MISC GENERAL SUPPLY: Performed by: ORTHOPAEDIC SURGERY

## 2022-03-08 PROCEDURE — 2709999900 HC NON-CHARGEABLE SUPPLY: Performed by: ORTHOPAEDIC SURGERY

## 2022-03-08 PROCEDURE — G0378 HOSPITAL OBSERVATION PER HR: HCPCS

## 2022-03-08 PROCEDURE — 77030035455: Performed by: ORTHOPAEDIC SURGERY

## 2022-03-08 PROCEDURE — 74011250636 HC RX REV CODE- 250/636: Performed by: ANESTHESIOLOGY

## 2022-03-08 PROCEDURE — 74011250636 HC RX REV CODE- 250/636: Performed by: NURSE ANESTHETIST, CERTIFIED REGISTERED

## 2022-03-08 PROCEDURE — 72100 X-RAY EXAM L-S SPINE 2/3 VWS: CPT

## 2022-03-08 PROCEDURE — 74011000250 HC RX REV CODE- 250: Performed by: NURSE ANESTHETIST, CERTIFIED REGISTERED

## 2022-03-08 PROCEDURE — 0ST20ZZ RESECTION OF LUMBAR VERTEBRAL DISC, OPEN APPROACH: ICD-10-PCS | Performed by: ORTHOPAEDIC SURGERY

## 2022-03-08 PROCEDURE — 74011000250 HC RX REV CODE- 250: Performed by: ORTHOPAEDIC SURGERY

## 2022-03-08 PROCEDURE — 77030034475 HC MISC IMPL SPN: Performed by: ORTHOPAEDIC SURGERY

## 2022-03-08 PROCEDURE — 76060000038 HC ANESTHESIA 3.5 TO 4 HR: Performed by: ORTHOPAEDIC SURGERY

## 2022-03-08 PROCEDURE — 74011250636 HC RX REV CODE- 250/636: Performed by: PHYSICIAN ASSISTANT

## 2022-03-08 PROCEDURE — 76210000017 HC OR PH I REC 1.5 TO 2 HR: Performed by: ORTHOPAEDIC SURGERY

## 2022-03-08 PROCEDURE — 77030008684 HC TU ET CUF COVD -B: Performed by: ANESTHESIOLOGY

## 2022-03-08 PROCEDURE — 77030041279 HC DRSG PRMSL AG MDII -B: Performed by: ORTHOPAEDIC SURGERY

## 2022-03-08 PROCEDURE — 77030014650 HC SEAL MTRX FLOSEL BAXT -C: Performed by: ORTHOPAEDIC SURGERY

## 2022-03-08 PROCEDURE — 74011000272 HC RX REV CODE- 272: Performed by: ORTHOPAEDIC SURGERY

## 2022-03-08 PROCEDURE — 77030003666 HC NDL SPINAL BD -A: Performed by: ORTHOPAEDIC SURGERY

## 2022-03-08 PROCEDURE — 77030040922 HC BLNKT HYPOTHRM STRY -A

## 2022-03-08 PROCEDURE — 74011000250 HC RX REV CODE- 250: Performed by: PHYSICIAN ASSISTANT

## 2022-03-08 PROCEDURE — 77030031300: Performed by: ORTHOPAEDIC SURGERY

## 2022-03-08 PROCEDURE — 82962 GLUCOSE BLOOD TEST: CPT

## 2022-03-08 PROCEDURE — 76010000174 HC OR TIME 3.5 TO 4 HR INTENSV-TIER 1: Performed by: ORTHOPAEDIC SURGERY

## 2022-03-08 PROCEDURE — C1762 CONN TISS, HUMAN(INC FASCIA): HCPCS | Performed by: ORTHOPAEDIC SURGERY

## 2022-03-08 PROCEDURE — 0SG00A0 FUSION OF LUMBAR VERTEBRAL JOINT WITH INTERBODY FUSION DEVICE, ANTERIOR APPROACH, ANTERIOR COLUMN, OPEN APPROACH: ICD-10-PCS | Performed by: ORTHOPAEDIC SURGERY

## 2022-03-08 PROCEDURE — 65270000029 HC RM PRIVATE

## 2022-03-08 PROCEDURE — 77030026438 HC STYL ET INTUB CARD -A: Performed by: ANESTHESIOLOGY

## 2022-03-08 PROCEDURE — 0SG30A0 FUSION OF LUMBOSACRAL JOINT WITH INTERBODY FUSION DEVICE, ANTERIOR APPROACH, ANTERIOR COLUMN, OPEN APPROACH: ICD-10-PCS | Performed by: ORTHOPAEDIC SURGERY

## 2022-03-08 PROCEDURE — 77030031139 HC SUT VCRL2 J&J -A: Performed by: ORTHOPAEDIC SURGERY

## 2022-03-08 PROCEDURE — 77030011267 HC ELECTRD BLD COVD -A: Performed by: ORTHOPAEDIC SURGERY

## 2022-03-08 PROCEDURE — 77030002933 HC SUT MCRYL J&J -A: Performed by: ORTHOPAEDIC SURGERY

## 2022-03-08 PROCEDURE — 74011250637 HC RX REV CODE- 250/637: Performed by: PHYSICIAN ASSISTANT

## 2022-03-08 PROCEDURE — 0SG3071 FUSION OF LUMBOSACRAL JOINT WITH AUTOLOGOUS TISSUE SUBSTITUTE, POSTERIOR APPROACH, POSTERIOR COLUMN, OPEN APPROACH: ICD-10-PCS | Performed by: ORTHOPAEDIC SURGERY

## 2022-03-08 PROCEDURE — 74011000258 HC RX REV CODE- 258: Performed by: NURSE ANESTHETIST, CERTIFIED REGISTERED

## 2022-03-08 PROCEDURE — 0SG0071 FUSION OF LUMBAR VERTEBRAL JOINT WITH AUTOLOGOUS TISSUE SUBSTITUTE, POSTERIOR APPROACH, POSTERIOR COLUMN, OPEN APPROACH: ICD-10-PCS | Performed by: ORTHOPAEDIC SURGERY

## 2022-03-08 PROCEDURE — 77030020269 HC MISC IMPL: Performed by: ORTHOPAEDIC SURGERY

## 2022-03-08 PROCEDURE — 77030029099 HC BN WAX SSPC -A: Performed by: ORTHOPAEDIC SURGERY

## 2022-03-08 PROCEDURE — 77030010507 HC ADH SKN DERMBND J&J -B: Performed by: ORTHOPAEDIC SURGERY

## 2022-03-08 PROCEDURE — 0ST40ZZ RESECTION OF LUMBOSACRAL DISC, OPEN APPROACH: ICD-10-PCS | Performed by: ORTHOPAEDIC SURGERY

## 2022-03-08 PROCEDURE — 77030005513 HC CATH URETH FOL11 MDII -B: Performed by: ORTHOPAEDIC SURGERY

## 2022-03-08 DEVICE — I-FACTOR™ PUTTY, 5.0 CC SYRINGE
Type: IMPLANTABLE DEVICE | Site: SPINE LUMBAR | Status: FUNCTIONAL
Brand: I-FACTOR™ PEPTIDE ENHANCED BONE GRAFT

## 2022-03-08 DEVICE — OMEGA XP™ 9MM X 28MM X 11MM 4°  	LUMBAR EXPANDABLE INTERBODY
Type: IMPLANTABLE DEVICE | Site: SPINE LUMBAR | Status: FUNCTIONAL
Brand: OMEGA XP

## 2022-03-08 DEVICE — SET SCR POLYAX: Type: IMPLANTABLE DEVICE | Site: SPINE LUMBAR | Status: FUNCTIONAL

## 2022-03-08 DEVICE — IMPLANTABLE DEVICE: Type: IMPLANTABLE DEVICE | Site: SPINE LUMBAR | Status: FUNCTIONAL

## 2022-03-08 DEVICE — ALLOGRAFT BNE MOLD 5 CC VIABLE BNE MTRX VIBONE: Type: IMPLANTABLE DEVICE | Site: SPINE LUMBAR | Status: FUNCTIONAL

## 2022-03-08 RX ORDER — NALOXONE HYDROCHLORIDE 0.4 MG/ML
0.4 INJECTION, SOLUTION INTRAMUSCULAR; INTRAVENOUS; SUBCUTANEOUS AS NEEDED
Status: DISCONTINUED | OUTPATIENT
Start: 2022-03-08 | End: 2022-03-11 | Stop reason: HOSPADM

## 2022-03-08 RX ORDER — SODIUM CHLORIDE 0.9 % (FLUSH) 0.9 %
5-40 SYRINGE (ML) INJECTION AS NEEDED
Status: DISCONTINUED | OUTPATIENT
Start: 2022-03-08 | End: 2022-03-11 | Stop reason: HOSPADM

## 2022-03-08 RX ORDER — SODIUM CHLORIDE 0.9 % (FLUSH) 0.9 %
5-40 SYRINGE (ML) INJECTION AS NEEDED
Status: DISCONTINUED | OUTPATIENT
Start: 2022-03-08 | End: 2022-03-08 | Stop reason: HOSPADM

## 2022-03-08 RX ORDER — FENTANYL CITRATE 50 UG/ML
50 INJECTION, SOLUTION INTRAMUSCULAR; INTRAVENOUS AS NEEDED
Status: DISCONTINUED | OUTPATIENT
Start: 2022-03-08 | End: 2022-03-08 | Stop reason: HOSPADM

## 2022-03-08 RX ORDER — PROPOFOL 10 MG/ML
INJECTION, EMULSION INTRAVENOUS AS NEEDED
Status: DISCONTINUED | OUTPATIENT
Start: 2022-03-08 | End: 2022-03-08 | Stop reason: HOSPADM

## 2022-03-08 RX ORDER — MORPHINE SULFATE 2 MG/ML
2 INJECTION, SOLUTION INTRAMUSCULAR; INTRAVENOUS
Status: DISCONTINUED | OUTPATIENT
Start: 2022-03-08 | End: 2022-03-08 | Stop reason: HOSPADM

## 2022-03-08 RX ORDER — SODIUM CHLORIDE 0.9 % (FLUSH) 0.9 %
5-40 SYRINGE (ML) INJECTION EVERY 8 HOURS
Status: DISCONTINUED | OUTPATIENT
Start: 2022-03-08 | End: 2022-03-08 | Stop reason: HOSPADM

## 2022-03-08 RX ORDER — ONDANSETRON 4 MG/1
4 TABLET, ORALLY DISINTEGRATING ORAL
Status: DISCONTINUED | OUTPATIENT
Start: 2022-03-08 | End: 2022-03-11 | Stop reason: HOSPADM

## 2022-03-08 RX ORDER — ONDANSETRON 2 MG/ML
INJECTION INTRAMUSCULAR; INTRAVENOUS AS NEEDED
Status: DISCONTINUED | OUTPATIENT
Start: 2022-03-08 | End: 2022-03-08 | Stop reason: HOSPADM

## 2022-03-08 RX ORDER — OXYCODONE HYDROCHLORIDE 5 MG/1
5 TABLET ORAL
Status: DISCONTINUED | OUTPATIENT
Start: 2022-03-08 | End: 2022-03-11 | Stop reason: HOSPADM

## 2022-03-08 RX ORDER — AMITRIPTYLINE HYDROCHLORIDE 50 MG/1
75 TABLET, FILM COATED ORAL
Status: DISCONTINUED | OUTPATIENT
Start: 2022-03-08 | End: 2022-03-11 | Stop reason: HOSPADM

## 2022-03-08 RX ORDER — FENTANYL CITRATE 50 UG/ML
INJECTION, SOLUTION INTRAMUSCULAR; INTRAVENOUS AS NEEDED
Status: DISCONTINUED | OUTPATIENT
Start: 2022-03-08 | End: 2022-03-08 | Stop reason: HOSPADM

## 2022-03-08 RX ORDER — HYDROMORPHONE HYDROCHLORIDE 1 MG/ML
0.5 INJECTION, SOLUTION INTRAMUSCULAR; INTRAVENOUS; SUBCUTANEOUS
Status: ACTIVE | OUTPATIENT
Start: 2022-03-08 | End: 2022-03-09

## 2022-03-08 RX ORDER — GABAPENTIN 300 MG/1
300 CAPSULE ORAL 2 TIMES DAILY
Status: DISCONTINUED | OUTPATIENT
Start: 2022-03-08 | End: 2022-03-11 | Stop reason: HOSPADM

## 2022-03-08 RX ORDER — LISINOPRIL 5 MG/1
5 TABLET ORAL EVERY MORNING
Status: DISCONTINUED | OUTPATIENT
Start: 2022-03-09 | End: 2022-03-11 | Stop reason: HOSPADM

## 2022-03-08 RX ORDER — FACIAL-BODY WIPES
10 EACH TOPICAL DAILY PRN
Status: DISCONTINUED | OUTPATIENT
Start: 2022-03-10 | End: 2022-03-11 | Stop reason: HOSPADM

## 2022-03-08 RX ORDER — PHENYLEPHRINE HCL IN 0.9% NACL 0.4MG/10ML
SYRINGE (ML) INTRAVENOUS AS NEEDED
Status: DISCONTINUED | OUTPATIENT
Start: 2022-03-08 | End: 2022-03-08 | Stop reason: HOSPADM

## 2022-03-08 RX ORDER — HYDROMORPHONE HYDROCHLORIDE 1 MG/ML
0.2 INJECTION, SOLUTION INTRAMUSCULAR; INTRAVENOUS; SUBCUTANEOUS
Status: COMPLETED | OUTPATIENT
Start: 2022-03-08 | End: 2022-03-08

## 2022-03-08 RX ORDER — DEXAMETHASONE SODIUM PHOSPHATE 4 MG/ML
INJECTION, SOLUTION INTRA-ARTICULAR; INTRALESIONAL; INTRAMUSCULAR; INTRAVENOUS; SOFT TISSUE AS NEEDED
Status: DISCONTINUED | OUTPATIENT
Start: 2022-03-08 | End: 2022-03-08 | Stop reason: HOSPADM

## 2022-03-08 RX ORDER — CEFAZOLIN SODIUM 1 G/3ML
INJECTION, POWDER, FOR SOLUTION INTRAMUSCULAR; INTRAVENOUS AS NEEDED
Status: DISCONTINUED | OUTPATIENT
Start: 2022-03-08 | End: 2022-03-08 | Stop reason: HOSPADM

## 2022-03-08 RX ORDER — KETOROLAC TROMETHAMINE 30 MG/ML
15 INJECTION, SOLUTION INTRAMUSCULAR; INTRAVENOUS EVERY 6 HOURS
Status: COMPLETED | OUTPATIENT
Start: 2022-03-08 | End: 2022-03-09

## 2022-03-08 RX ORDER — PANTOPRAZOLE SODIUM 40 MG/1
40 TABLET, DELAYED RELEASE ORAL
Status: DISCONTINUED | OUTPATIENT
Start: 2022-03-09 | End: 2022-03-11 | Stop reason: HOSPADM

## 2022-03-08 RX ORDER — ATORVASTATIN CALCIUM 10 MG/1
20 TABLET, FILM COATED ORAL DAILY
Status: DISCONTINUED | OUTPATIENT
Start: 2022-03-09 | End: 2022-03-11 | Stop reason: HOSPADM

## 2022-03-08 RX ORDER — SODIUM CHLORIDE 0.9 % (FLUSH) 0.9 %
5-40 SYRINGE (ML) INJECTION EVERY 8 HOURS
Status: DISCONTINUED | OUTPATIENT
Start: 2022-03-08 | End: 2022-03-11 | Stop reason: HOSPADM

## 2022-03-08 RX ORDER — AMOXICILLIN 250 MG
1 CAPSULE ORAL 2 TIMES DAILY
Status: DISCONTINUED | OUTPATIENT
Start: 2022-03-08 | End: 2022-03-11 | Stop reason: HOSPADM

## 2022-03-08 RX ORDER — DIAZEPAM 5 MG/1
5 TABLET ORAL
Status: DISCONTINUED | OUTPATIENT
Start: 2022-03-08 | End: 2022-03-11 | Stop reason: HOSPADM

## 2022-03-08 RX ORDER — SODIUM CHLORIDE, SODIUM LACTATE, POTASSIUM CHLORIDE, CALCIUM CHLORIDE 600; 310; 30; 20 MG/100ML; MG/100ML; MG/100ML; MG/100ML
125 INJECTION, SOLUTION INTRAVENOUS CONTINUOUS
Status: DISCONTINUED | OUTPATIENT
Start: 2022-03-08 | End: 2022-03-08 | Stop reason: HOSPADM

## 2022-03-08 RX ORDER — OXYCODONE HYDROCHLORIDE 5 MG/1
10 TABLET ORAL
Status: DISCONTINUED | OUTPATIENT
Start: 2022-03-08 | End: 2022-03-11 | Stop reason: HOSPADM

## 2022-03-08 RX ORDER — ACETAMINOPHEN 500 MG
1000 TABLET ORAL EVERY 6 HOURS
Status: DISCONTINUED | OUTPATIENT
Start: 2022-03-08 | End: 2022-03-11 | Stop reason: HOSPADM

## 2022-03-08 RX ORDER — EPHEDRINE SULFATE/0.9% NACL/PF 50 MG/5 ML
SYRINGE (ML) INTRAVENOUS AS NEEDED
Status: DISCONTINUED | OUTPATIENT
Start: 2022-03-08 | End: 2022-03-08 | Stop reason: HOSPADM

## 2022-03-08 RX ORDER — ONDANSETRON 2 MG/ML
4 INJECTION INTRAMUSCULAR; INTRAVENOUS AS NEEDED
Status: DISCONTINUED | OUTPATIENT
Start: 2022-03-08 | End: 2022-03-08 | Stop reason: HOSPADM

## 2022-03-08 RX ORDER — ROCURONIUM BROMIDE 10 MG/ML
INJECTION, SOLUTION INTRAVENOUS AS NEEDED
Status: DISCONTINUED | OUTPATIENT
Start: 2022-03-08 | End: 2022-03-08 | Stop reason: HOSPADM

## 2022-03-08 RX ORDER — BUPROPION HYDROCHLORIDE 150 MG/1
150 TABLET, EXTENDED RELEASE ORAL
Status: DISCONTINUED | OUTPATIENT
Start: 2022-03-08 | End: 2022-03-11 | Stop reason: HOSPADM

## 2022-03-08 RX ORDER — SODIUM CHLORIDE 9 MG/ML
25 INJECTION, SOLUTION INTRAVENOUS CONTINUOUS
Status: DISCONTINUED | OUTPATIENT
Start: 2022-03-08 | End: 2022-03-08 | Stop reason: HOSPADM

## 2022-03-08 RX ORDER — LIDOCAINE HYDROCHLORIDE 20 MG/ML
INJECTION, SOLUTION EPIDURAL; INFILTRATION; INTRACAUDAL; PERINEURAL AS NEEDED
Status: DISCONTINUED | OUTPATIENT
Start: 2022-03-08 | End: 2022-03-08 | Stop reason: HOSPADM

## 2022-03-08 RX ORDER — MIDAZOLAM HYDROCHLORIDE 1 MG/ML
1 INJECTION, SOLUTION INTRAMUSCULAR; INTRAVENOUS AS NEEDED
Status: DISCONTINUED | OUTPATIENT
Start: 2022-03-08 | End: 2022-03-08 | Stop reason: HOSPADM

## 2022-03-08 RX ORDER — SODIUM CHLORIDE 9 MG/ML
125 INJECTION, SOLUTION INTRAVENOUS CONTINUOUS
Status: DISPENSED | OUTPATIENT
Start: 2022-03-08 | End: 2022-03-09

## 2022-03-08 RX ORDER — MIDAZOLAM HYDROCHLORIDE 1 MG/ML
0.5 INJECTION, SOLUTION INTRAMUSCULAR; INTRAVENOUS
Status: DISCONTINUED | OUTPATIENT
Start: 2022-03-08 | End: 2022-03-08 | Stop reason: HOSPADM

## 2022-03-08 RX ORDER — HYDROMORPHONE HYDROCHLORIDE 2 MG/ML
INJECTION, SOLUTION INTRAMUSCULAR; INTRAVENOUS; SUBCUTANEOUS AS NEEDED
Status: DISCONTINUED | OUTPATIENT
Start: 2022-03-08 | End: 2022-03-08 | Stop reason: HOSPADM

## 2022-03-08 RX ORDER — OXYCODONE AND ACETAMINOPHEN 5; 325 MG/1; MG/1
1 TABLET ORAL AS NEEDED
Status: DISCONTINUED | OUTPATIENT
Start: 2022-03-08 | End: 2022-03-08 | Stop reason: HOSPADM

## 2022-03-08 RX ORDER — LIDOCAINE HYDROCHLORIDE 10 MG/ML
0.1 INJECTION, SOLUTION EPIDURAL; INFILTRATION; INTRACAUDAL; PERINEURAL AS NEEDED
Status: DISCONTINUED | OUTPATIENT
Start: 2022-03-08 | End: 2022-03-08 | Stop reason: HOSPADM

## 2022-03-08 RX ORDER — SUCCINYLCHOLINE CHLORIDE 20 MG/ML
INJECTION INTRAMUSCULAR; INTRAVENOUS AS NEEDED
Status: DISCONTINUED | OUTPATIENT
Start: 2022-03-08 | End: 2022-03-08 | Stop reason: HOSPADM

## 2022-03-08 RX ORDER — ACETAMINOPHEN 325 MG/1
650 TABLET ORAL ONCE
Status: COMPLETED | OUTPATIENT
Start: 2022-03-08 | End: 2022-03-08

## 2022-03-08 RX ORDER — ONDANSETRON 2 MG/ML
4 INJECTION INTRAMUSCULAR; INTRAVENOUS
Status: ACTIVE | OUTPATIENT
Start: 2022-03-08 | End: 2022-03-09

## 2022-03-08 RX ORDER — DIPHENHYDRAMINE HYDROCHLORIDE 50 MG/ML
12.5 INJECTION, SOLUTION INTRAMUSCULAR; INTRAVENOUS AS NEEDED
Status: DISCONTINUED | OUTPATIENT
Start: 2022-03-08 | End: 2022-03-08 | Stop reason: HOSPADM

## 2022-03-08 RX ORDER — POLYETHYLENE GLYCOL 3350 17 G/17G
17 POWDER, FOR SOLUTION ORAL DAILY
Status: DISCONTINUED | OUTPATIENT
Start: 2022-03-09 | End: 2022-03-11 | Stop reason: HOSPADM

## 2022-03-08 RX ORDER — FENTANYL CITRATE 50 UG/ML
25 INJECTION, SOLUTION INTRAMUSCULAR; INTRAVENOUS
Status: COMPLETED | OUTPATIENT
Start: 2022-03-08 | End: 2022-03-08

## 2022-03-08 RX ORDER — DIPHENHYDRAMINE HCL 12.5MG/5ML
12.5 ELIXIR ORAL
Status: ACTIVE | OUTPATIENT
Start: 2022-03-08 | End: 2022-03-09

## 2022-03-08 RX ADMIN — HYDROMORPHONE HYDROCHLORIDE 0.2 MG: 1 INJECTION, SOLUTION INTRAMUSCULAR; INTRAVENOUS; SUBCUTANEOUS at 13:05

## 2022-03-08 RX ADMIN — HYDROMORPHONE HYDROCHLORIDE 0.2 MG: 1 INJECTION, SOLUTION INTRAMUSCULAR; INTRAVENOUS; SUBCUTANEOUS at 13:35

## 2022-03-08 RX ADMIN — DEXAMETHASONE SODIUM PHOSPHATE 4 MG: 4 INJECTION, SOLUTION INTRAMUSCULAR; INTRAVENOUS at 09:51

## 2022-03-08 RX ADMIN — Medication 10 MG: at 10:20

## 2022-03-08 RX ADMIN — KETOROLAC TROMETHAMINE 15 MG: 30 INJECTION, SOLUTION INTRAMUSCULAR; INTRAVENOUS at 17:22

## 2022-03-08 RX ADMIN — SODIUM CHLORIDE 125 ML/HR: 9 INJECTION, SOLUTION INTRAVENOUS at 20:42

## 2022-03-08 RX ADMIN — ONDANSETRON HYDROCHLORIDE 4 MG: 2 SOLUTION INTRAMUSCULAR; INTRAVENOUS at 13:12

## 2022-03-08 RX ADMIN — PROPOFOL 25 MG: 10 INJECTION, EMULSION INTRAVENOUS at 09:09

## 2022-03-08 RX ADMIN — SUCCINYLCHOLINE CHLORIDE 40 MG: 20 INJECTION, SOLUTION INTRAMUSCULAR; INTRAVENOUS at 09:15

## 2022-03-08 RX ADMIN — HYDROMORPHONE HYDROCHLORIDE 0.2 MG: 1 INJECTION, SOLUTION INTRAMUSCULAR; INTRAVENOUS; SUBCUTANEOUS at 13:50

## 2022-03-08 RX ADMIN — Medication 51 MG/HR: at 09:08

## 2022-03-08 RX ADMIN — HYDROMORPHONE HYDROCHLORIDE 0.2 MG: 1 INJECTION, SOLUTION INTRAMUSCULAR; INTRAVENOUS; SUBCUTANEOUS at 14:05

## 2022-03-08 RX ADMIN — Medication 50 MCG/MIN: at 10:35

## 2022-03-08 RX ADMIN — OXYCODONE 10 MG: 5 TABLET ORAL at 15:42

## 2022-03-08 RX ADMIN — PROPOFOL 125 MG: 10 INJECTION, EMULSION INTRAVENOUS at 09:04

## 2022-03-08 RX ADMIN — Medication 80 MCG: at 10:32

## 2022-03-08 RX ADMIN — WATER 2 G: 1 INJECTION INTRAMUSCULAR; INTRAVENOUS; SUBCUTANEOUS at 15:42

## 2022-03-08 RX ADMIN — FENTANYL CITRATE 25 MCG: 50 INJECTION, SOLUTION INTRAMUSCULAR; INTRAVENOUS at 13:00

## 2022-03-08 RX ADMIN — FENTANYL CITRATE 25 MCG: 50 INJECTION, SOLUTION INTRAMUSCULAR; INTRAVENOUS at 12:45

## 2022-03-08 RX ADMIN — HYDROMORPHONE HYDROCHLORIDE 0.5 MG: 2 INJECTION, SOLUTION INTRAMUSCULAR; INTRAVENOUS; SUBCUTANEOUS at 12:18

## 2022-03-08 RX ADMIN — PROPOFOL 50 MG: 10 INJECTION, EMULSION INTRAVENOUS at 09:06

## 2022-03-08 RX ADMIN — FENTANYL CITRATE 100 MCG: 50 INJECTION, SOLUTION INTRAMUSCULAR; INTRAVENOUS at 12:46

## 2022-03-08 RX ADMIN — BUPROPION HYDROCHLORIDE 150 MG: 150 TABLET, EXTENDED RELEASE ORAL at 22:49

## 2022-03-08 RX ADMIN — SODIUM CHLORIDE, POTASSIUM CHLORIDE, SODIUM LACTATE AND CALCIUM CHLORIDE 125 ML/HR: 600; 310; 30; 20 INJECTION, SOLUTION INTRAVENOUS at 07:18

## 2022-03-08 RX ADMIN — MIDAZOLAM 0.5 MG: 1 INJECTION INTRAMUSCULAR; INTRAVENOUS at 13:30

## 2022-03-08 RX ADMIN — Medication 120 MCG: at 10:28

## 2022-03-08 RX ADMIN — SENNOSIDES AND DOCUSATE SODIUM 1 TABLET: 50; 8.6 TABLET ORAL at 17:23

## 2022-03-08 RX ADMIN — ONDANSETRON HYDROCHLORIDE 4 MG: 2 INJECTION, SOLUTION INTRAMUSCULAR; INTRAVENOUS at 09:51

## 2022-03-08 RX ADMIN — MIDAZOLAM 0.5 MG: 1 INJECTION INTRAMUSCULAR; INTRAVENOUS at 13:35

## 2022-03-08 RX ADMIN — WATER 2 G: 1 INJECTION INTRAMUSCULAR; INTRAVENOUS; SUBCUTANEOUS at 22:49

## 2022-03-08 RX ADMIN — ACETAMINOPHEN 1000 MG: 500 TABLET ORAL at 17:22

## 2022-03-08 RX ADMIN — PROPOFOL 150 MCG/KG/MIN: 10 INJECTION, EMULSION INTRAVENOUS at 10:07

## 2022-03-08 RX ADMIN — SODIUM CHLORIDE, PRESERVATIVE FREE 10 ML: 5 INJECTION INTRAVENOUS at 22:50

## 2022-03-08 RX ADMIN — GABAPENTIN 300 MG: 300 CAPSULE ORAL at 17:23

## 2022-03-08 RX ADMIN — SODIUM CHLORIDE 125 ML/HR: 9 INJECTION, SOLUTION INTRAVENOUS at 13:26

## 2022-03-08 RX ADMIN — FENTANYL CITRATE 25 MCG: 50 INJECTION, SOLUTION INTRAMUSCULAR; INTRAVENOUS at 12:50

## 2022-03-08 RX ADMIN — PROPOFOL 150 MCG/KG/MIN: 10 INJECTION, EMULSION INTRAVENOUS at 09:08

## 2022-03-08 RX ADMIN — HYDROMORPHONE HYDROCHLORIDE 0.5 MG: 2 INJECTION, SOLUTION INTRAMUSCULAR; INTRAVENOUS; SUBCUTANEOUS at 12:08

## 2022-03-08 RX ADMIN — CEFAZOLIN 2 G: 330 INJECTION, POWDER, FOR SOLUTION INTRAMUSCULAR; INTRAVENOUS at 09:40

## 2022-03-08 RX ADMIN — AMITRIPTYLINE HYDROCHLORIDE 75 MG: 50 TABLET, FILM COATED ORAL at 22:49

## 2022-03-08 RX ADMIN — ROCURONIUM BROMIDE 10 MG: 10 SOLUTION INTRAVENOUS at 09:04

## 2022-03-08 RX ADMIN — LIDOCAINE HYDROCHLORIDE 80 MG: 20 INJECTION, SOLUTION EPIDURAL; INFILTRATION; INTRACAUDAL; PERINEURAL at 09:04

## 2022-03-08 RX ADMIN — PROPOFOL 25 MG: 10 INJECTION, EMULSION INTRAVENOUS at 09:14

## 2022-03-08 RX ADMIN — HYDROMORPHONE HYDROCHLORIDE 0.2 MG: 1 INJECTION, SOLUTION INTRAMUSCULAR; INTRAVENOUS; SUBCUTANEOUS at 13:20

## 2022-03-08 RX ADMIN — Medication 10 MG: at 10:24

## 2022-03-08 RX ADMIN — SUCCINYLCHOLINE CHLORIDE 160 MG: 20 INJECTION, SOLUTION INTRAMUSCULAR; INTRAVENOUS at 09:04

## 2022-03-08 RX ADMIN — FENTANYL CITRATE 25 MCG: 50 INJECTION, SOLUTION INTRAMUSCULAR; INTRAVENOUS at 12:55

## 2022-03-08 RX ADMIN — FENTANYL CITRATE 50 MCG: 50 INJECTION, SOLUTION INTRAMUSCULAR; INTRAVENOUS at 09:04

## 2022-03-08 RX ADMIN — Medication 80 MCG: at 10:24

## 2022-03-08 RX ADMIN — PROPOFOL 25 MG: 10 INJECTION, EMULSION INTRAVENOUS at 09:12

## 2022-03-08 RX ADMIN — PROPOFOL 50 MCG/KG/MIN: 10 INJECTION, EMULSION INTRAVENOUS at 12:00

## 2022-03-08 RX ADMIN — ACETAMINOPHEN 650 MG: 325 TABLET ORAL at 07:21

## 2022-03-08 RX ADMIN — FENTANYL CITRATE 100 MCG: 50 INJECTION, SOLUTION INTRAMUSCULAR; INTRAVENOUS at 09:28

## 2022-03-08 NOTE — PROGRESS NOTES
Primary Nurse Laurice Angelucci, RN and Jenette Phalen, RN performed a dual skin assessment on this patient No impairment noted  Greg score is 20

## 2022-03-08 NOTE — PROGRESS NOTES
Occupational Therapy  03/08/22    Orders received, chart review completed. Note patient POD #0 s/p L4-S1 lateral fusion. OT will follow up tomorrow for evaluation. Recommend OOB to chair three times a day for meals, self-completion of ADLs as able and medically stable.      Thank you,   Baljinder Cantrell, OTD, OTR/L

## 2022-03-08 NOTE — ANESTHESIA PREPROCEDURE EVALUATION
Relevant Problems   No relevant active problems       Anesthetic History   No history of anesthetic complications            Review of Systems / Medical History  Patient summary reviewed, nursing notes reviewed and pertinent labs reviewed    Pulmonary          Smoker         Neuro/Psych         Psychiatric history     Cardiovascular                  Exercise tolerance: >4 METS     GI/Hepatic/Renal     GERD           Endo/Other        Arthritis     Other Findings              Physical Exam    Airway  Mallampati: II  TM Distance: 4 - 6 cm  Neck ROM: normal range of motion   Mouth opening: Normal     Cardiovascular  Regular rate and rhythm,  S1 and S2 normal,  no murmur, click, rub, or gallop             Dental  No notable dental hx       Pulmonary  Breath sounds clear to auscultation               Abdominal  GI exam deferred       Other Findings            Anesthetic Plan    ASA: 2  Anesthesia type: general          Induction: Intravenous  Anesthetic plan and risks discussed with: Patient

## 2022-03-08 NOTE — PROGRESS NOTES
Spine Surgery Progress Note    Admit Date: 3/8/2022   LOS: 1 day      Daily Progress Note: 3/8/2022    POD:Day of Surgery    S/P: Procedure(s):  L4-S1 OBLIQUE LATERAL LUMBAR INTERBODY FUSION    Visit Vitals  /89 (BP 1 Location: Left upper arm, BP Patient Position: At rest)   Pulse (!) 109   Temp 97.5 °F (36.4 °C)   Resp 18   Ht 5' 4.5\" (1.638 m)   Wt 84.8 kg (186 lb 15.2 oz)   SpO2 97%   BMI 31.59 kg/m²      Lab Results   Component Value Date/Time    HGB 11.0 (L) 03/01/2022 11:46 AM    INR 1.0 03/01/2022 11:46 AM       Patient resting in bed. Daughter is at bedside. Patient is uncomfortable. Complaints of moderate to severe pain. Denies nausea, vomiting, fever, chills, chest pain, dyspnea, headache. Voiding status: douglas    Calves soft/NTTP Bilaterally. Moving LE well. Neurocirculatory exam WNL. Motor and sensation intact. Incision OK; no drainage. Dressing clean and dry. Plan:  -Pain control  -PT/OT; in brace starting tomorrow  -Maintain douglas tonight  -Regular diet  -Bowel regimen  -Cont home meds  -Daily dressing changes to incision  -[facility]     Discharge pending. All questions were answered.    Plan d/w Dr. Beatrice Porter, PA

## 2022-03-09 LAB
ANION GAP SERPL CALC-SCNC: 3 MMOL/L (ref 5–15)
BUN SERPL-MCNC: 18 MG/DL (ref 6–20)
BUN/CREAT SERPL: 19 (ref 12–20)
CALCIUM SERPL-MCNC: 8.7 MG/DL (ref 8.5–10.1)
CHLORIDE SERPL-SCNC: 109 MMOL/L (ref 97–108)
CO2 SERPL-SCNC: 27 MMOL/L (ref 21–32)
CREAT SERPL-MCNC: 0.95 MG/DL (ref 0.55–1.02)
GLUCOSE SERPL-MCNC: 118 MG/DL (ref 65–100)
HGB BLD-MCNC: 9.9 G/DL (ref 11.5–16)
POTASSIUM SERPL-SCNC: 4.1 MMOL/L (ref 3.5–5.1)
SODIUM SERPL-SCNC: 139 MMOL/L (ref 136–145)

## 2022-03-09 PROCEDURE — 65270000029 HC RM PRIVATE

## 2022-03-09 PROCEDURE — 94760 N-INVAS EAR/PLS OXIMETRY 1: CPT

## 2022-03-09 PROCEDURE — 74011250636 HC RX REV CODE- 250/636: Performed by: PHYSICIAN ASSISTANT

## 2022-03-09 PROCEDURE — 97116 GAIT TRAINING THERAPY: CPT

## 2022-03-09 PROCEDURE — 97161 PT EVAL LOW COMPLEX 20 MIN: CPT

## 2022-03-09 PROCEDURE — 36415 COLL VENOUS BLD VENIPUNCTURE: CPT

## 2022-03-09 PROCEDURE — 85018 HEMOGLOBIN: CPT

## 2022-03-09 PROCEDURE — 80048 BASIC METABOLIC PNL TOTAL CA: CPT

## 2022-03-09 PROCEDURE — 97530 THERAPEUTIC ACTIVITIES: CPT

## 2022-03-09 PROCEDURE — 74011250637 HC RX REV CODE- 250/637: Performed by: PHYSICIAN ASSISTANT

## 2022-03-09 PROCEDURE — 74011000250 HC RX REV CODE- 250: Performed by: PHYSICIAN ASSISTANT

## 2022-03-09 RX ORDER — AMOXICILLIN 250 MG
1 CAPSULE ORAL 2 TIMES DAILY
Qty: 30 TABLET | Refills: 0 | Status: SHIPPED | OUTPATIENT
Start: 2022-03-09 | End: 2022-10-13

## 2022-03-09 RX ORDER — DEXAMETHASONE SODIUM PHOSPHATE 4 MG/ML
4 INJECTION, SOLUTION INTRA-ARTICULAR; INTRALESIONAL; INTRAMUSCULAR; INTRAVENOUS; SOFT TISSUE EVERY 6 HOURS
Status: COMPLETED | OUTPATIENT
Start: 2022-03-09 | End: 2022-03-10

## 2022-03-09 RX ORDER — OXYCODONE AND ACETAMINOPHEN 5; 325 MG/1; MG/1
1 TABLET ORAL
Qty: 40 TABLET | Refills: 0 | Status: SHIPPED | OUTPATIENT
Start: 2022-03-09 | End: 2022-03-16

## 2022-03-09 RX ADMIN — SENNOSIDES AND DOCUSATE SODIUM 1 TABLET: 50; 8.6 TABLET ORAL at 08:55

## 2022-03-09 RX ADMIN — GABAPENTIN 300 MG: 300 CAPSULE ORAL at 17:11

## 2022-03-09 RX ADMIN — SODIUM CHLORIDE, PRESERVATIVE FREE 10 ML: 5 INJECTION INTRAVENOUS at 14:00

## 2022-03-09 RX ADMIN — PANTOPRAZOLE SODIUM 40 MG: 40 TABLET, DELAYED RELEASE ORAL at 06:18

## 2022-03-09 RX ADMIN — OXYCODONE 5 MG: 5 TABLET ORAL at 12:32

## 2022-03-09 RX ADMIN — OXYCODONE 5 MG: 5 TABLET ORAL at 01:22

## 2022-03-09 RX ADMIN — ATORVASTATIN CALCIUM 20 MG: 10 TABLET, FILM COATED ORAL at 08:55

## 2022-03-09 RX ADMIN — OXYCODONE 5 MG: 5 TABLET ORAL at 04:27

## 2022-03-09 RX ADMIN — DEXAMETHASONE SODIUM PHOSPHATE 4 MG: 4 INJECTION, SOLUTION INTRAMUSCULAR; INTRAVENOUS at 23:49

## 2022-03-09 RX ADMIN — SODIUM CHLORIDE, PRESERVATIVE FREE 10 ML: 5 INJECTION INTRAVENOUS at 06:18

## 2022-03-09 RX ADMIN — ACETAMINOPHEN 1000 MG: 500 TABLET ORAL at 12:27

## 2022-03-09 RX ADMIN — DEXAMETHASONE SODIUM PHOSPHATE 4 MG: 4 INJECTION, SOLUTION INTRAMUSCULAR; INTRAVENOUS at 12:28

## 2022-03-09 RX ADMIN — KETOROLAC TROMETHAMINE 15 MG: 30 INJECTION, SOLUTION INTRAMUSCULAR; INTRAVENOUS at 01:09

## 2022-03-09 RX ADMIN — GABAPENTIN 300 MG: 300 CAPSULE ORAL at 08:55

## 2022-03-09 RX ADMIN — ACETAMINOPHEN 1000 MG: 500 TABLET ORAL at 01:09

## 2022-03-09 RX ADMIN — BUPROPION HYDROCHLORIDE 150 MG: 150 TABLET, EXTENDED RELEASE ORAL at 21:12

## 2022-03-09 RX ADMIN — OXYCODONE 10 MG: 5 TABLET ORAL at 08:55

## 2022-03-09 RX ADMIN — ACETAMINOPHEN 1000 MG: 500 TABLET ORAL at 06:18

## 2022-03-09 RX ADMIN — DEXAMETHASONE SODIUM PHOSPHATE 4 MG: 4 INJECTION, SOLUTION INTRAMUSCULAR; INTRAVENOUS at 17:11

## 2022-03-09 RX ADMIN — ACETAMINOPHEN 1000 MG: 500 TABLET ORAL at 23:49

## 2022-03-09 RX ADMIN — OXYCODONE 10 MG: 5 TABLET ORAL at 17:11

## 2022-03-09 RX ADMIN — OXYCODONE 5 MG: 5 TABLET ORAL at 22:00

## 2022-03-09 RX ADMIN — SENNOSIDES AND DOCUSATE SODIUM 1 TABLET: 50; 8.6 TABLET ORAL at 17:11

## 2022-03-09 RX ADMIN — AMITRIPTYLINE HYDROCHLORIDE 75 MG: 50 TABLET, FILM COATED ORAL at 21:12

## 2022-03-09 RX ADMIN — SODIUM CHLORIDE, PRESERVATIVE FREE 10 ML: 5 INJECTION INTRAVENOUS at 22:00

## 2022-03-09 RX ADMIN — LISINOPRIL 5 MG: 5 TABLET ORAL at 08:54

## 2022-03-09 RX ADMIN — KETOROLAC TROMETHAMINE 15 MG: 30 INJECTION, SOLUTION INTRAMUSCULAR; INTRAVENOUS at 06:18

## 2022-03-09 NOTE — PROGRESS NOTES
Problem: Mobility Impaired (Adult and Pediatric)  Goal: *Acute Goals and Plan of Care (Insert Text)  Description: FUNCTIONAL STATUS PRIOR TO ADMISSION: Patient was independent and active without use of DME.    HOME SUPPORT PRIOR TO ADMISSION: The patient lived with adult granddaughter but did not require assist.    Physical Therapy Goals  Initiated 3/9/2022    1. Patient will move from supine to sit and sit to supine , scoot up and down, and roll side to side in bed with modified independence utilizing log roll technique within 4 days. 2. Patient will perform sit to stand with modified independence within 4 days. 3. Patient will ambulate with modified independence for 150 feet with the least restrictive device within 4 days. 4. Patient will ascend/descend 4 stairs with one handrail(s) with modified independence within 4 days. 5. Patient will verbalize and demonstrate understanding of spinal precautions (No bending, lifting greater than 5 lbs, or twisting; log-roll technique; frequent repositioning as instructed) within 4 days. 3/9/2022 1440 by Mariah Fisher  Outcome: Not Progressing Towards Goal   PHYSICAL THERAPY TREATMENT  Patient: Devin Al (78 y.o. female)  Date: 3/9/2022  Diagnosis: Status post surgery [Z98.890]  S/P lumbar fusion [Z98.1] <principal problem not specified>  Procedure(s) (LRB):  L4-S1 OBLIQUE LATERAL LUMBAR INTERBODY FUSION (N/A) 1 Day Post-Op  Precautions: Fall,Spinal  Chart, physical therapy assessment, plan of care and goals were reviewed. ASSESSMENT  Patient continues with skilled PT services and is not progressing towards goals. Patient's left foot and ankle were wrapped with temporary ace wrap that reaches up to just below knee to provide neutral positioning (to counteract foot drop). Patient stood and worked on [employer] shift with therapist stabilizing left knee. Both knees buckled x 2 causing her to plop back (seated) onto bed.  Patient tries to move LLE and has difficulty placing it, stating that \"I can't really feel where my left leg is so I can't put weight on it. \"  Patient is noted to have increased toe extension this afternoon. States that \"the more I do, the more I think that I can feel it. \" Patient given ankle pumps, quad sets, glute sets, heel slides, isometric hip adduction for B LEs. Left quad set improved as she repeated same. Will continue to assess tomorrow. Ace wrap removed as patient's sensation is compromised. Do not recommend bracing this early her recovery. Current Level of Function Impacting Discharge (mobility/balance): Moderate assistance for bed mobility and sit-stand. Other factors to consider for discharge: B LE weakness & decreased sensation/Motivated/A & O x 4/Supportive Family/Independent PLOF         PLAN :  Patient continues to benefit from skilled intervention to address the above impairments. Continue treatment per established plan of care. to address goals. Recommendation for discharge: (in order for the patient to meet his/her long term goals)  Therapy 3 hours per day 5-7 days per week    This discharge recommendation:  A follow-up discussion with the attending provider and/or case management is planned    IF patient discharges home will need the following DME: to be determined (TBD)       SUBJECTIVE:   Patient stated I can't tell where my leg.     OBJECTIVE DATA SUMMARY:   Critical Behavior:  Neurologic State: Alert  Orientation Level: Oriented X4  Cognition: Appropriate decision making,Appropriate for age attention/concentration,Appropriate safety awareness,Follows commands     Functional Mobility Training:  Bed Mobility:     Supine to Sit: Moderate assistance;Assist x1  Sit to Supine: Moderate assistance;Assist x1  Scooting: Minimum assistance;Assist x1        Transfers:  Sit to Stand: Minimum assistance  Stand to Sit: Minimum assistance                             Balance:  Sitting: Intact  Standing: Impaired; With support  Standing - Static: Fair;Constant support  Standing - Dynamic : Poor;Constant support  Ambulation/Gait Training:                                Therapeutic Exercises: Ankle pumps  Quad Sets  Glute Sets  Heel Slides  Isometric hip adduction    Pain Rating:  3/10    Activity Tolerance:   Fair    After treatment patient left in no apparent distress:   Supine in bed, Call bell within reach, Caregiver / family present, Side rails x 3, and nurse notified. COMMUNICATION/COLLABORATION:   The patients plan of care was discussed with: Physical therapist, Physician, and Case management.      Abimbola Pierce   Time Calculation: 30 mins
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
## 2022-03-09 NOTE — PROGRESS NOTES
Problem: Mobility Impaired (Adult and Pediatric)  Goal: *Acute Goals and Plan of Care (Insert Text)  Description: FUNCTIONAL STATUS PRIOR TO ADMISSION: Patient was independent and active without use of DME.    HOME SUPPORT PRIOR TO ADMISSION: The patient lived with adult granddaughter but did not require assist.    Physical Therapy Goals  Initiated 3/9/2022    1. Patient will move from supine to sit and sit to supine , scoot up and down, and roll side to side in bed with modified independence utilizing log roll technique within 4 days. 2. Patient will perform sit to stand with modified independence within 4 days. 3. Patient will ambulate with modified independence for 150 feet with the least restrictive device within 4 days. 4. Patient will ascend/descend 4 stairs with one handrail(s) with modified independence within 4 days. 5. Patient will verbalize and demonstrate understanding of spinal precautions (No bending, lifting greater than 5 lbs, or twisting; log-roll technique; frequent repositioning as instructed) within 4 days. Outcome: Progressing Towards Goal   PHYSICAL THERAPY EVALUATION  Patient: Lety Arias (78 y.o. female)  Date: 3/9/2022  Primary Diagnosis: Status post surgery [Z98.890]  S/P lumbar fusion [Z98.1]  Procedure(s) (LRB):  L4-S1 OBLIQUE LATERAL LUMBAR INTERBODY FUSION (N/A) 1 Day Post-Op   Precautions:  Fall,Spinal    ASSESSMENT  Based on the objective data described below, the patient presents with  impairment in functional mobility, activity tolerance and balance s/p L4-S1 oblique lateral lumbar interbody fusion. PLOF: Independent with ADLs and IADLs. Patient lives with adult granddaughter in a one story home with 4 steps to enter. Patient states that her daughter is also available to assist her upon discharge. PT educated patient on post-op spinal surgery precautions to include: NO BENDING/NO LIFTING/NO TWISTING. Log Rolling for in-out of bed. How to don/doff brace.   Wear back brace when out of bed (sitting/walking). Patient presents in bed. Upon testing LLE was noted to be significantly weak from increasing weakness from proximal to distal. Left hip grossly 3+, left knee grossly 3, left ankle grossly 2/5. Patient performed bed mobility, sit-stand-sit transfers at RW x 3, stand-pivot from bed to bed side commode adjacent to bed. Patient was assessed by JOSELO Sanchez. Will see again this pm and reassess, try to walk if able. Current Level of Function Impacting Discharge (mobility/balance): Moderate assistance of 1 for bed mobility, sit-stand    Functional Outcome Measure: The patient scored 50/100 on the Barthel outcome measure which is indicative of moderate impaired ability to care for basic self-needs/dependency on others. .      Other factors to consider for discharge: LLE Weakness/Motivated/A & O x 4/Supportive Family/Independent PLOF      Patient will benefit from skilled therapy intervention to address the above noted impairments. PLAN :  Recommendations and Planned Interventions: bed mobility training, transfer training, gait training, therapeutic exercises, patient and family training/education, and therapeutic activities      Frequency/Duration: Patient will be followed by physical therapy:  twice daily to address goals. Recommendation for discharge: (in order for the patient to meet his/her long term goals)  Physical therapy at least 2 days/week in the home     This discharge recommendation:  Has been made in collaboration with the attending provider and/or case management    IF patient discharges home will need the following DME: to be determined (TBD)         SUBJECTIVE:   Patient stated I am not in much pain.     OBJECTIVE DATA SUMMARY:   HISTORY:    Past Medical History:   Diagnosis Date    Arthritis     B12 deficiency     Chronic pain     LOWER BACK    DDD (degenerative disc disease), cervical 11/2016    GERD (gastroesophageal reflux disease)     History of screening mammography 5/3/2012    Lumbar radiculopathy     Lumbar spondylosis     Lumbar stenosis     MGUS (monoclonal gammopathy of unknown significance)     Neuropathy 11/3/2011    Peripheral neuropathy     S/P arthroscopy of shoulder 11/3/2011    S/P cholecystectomy 11/3/2011    S/P hemorrhoidectomy 11/3/2011    S/P LUCA-BSO 11/3/2011    TMJ pain dysfunction syndrome     Unspecified adverse effect of anesthesia     loss of appetite after shoulder surgery     Past Surgical History:   Procedure Laterality Date    COLONOSCOPY N/A 6/25/2019    COLONOSCOPY performed by Marina Paz [facility] at [facility]. [address], COLON, DIAGNOSTIC  2001    normal ([place])    HX CHOLECYSTECTOMY      HX COLOSTOMY  1985    BOWEL NICKED DURING HYSTERECTOMY REQUIRING COLOSTOMY    HX COLOSTOMY TAKE DOWN  1985    HX HYSTERECTOMY      HX SHOULDER REPLACEMENT Bilateral        Personal factors and/or comorbidities impacting plan of care: LLE Weakness/Motivated/A & O x 4/Supportive Family/Independent PLOF     Home Situation  Home Environment: Private residence  # Steps to Enter: 4  Rails to Enter: Yes  Hand Rails : Right  Wheelchair Ramp: No  One/Two Story Residence: Two story, live on 1st floor  Living Alone: No  Support Systems: Other Family Member(s),Child(chas)  Patient Expects to be Discharged to[de-identified] Home with home health  Current DME Used/Available at Home: None  Tub or Shower Type: Tub/Shower combination    EXAMINATION/PRESENTATION/DECISION MAKING:   Critical Behavior:  Neurologic State: Alert  Orientation Level: Oriented X4  Cognition: Appropriate decision making,Appropriate for age attention/concentration,Appropriate safety awareness,Follows commands     Hearing:     Skin:  dressing intact with no drainage appreciated  Edema: None observed  Range Of Motion:  AROM: Grossly decreased, non-functional (LLE (see narrative for details))           PROM: Within functional limits           Strength: Strength: Grossly decreased, non-functional (weakness increases from proximal to distal) see narrative)                    Tone & Sensation:   Tone: Abnormal              Sensation: Impaired               Coordination:  Coordination: Generally decreased, functional  Vision:      Functional Mobility:  Bed Mobility:     Supine to Sit: Moderate assistance;Assist x1 (log rolling)  Sit to Supine: Moderate assistance;Assist x1 (log rolling)  Scooting: Minimum assistance;Assist x1  Transfers:  Sit to Stand: Moderate assistance  Stand to Sit: Minimum assistance                       Balance:   Sitting: Intact  Standing: Impaired; With support  Standing - Static: Fair;Constant support  Standing - Dynamic : Poor;Constant support (LLE buckling and she did not have good control)  Ambulation/Gait Training:   Unable         Functional Measure:  Barthel Index:    Bathin  Bladder: 10  Bowels: 10  Groomin  Dressin  Feeding: 10  Mobility: 0  Stairs: 0  Toilet Use: 5  Transfer (Bed to Chair and Back): 5  Total: 50/100       The Barthel ADL Index: Guidelines  1. The index should be used as a record of what a patient does, not as a record of what a patient could do. 2. The main aim is to establish degree of independence from any help, physical or verbal, however minor and for whatever reason. 3. The need for supervision renders the patient not independent. 4. A patient's performance should be established using the best available evidence. Asking the patient, friends/relatives and nurses are the usual sources, but direct observation and common sense are also important. However direct testing is not needed. 5. Usually the patient's performance over the preceding 24-48 hours is important, but occasionally longer periods will be relevant. 6. Middle categories imply that the patient supplies over 50 per cent of the effort. 7. Use of aids to be independent is allowed.     Score Interpretation (from [address] [address])    Independent   60-79 Minimally independent   40-59 Partially dependent   20-39 Very dependent   <20 Totally dependent     -Jeanne Oh., Barthel, D.W. (1965). Functional evaluation: the Barthel Index. [address] ([address] (1997). The Barthel activities of daily living index: self-reporting versus actual performance in the old (> or = 75 years). Journal of [address] [place] [address] 45(7), [address] [facility], TOBIN, Demetrio Bloodgojuan., Josep Soto. (1999). Measuring the change in disability after inpatient rehabilitation; comparison of the responsiveness of the Barthel Index and Functional [place] Measure. Journal of Neurology, Neurosurgery, and Psychiatry, 66(4), 451-186. AALIYAH Arreola, PARESH Cuevas, & Shikha Garcia MHEIDI (2004) Assessment of post-stroke quality of life in cost-effectiveness studies: The usefulness of the Barthel Index and the EuroQoL-5D. Quality of Life Research, 15, 436-49           Physical Therapy Evaluation Charge Determination   History Examination Presentation Decision-Making   MEDIUM  Complexity : 1-2 comorbidities / personal factors will impact the outcome/ POC  MEDIUM Complexity : 3 Standardized tests and measures addressing body structure, function, activity limitation and / or participation in recreation  MEDIUM Complexity : Evolving with changing characteristics  LOW Complexity : FOTO score of       Based on the above components, the patient evaluation is determined to be of the following complexity level: LOW     Pain Rating:  3/10    Activity Tolerance:   Fair    After treatment patient left in no apparent distress:   Supine in bed, Call bell within reach, Side rails x 3, and nurse notified of deficits. COMMUNICATION/EDUCATION:   The patients plan of care was discussed with: Registered nurse, Physician, and Certified nursing assistant/patient care technician.      Fall prevention education was provided and the patient/caregiver indicated understanding., Patient/family have participated as able in goal setting and plan of care. , and Patient/family agree to work toward stated goals and plan of care.     Thank you for this referral.  Yumi Lagos   Time Calculation: 30 mins

## 2022-03-09 NOTE — ANESTHESIA POSTPROCEDURE EVALUATION
Procedure(s):  L4-S1 OBLIQUE LATERAL LUMBAR INTERBODY FUSION. general    Anesthesia Post Evaluation        Patient participation: complete - patient participated  Level of consciousness: awake  Pain management: adequate  Airway patency: patent  Anesthetic complications: no  Cardiovascular status: hemodynamically stable  Respiratory status: acceptable  Hydration status: acceptable  Comments: The patient is ready for PACU discharge. Brooklynn Hollins DO                   Post anesthesia nausea and vomiting:  controlled      INITIAL Post-op Vital signs:   Vitals Value Taken Time   /77 03/08/22 1420   Temp 36.3 °C (97.4 °F) 03/08/22 1420   Pulse 106 03/08/22 1422   Resp 14 03/08/22 1422   SpO2 87 % 03/08/22 1422   Vitals shown include unvalidated device data.

## 2022-03-09 NOTE — PROGRESS NOTES
Spiritual Care Assessment/Progress Note  [facility]      NAME: Sd Guzman      MRN: [id number]  AGE: 78 y.o.  SEX: female  [place] Affiliation: Jehovah's witness   Language: English     3/9/2022     Total Time (in minutes): 5     Spiritual Assessment begun in [facility] through conversation with:         [x]Patient        [x] Family    [] Friend(s)        Reason for Consult: [facility]     Spiritual beliefs: (Please include comment if needed)     [x] Identifies with a alaina tradition:  Jehovah's witness       [x] Supported by a alaina community: [employer]           [] Claims no spiritual orientation:           [] Seeking spiritual identity:                [] Adheres to an individual form of spirituality:           [] Not able to assess:                           Identified resources for coping:      [x] Prayer                               [x] Music                  [] Guided Imagery     [x] Family/friends                 [] Pet visits     [x] Devotional reading                         [] Unknown     [] Other:                                              Interventions offered during this visit: (See comments for more details)    Patient Interventions: Affirmation of alaina,Communion (Jehovah's witness),Initial/Spiritual assessment, patient floor,Prayer (actual),Prayer (assurance of)     Family/Friend(s): Other (comment) (Music Therapy)     Plan of Care:     [x] Support spiritual and/or cultural needs    [] Support AMD and/or advance care planning process      [] Support grieving process   [] Coordinate Rites and/or Rituals    [] Coordination with community clergy   [] No spiritual needs identified at this time   [] Detailed Plan of Care below (See Comments)  [] Make referral to Music Therapy  [] Make referral to Pet Therapy     [] Make referral to Addiction services  [] Make referral to [facility]  [] Make referral to Spiritual Care Partner  [] No future visits requested        [] Contact Spiritual Care for further referrals     Comments: Mrs. René Melton was in bed. Her granddaughter was with her. Her granddaughter is living with her while she attends [employer]. Prayer and communion offered.       BARRETT Carrillo, RN, ACSW, LCSW   Page:  [phone]

## 2022-03-09 NOTE — DISCHARGE INSTRUCTIONS
After Hospital Care Plan:  Discharge Instructions Lumbar Fusion Surgery   Dr. Maddie Lowe     Patient Name: jJ Pastor    Date of procedure: 3/8/2022     Procedure:  L4-S1 OBLIQUE LATERAL LUMBAR INTERBODY FUSION     Follow up appointments  -follow up with Dr. Maddie Lowe in 2 weeks. Call [phone] to make an appointment as soon as you get home from the hospital.    When to call your Spine Surgeon:  -Signs of infection-if your incision is red; continues to have drainage; drainage has a foul odor or if you have a persistent fever over 101 degrees for 24 hours  -Nausea or vomiting, severe headache  -Loss of bowel or bladder function, inability to urinate  -Changes in sensation in your arms or legs (numbness, tingling, loss of color)  -Increased weakness-greater than before your surgery  -Severe pain or pain not relieved by medications  -Signs of a blood clot in your leg-calf pain, tenderness, redness, swelling of lower leg    When to call your Primary Care Physician:  -Concerns about medical conditions such as diabetes, high blood pressure, asthma, congestive heart failure  -Call if blood sugars are elevated, persistent headache or dizziness, coughing or congestion, constipation or diarrhea, burning with urination, abnormal heart rate    When to call 911 and go to the nearest emergency room:  -Acute onset of chest pain, shortness of breath, difficulty breathing    Activity  -You are going home a well person, be as active as possible. Your only exercise should be walking. Start with short frequent walks and increase your walking distance each day.  -Limit the amount of time you sit to 20-30 minute intervals. Sitting for prolonged periods of time will be uncomfortable for you following surgery.  -Do NOT lift anything over 5 pounds  -Do NOT do any straining, twisting or bending  -When you are in bed, you may lay on your back or on either side.   Do NOT lie on your stomach    Brace  -If you have a back brace, you should wear your brace at all times when you are out of bed. Do not wear the brace while in bed or showering.  -Remember to always wear a cotton t-shirt underneath your brace.  -Do not bend or twist when your brace is off. Diet  -Resume usual diet; drink plenty of fluids; eat foods high in fiber  -It is important to have regular bowel movements. Pain medications may cause constipation. You may want to take a stool softener (such as Senokot-S or Colace) to prevent constipation.   -If constipation occurs, take a laxative (such as Dulcolax tablets, Milk of Magnesia, or a suppository). Laxatives should only be used if the above preventable measures have failed and you still have not had a bowel movement after three days    Driving  -You may not drive or return to work until instructed by your physician. However, you may ride in the car for short periods of time. Incision Care  -You may take brief showers but do not run the water run directly onto the wound. After showering or bathing, remove the wet dressing and gently blot the wound dry with a soft towel.  -Do not rub or apply any lotions or ointments to your incision site.   -Do not soak or scrub your wound  -Keep a dry dressing (guaze and paper tape) on your incision and have it changed daily for 14 days after surgery; more often if your incision is draining. Have your caregiver wash their hands thoroughly before changing your dressing.  -You will have absorbable sutures and skin glue on your incision. Skin glue will fall off on its own; do not pick at your incision. Showering  -You may shower in approximately 4 days after your surgery.    -Leave the dressing on during your shower. Do NOT allow the water to run directly onto your dressing. Once you get out of the shower, pat the area dry with a towel and put on a dry dressing.  -Reminder- your brace can be removed while showering.  Remember to not bend or twist while your brace is off.    -Do not take a tub bath. Preventing blood clots  -You have been given T.E.D. stockings to wear. Continue to wear these for 7 days after your discharge. Put them on in the morning and take them off at night.    -They are used to increase your circulation and prevent blood clots from forming in your legs  -T. E.D. stockings can be machine washed, temperature not to exceed 160° F (71°C) and machine dried for 15 to 20 minutes, temperature not to exceed 250° F (121°C). Pain management  -Take pain medication as prescribed; decrease the amount you use as your pain lessens  -DO not wait until you are in extreme pain to take your medication.  -Avoid alcoholic beverages while taking pain medication    Pain Medication Safety  DO:  -Read the Medication Guide   -Take your medicine exactly as prescribed   -Store your medicine away from children and in a safe place   -Call your healthcare provider for medical advice about side effects.  -Please be aware that many medications contain Tylenol. We do not want you to over medicate so please read the information below as a guide. Do not take more than 4 Grams of Tylenol in a 24 hour period. (There are 1000 milligrams in one Gram)                                                                                                                                                                                                                                                                                                                                                                  Percocet contains 325 mg of Tylenol per tablet (do not take more than 12 tablets in 24 hours)  Lortab contains 500 mg of Tylenol per tablet (do not take more than 8 tablets in 24 hours)  Norco contains 325 mg of Tylenol per tablet (do not take more than 12 tablets in 24 hours).   DO NOT:  -Do not give your medicine to others   -Do not take medicine unless it was prescribed for you   -Do not stop taking your medicine without talking to your healthcare provider   -Do not break, chew, crush, dissolve, or inject your medicine. If you cannot swallow your medicine whole, talk to your healthcare provider.  -Do not drink alcohol while taking this medicine  -Do not take anti-inflammatory medications or aspirin unless instructed by your physician.

## 2022-03-09 NOTE — PROGRESS NOTES
Problem: Falls - Risk of  Goal: *Absence of Falls  Description: Document Calvin Tushars Fall Risk and appropriate interventions in the flowsheet.   Outcome: Progressing Towards Goal  Note: Fall Risk Interventions:  Mobility Interventions: Communicate number of staff needed for ambulation/transfer,Patient to call before getting OOB,PT Consult for mobility concerns,PT Consult for assist device competence         Medication Interventions: Evaluate medications/consider consulting pharmacy,Patient to call before getting OOB,Teach patient to arise slowly         History of Falls Interventions: Evaluate medications/consider consulting pharmacy,Assess for delayed presentation/identification of injury for 48 hrs (comment for end date)         Problem: Simple Spine Procedure:  Day of Surgery  Goal: Activity/Safety  Outcome: Progressing Towards Goal  Goal: Nutrition/Diet  Outcome: Progressing Towards Goal  Goal: Discharge Planning  Outcome: Progressing Towards Goal  Goal: Medications  Outcome: Progressing Towards Goal  Goal: Respiratory  Outcome: Progressing Towards Goal  Goal: Treatments/Interventions/Procedures  Outcome: Progressing Towards Goal  Goal: Psychosocial  Outcome: Progressing Towards Goal  Goal: *Verbalizes understanding of type and use of pain medication  Outcome: Progressing Towards Goal  Goal: *Optimal pain control at patient's stated goal  Outcome: Progressing Towards Goal  Goal: *Verbalizes/demonstrates understanding of proper body mechanics and use of stabilization device if ordered  Outcome: Progressing Towards Goal  Goal: *Activity level attained as ordered  Outcome: Progressing Towards Goal  Goal: *Active bowel sounds  Outcome: Progressing Towards Goal  Goal: *Respiratory status stable  Outcome: Progressing Towards Goal  Goal: *Adequate urinary output  Outcome: Progressing Towards Goal  Note: REMOVE VAZQUEZ PO DAY #1  Goal: *Hemodynamically stable  Outcome: Progressing Towards Goal

## 2022-03-09 NOTE — PROGRESS NOTES
Bedside and Verbal shift change report given to Chas Agarwal RN (oncoming nurse) by Jason Steele RN (offgoing nurse). Report included the following information SBAR, Kardex, Procedure Summary, Intake/Output, MAR, Accordion and Recent Results.

## 2022-03-09 NOTE — PROGRESS NOTES
I prayed with [facility] and celebrated with her the [address].   Father Geovanna Enrique

## 2022-03-09 NOTE — PROGRESS NOTES
ZAYNAB: IPR referrals pending. SNF choices pending for back up. The patient plans to discharge to rehab with BLS transport when stable for discharge. RUR: 3%    1. The patient is from home and lives with adult grand daughter. 2. Orthopedics, PT/OT following. 3. The patient plans to discharge to rehab. Care Management Interventions  PCP Verified by CM: Yes  Palliative Care Criteria Met (RRAT>21 & CHF Dx)?: No  Mode of Transport at Discharge: Other (see comment)  Transition of Care Consult (CM Consult): Acute Rehab,SNF  Partner SNF: No  MyChart Signup: Yes  Discharge Durable Medical Equipment: No  Health Maintenance Reviewed: Yes  Physical Therapy Consult: Yes  Occupational Therapy Consult: Yes  Speech Therapy Consult: No  Support Systems: Child(chas)  Confirm Follow Up Transport: Family  The Plan for Transition of Care is Related to the Following Treatment Goals : The patient plans to discharge to rehab.   The Patient and/or Patient Representative was Provided with a Choice of Provider and Agrees with the Discharge Plan?: Yes  Freedom of Choice List was Provided with Basic Dialogue that Supports the Patient's Individualized Plan of Care/Goals, Treatment Preferences and Shares the Quality Data Associated with the Providers?: Yes  [place] Resource Information Provided?: No  Discharge Location  Patient Expects to be Discharged to[de-identified] Rehab hospital/unit acute     Reason for Admission:     L4-S1 OBLIQUE LATERAL LUMBAR INTERBODY FUSION                            RUR Score:     3%                Plan for utilizing home health:    IPR vs SNF     PCP: First and Last name:  Carter Palacios MD     Name of Practice:    Are you a current patient: Yes/No: Y   Approximate date of last visit: Feb, 2022   Can you participate in a virtual visit with your PCP: Y                    Current Advanced Directive/Advance Care Plan: No Order      Healthcare Decision Maker:   Click here to complete [address] including selection of the Healthcare Decision Maker Relationship (ie \"Primary\")                             Transition of Care Plan:         CM met with the patient in room 569. The patient is alert and oriented x4. Confirmed demographics. Prior to surgery, the patient was independent with ADL's/IADL's, drives a vehicle and uses [facility] on [address]. The patient lives in a 2 level private residence with her adult grand daughter Estela Jarrell. There are 3 steps to enter her home and bedroom is on 1st level. The patient plans to go to [facility] for rehab services. Referrals pending. BLS to transport. CM following for discharge needs.     Kayleigh Ward RN/CRM

## 2022-03-09 NOTE — PROGRESS NOTES
Occupational Therapy Note:     Spoke with PT and pt noted with difficulty with WB this morning due to weakness in LE's. PT discussed with PA. Will follow up this afternoon as able and appropriate.         Osmar Garcia, OT

## 2022-03-09 NOTE — PROGRESS NOTES
Spine Surgery Progress Note  Hector Salmeron PA-C    Admit Date: 3/8/2022   LOS: 1 day      Daily Progress Note: 3/9/2022    POD:1 Day Post-Op    S/P: Procedure(s):  L4-S1 OBLIQUE LATERAL LUMBAR INTERBODY FUSION    Subjective:     Ms. Home Shaw is a very pleasant 68yo female with a PMH of HTN, GERD, anxiety, depression, dyslipidemia. She underwent L4-S1 OLLIF surgery on 3/8/22 for low back pain with radiculopathy. Workup prior to surgery included an MRI that revealed \"severe canal stenosis L4-L5\" & \"bilateral neural foraminal stenosis L4-S1\". She tolerated the procedure well. On POD#1, patient is doing well. Pain is well-controlled. Patient is voiding without issue. +flatus. She has not been out of bed yet. Pt endorses bilateral LE N/T but this is improved compared to prior to surgery. Left ankle strength is diminished. Patient states she had a recent left ankle sprain. Pt denies chest pain, nausea, vomiting, difficulty swallowing, headache, and dyspnea. Pt resting comfortably in bed. Objective:     Vital signs  VSS Afebrile. Temp (24hrs), Av.9 °F (36.6 °C), Min:97.4 °F (36.3 °C), Max:98.8 °F (37.1 °C)   No intake/output data recorded.  1901 -  0700  In: 1000 [I.V.:1000]  Out: 1350 [Urine:1300]    Visit Vitals  /64 (BP 1 Location: Left upper arm, BP Patient Position: Semi fowlers)   Pulse 83   Temp 98.2 °F (36.8 °C)   Resp 16   Ht 5' 4.5\" (1.638 m)   Wt 84.8 kg (186 lb 15.2 oz)   SpO2 99%   BMI 31.59 kg/m²    O2 Flow Rate (L/min): 2 l/min O2 Device: Nasal cannula     Pain control  Pain Assessment  Pain Scale 1: Numeric (0 - 10)  Pain Intensity 1: 7  Pain Location 1: Back  Pain Orientation 1: Lower  Pain Description 1: Aching  Pain Intervention(s) 1: Medication (see MAR)    PT/OT  Gait              Physical Exam:  Gen: No acute distress. Neuro: A&Ox3. Follows commands. Speech clear. Affect normal.  CUELLAR spontaneously.  Strength 5/5 inB UE and RLE. 5/5 strength LLE except at ankle; 5/5 plantarflexion; 1-2/5 dorsiflexion; EHL activating. Wiggles toes bilat. Sensation intact. Gait deferred. Calves soft and supple;  No pain with passive stretch  Skin: Incisions C/D/I    24 hour results:    Recent Results (from the past 24 hour(s))   METABOLIC PANEL, BASIC    Collection Time: 03/09/22  1:18 AM   Result Value Ref Range    Sodium 139 136 - 145 mmol/L    Potassium 4.1 3.5 - 5.1 mmol/L    Chloride 109 (H) 97 - 108 mmol/L    CO2 27 21 - 32 mmol/L    Anion gap 3 (L) 5 - 15 mmol/L    Glucose 118 (H) 65 - 100 mg/dL    BUN 18 6 - 20 MG/DL    Creatinine 0.95 0.55 - 1.02 MG/DL    BUN/Creatinine ratio 19 12 - 20      GFR est AA >60 >60 ml/min/1.73m2    GFR est non-AA 58 (L) >60 ml/min/1.73m2    Calcium 8.7 8.5 - 10.1 MG/DL   HEMOGLOBIN    Collection Time: 03/09/22  1:18 AM   Result Value Ref Range    HGB 9.9 (L) 11.5 - 16.0 g/dL        Assessment:     Active Problems:    Status post surgery (3/8/2022)      S/P lumbar fusion (3/8/2022)      Plan:     s/p L4-S1 OLLIF  -PT/OT - in lumbar brace    -Pain control - scheduled tylenol, gabapentin, prn oxycodone, valium   -Regular diet   -Recheck Hgb tomorrow   -Bowel regimen    Readiness for discharge:     [x] Vital Signs stable    [x] Hgb stable    [x] + Voiding    [x] Wound intact, drainage minimal    [x] Tolerating PO intake     [] Cleared by PT (OT if applicable)    [x] Adequate pain control on oral medication alone     Activity: up with assist  DVT ppx: SCDs  Dispo: pending PT clearance    Plan d/w Dr. Mukesh Monahan, PA

## 2022-03-09 NOTE — PROGRESS NOTES
Bedside and Verbal shift change report given to Jojo Esqueda RN (oncoming nurse) by Wanda Lind RN and Gricelda Eisenberg RN (offgoing nurse). Report included the following information SBAR and MAR.

## 2022-03-10 LAB — HGB BLD-MCNC: 10 G/DL (ref 11.5–16)

## 2022-03-10 PROCEDURE — 97116 GAIT TRAINING THERAPY: CPT

## 2022-03-10 PROCEDURE — 74011250637 HC RX REV CODE- 250/637: Performed by: PHYSICIAN ASSISTANT

## 2022-03-10 PROCEDURE — 36415 COLL VENOUS BLD VENIPUNCTURE: CPT

## 2022-03-10 PROCEDURE — 85018 HEMOGLOBIN: CPT

## 2022-03-10 PROCEDURE — 74011000250 HC RX REV CODE- 250: Performed by: PHYSICIAN ASSISTANT

## 2022-03-10 PROCEDURE — 97535 SELF CARE MNGMENT TRAINING: CPT

## 2022-03-10 PROCEDURE — 74011250636 HC RX REV CODE- 250/636: Performed by: PHYSICIAN ASSISTANT

## 2022-03-10 PROCEDURE — 65270000029 HC RM PRIVATE

## 2022-03-10 PROCEDURE — 97165 OT EVAL LOW COMPLEX 30 MIN: CPT

## 2022-03-10 RX ORDER — HYDRALAZINE HYDROCHLORIDE 10 MG/1
10 TABLET, FILM COATED ORAL
Status: DISCONTINUED | OUTPATIENT
Start: 2022-03-10 | End: 2022-03-11 | Stop reason: HOSPADM

## 2022-03-10 RX ADMIN — BUPROPION HYDROCHLORIDE 150 MG: 150 TABLET, EXTENDED RELEASE ORAL at 21:07

## 2022-03-10 RX ADMIN — LISINOPRIL 5 MG: 5 TABLET ORAL at 09:01

## 2022-03-10 RX ADMIN — GABAPENTIN 300 MG: 300 CAPSULE ORAL at 09:01

## 2022-03-10 RX ADMIN — OXYCODONE 10 MG: 5 TABLET ORAL at 09:00

## 2022-03-10 RX ADMIN — SODIUM CHLORIDE, PRESERVATIVE FREE 10 ML: 5 INJECTION INTRAVENOUS at 14:00

## 2022-03-10 RX ADMIN — ACETAMINOPHEN 1000 MG: 500 TABLET ORAL at 06:31

## 2022-03-10 RX ADMIN — AMITRIPTYLINE HYDROCHLORIDE 75 MG: 50 TABLET, FILM COATED ORAL at 21:09

## 2022-03-10 RX ADMIN — SENNOSIDES AND DOCUSATE SODIUM 1 TABLET: 50; 8.6 TABLET ORAL at 17:08

## 2022-03-10 RX ADMIN — POLYETHYLENE GLYCOL 3350 17 G: 17 POWDER, FOR SOLUTION ORAL at 09:00

## 2022-03-10 RX ADMIN — SODIUM CHLORIDE, PRESERVATIVE FREE 10 ML: 5 INJECTION INTRAVENOUS at 06:32

## 2022-03-10 RX ADMIN — ACETAMINOPHEN 1000 MG: 500 TABLET ORAL at 17:07

## 2022-03-10 RX ADMIN — DEXAMETHASONE SODIUM PHOSPHATE 4 MG: 4 INJECTION, SOLUTION INTRAMUSCULAR; INTRAVENOUS at 06:32

## 2022-03-10 RX ADMIN — ATORVASTATIN CALCIUM 20 MG: 10 TABLET, FILM COATED ORAL at 09:01

## 2022-03-10 RX ADMIN — PANTOPRAZOLE SODIUM 40 MG: 40 TABLET, DELAYED RELEASE ORAL at 06:31

## 2022-03-10 RX ADMIN — ACETAMINOPHEN 1000 MG: 500 TABLET ORAL at 11:22

## 2022-03-10 RX ADMIN — SENNOSIDES AND DOCUSATE SODIUM 1 TABLET: 50; 8.6 TABLET ORAL at 09:01

## 2022-03-10 RX ADMIN — GABAPENTIN 300 MG: 300 CAPSULE ORAL at 17:08

## 2022-03-10 RX ADMIN — OXYCODONE 5 MG: 5 TABLET ORAL at 17:08

## 2022-03-10 RX ADMIN — DEXAMETHASONE SODIUM PHOSPHATE 4 MG: 4 INJECTION, SOLUTION INTRAMUSCULAR; INTRAVENOUS at 11:22

## 2022-03-10 RX ADMIN — OXYCODONE 10 MG: 5 TABLET ORAL at 21:07

## 2022-03-10 NOTE — PROGRESS NOTES
Bedside and Verbal shift change report given to Wyman Curling, RN (oncoming nurse) by Berna Brody RN (offgoing nurse). Report included the following information SBAR, Intake/Output, MAR, Accordion and Recent Results.

## 2022-03-10 NOTE — PROGRESS NOTES
Spine Surgery Progress Note  Coleman Vaca PA-C    Admit Date: 3/8/2022   LOS: 2 days      Daily Progress Note: 3/10/2022    POD:2 Day Post-Op    S/P: Procedure(s):  L4-S1 OBLIQUE LATERAL LUMBAR INTERBODY FUSION    Subjective:     Ms. Walter Carlos is a very pleasant 68yo female with a PMH of HTN, GERD, anxiety, depression, dyslipidemia. She underwent L4-S1 OLLIF surgery on 3/8/22 for low back pain with radiculopathy. Workup prior to surgery included an MRI that revealed \"severe canal stenosis L4-L5\" & \"bilateral neural foraminal stenosis L4-S1\". She tolerated the procedure well. On POD#2, patient continues to do well. Pain is well-controlled. Patient is voiding without issue. +flatus. No BM yet. She has not been out of bed yet. Pt endorses bilateral LE N/T but this is improved compared to prior to surgery. Left ankle strength continues to be diminished. Pt denies chest pain, nausea, vomiting, difficulty swallowing, headache, and dyspnea. Pt resting comfortably in bed. Objective:     Vital signs  Temp (24hrs), Av.1 °F (36.7 °C), Min:97.7 °F (36.5 °C), Max:98.3 °F (36.8 °C)   No intake/output data recorded.  1901 - 03/10 0700  In: -   Out: 1025 [Urine:1025]    Visit Vitals  BP (!) 173/94 (BP 1 Location: Left upper arm, BP Patient Position: Sitting)   Pulse 97   Temp 97.7 °F (36.5 °C)   Resp 18   Ht 5' 4.5\" (1.638 m)   Wt 84.8 kg (186 lb 15.2 oz)   SpO2 100%   BMI 31.59 kg/m²    O2 Flow Rate (L/min): 2 l/min O2 Device: Nasal cannula     Pain control  Pain Assessment  Pain Scale 1: Numeric (0 - 10)  Pain Intensity 1: 4  Pain Location 1: Back  Pain Orientation 1: Lower  Pain Description 1: Aching  Pain Intervention(s) 1: Medication (see MAR)    PT/OT  Gait              Physical Exam:  Gen: No acute distress. Neuro: A&Ox3. Follows commands. Speech clear. Affect normal.  CUELLAR spontaneously.  Strength 5/5 inB UE and RLE. 5/5 strength LLE except at ankle; 5/5 plantarflexion; 1-2/5 dorsiflexion; EHL muscle fibers activating. Wiggles toes bilat. Sensation intact. Gait deferred. Calves soft and supple;  No pain with passive stretch  Skin: Incisions C/D/I    24 hour results:    Recent Results (from the past 24 hour(s))   HEMOGLOBIN    Collection Time: 03/10/22  3:47 AM   Result Value Ref Range    HGB 10.0 (L) 11.5 - 16.0 g/dL        Assessment:     Active Problems:    Status post surgery (3/8/2022)      S/P lumbar fusion (3/8/2022)      Plan:     s/p L4-S1 OLLIF  -PT/OT - in lumbar brace    -Pain control - scheduled tylenol, gabapentin, prn oxycodone, valium   -Regular diet   -Hgb stable   -Bowel regimen   -CM consult for IPR    Readiness for discharge:     [x] Vital Signs stable    [x] Hgb stable    [x] + Voiding    [x] Wound intact, drainage minimal    [x] Tolerating PO intake     [] Cleared by PT (OT if applicable)    [x] Adequate pain control on oral medication alone     Activity: up with assist  DVT ppx: SCDs  Dispo: pending PT clearance; likely will need IPR    Plan d/w JAZMIN Brown

## 2022-03-10 NOTE — PROGRESS NOTES
Rounded on Sikh patients and provided Anointing of the Sick at request of patient.     Marito Thornton

## 2022-03-10 NOTE — PROGRESS NOTES
Problem: Self Care Deficits Care Plan (Adult)  Goal: *Acute Goals and Plan of Care (Insert Text)  Description: FUNCTIONAL STATUS PRIOR TO ADMISSION: pt lives home with her daughter and granddaughter. She is independent with ADl and IADL activities. HOME SUPPORT PRIOR TO ADMISSION: Daughter and granddaughter will assist as needed. Occupational Therapy Goals  Initiated 3/10/2022     1. Patient will complete lower body dressing with supervision using AE as needed and following spine precautions within 7 days. 2.  Patient will complete lower body bathing with supervision following spine precautions within 7 days. 3.  Patient will implement spine precautions during ADLs and functional mobility with no verbal cues within 7 days. 4.  Patient will complete toilet transfer with supervision using DME as needed within 7 days. 5.  Patient will compete toileting with supervision following spine precautions with use of AE as needed within 7 days. 6.  Patient will complete grooming standing while maintaining spine precautions with supervision within 7 days. Outcome: Progressing Towards Goal    OCCUPATIONAL THERAPY EVALUATION  Patient: Ileana Knight (78 y.o. female)  Date: 3/10/2022  Primary Diagnosis: Status post surgery [Z98.890]  S/P lumbar fusion [Z98.1]  Procedure(s) (LRB):  L4-S1 OBLIQUE LATERAL LUMBAR INTERBODY FUSION (N/A) 2 Days Post-Op   Precautions:   Fall,Spinal    ASSESSMENT  Based on the objective data described below, the patient presents with continued LLE weakness and decreased proprioception, decreased compliance and carry over of spine precautions, decreased independence with basic ADL activities, and currently CG for functional transfers following lumbar laminectomy and fusion of her spine. Pt is currently CG for functional mobility and min A for LB ADLs.    Pt will continue to benefit from continued rehab to maximize independence and safety with all ADl activities and functional mobility following spine surgery with new LLE weakness. She is making great progress but continues to be well below her baseline which is independent. She will tolerate 3 hours of intense interdisciplinary therapy without difficulty. Current Level of Function Impacting Discharge (ADLs/self-care): CG to min A    Functional Outcome Measure: The patient scored 60 on the Barthel Index outcome measure which is indicative of 40% impairment with ADL activities. Other factors to consider for discharge: debility, LLE weakness and question foot drop, will have support from family at discharge. Patient will benefit from skilled therapy intervention to address the above noted impairments. PLAN :  Recommendations and Planned Interventions: self care training, functional mobility training, balance training, therapeutic activities, endurance activities, patient education, home safety training, and family training/education    Frequency/Duration: Patient will be followed by occupational therapy 5 times a week to address goals. Recommendation for discharge: (in order for the patient to meet his/her long term goals)  Therapy 3 hours per day 5-7 days per week    This discharge recommendation:  Has been made in collaboration with the attending provider and/or case management    IF patient discharges home will need the following DME: TBD       SUBJECTIVE:   Patient stated My granddaughter will go to nursing school after she graduates.     OBJECTIVE DATA SUMMARY:   HISTORY:   Past Medical History:   Diagnosis Date    Arthritis     B12 deficiency     Chronic pain     LOWER BACK    DDD (degenerative disc disease), cervical 11/2016    GERD (gastroesophageal reflux disease)     History of screening mammography 5/3/2012    Lumbar radiculopathy     Lumbar spondylosis     Lumbar stenosis     MGUS (monoclonal gammopathy of unknown significance)     Neuropathy 11/3/2011    Peripheral neuropathy     S/P arthroscopy of shoulder 11/3/2011    S/P cholecystectomy 11/3/2011    S/P hemorrhoidectomy 11/3/2011    S/P LUCA-BSO 11/3/2011    TMJ pain dysfunction syndrome     Unspecified adverse effect of anesthesia     loss of appetite after shoulder surgery     Past Surgical History:   Procedure Laterality Date    COLONOSCOPY N/A 6/25/2019    COLONOSCOPY performed by Korey Lopes MD at [facility]. [address], COLON, DIAGNOSTIC  2001    normal ([place]Assi)    HX CHOLECYSTECTOMY      HX COLOSTOMY  1985    BOWEL NICKED DURING HYSTERECTOMY REQUIRING COLOSTOMY    HX COLOSTOMY TAKE DOWN  1985    HX HYSTERECTOMY      HX SHOULDER REPLACEMENT Bilateral        Expanded or extensive additional review of patient history:     Home Situation  Home Environment: Private residence  # Steps to Enter: 3  Rails to Enter: Yes  Hand Rails : Bilateral (wideset)  Wheelchair Ramp: No  One/Two Story Residence: Two story, live on 1st floor  Living Alone: No  Support Systems: Child(chas)  Patient Expects to be Discharged to[de-identified] Rehab hospital/unit acute  Current DME Used/Available at Home: None  Tub or Shower Type: Tub/Shower combination    Hand dominance: Right    EXAMINATION OF PERFORMANCE DEFICITS:  Cognitive/Behavioral Status:  Neurologic State: Alert  Orientation Level: Oriented X4  Cognition: Appropriate for age attention/concentration  Perception: Appears intact  Perseveration: No perseveration noted  Safety/Judgement: Good awareness of safety precautions    Skin: see nursing notes    Edema: none noted    Hearing: Auditory  Auditory Impairment: None    Vision/Perceptual:                           Acuity: Within Defined Limits    Corrective Lenses: Glasses    Range of Motion:    AROM: Within functional limits  PROM: Within functional limits                      Strength:    Strength: Within functional limits                Coordination:  Coordination: Within functional limits  Fine Motor Skills-Upper: Right Intact; Left Intact Gross Motor Skills-Upper: Right Intact; Left Intact    Tone & Sensation:    Tone: Normal  Sensation: Intact                      Balance:  Sitting: Intact  Standing: Impaired; With support  Standing - Static: Constant support; Fair  Standing - Dynamic : Constant support; Fair    Functional Mobility and Transfers for ADLs:  Bed Mobility:  Supine to Sit: Additional time;Supervision  Sit to Supine:  (remained sitting in chair)    Transfers:  Sit to Stand: Contact guard assistance  Stand to Sit: Contact guard assistance  Bed to Chair: Contact guard assistance  Bathroom Mobility: Contact guard assistance  Toilet Transfer : Contact guard assistance    ADL Assessment:  Feeding: Independent    Oral Facial Hygiene/Grooming: Contact guard assistance    Bathing: Minimum assistance    Upper Body Dressing: Setup    Lower Body Dressing: Minimum assistance    Toileting: Minimum assistance                ADL Intervention and task modifications:   Pt received supine in bed and progressed to EOB following spine precautions. Pt initially able to recall 0/3 precautions when asked so pt was provided with reeducation for spine precautions. Following education, she was able to recall 2/3 precautions  independent. She log rolled OOB with supervision with cues for technique and overall did well with tasks. She does have improved control of LLE and noted with ability to complete hip flexion while sitting. Left side continues to be weaker than her right side. Also, no active dorsiflexion on the left foot but does have toe extension. She stood with CG with cues for safety and hand placement. She was able to walk to the bathroom with CG and did well with no overt LOB. She voided her bladder and completed hygiene and clothing management with min A. Pt was able to mobilize to the sink with CG to complete washing her hands. Provided cues and education for spine precautions to avoid twisting at the sink. Returned to EOB to [facility]. While turning to EOB, pt noted with inversion of her left ankle and flexion of her toes making her foot stick to the floor and needing verbal and tactile cues provided by PTA to correct foot position with walking. Cognitive Retraining  Safety/Judgement: Good awareness of safety precautions    Functional Measure:    Barthel Index:  Bathin  Bladder: 10  Bowels: 10  Groomin  Dressin  Feeding: 10  Mobility: 5  Stairs: 0  Toilet Use: 5  Transfer (Bed to Chair and Back): 10  Total: 60/100      The Barthel ADL Index: Guidelines  1. The index should be used as a record of what a patient does, not as a record of what a patient could do. 2. The main aim is to establish degree of independence from any help, physical or verbal, however minor and for whatever reason. 3. The need for supervision renders the patient not independent. 4. A patient's performance should be established using the best available evidence. Asking the patient, friends/relatives and nurses are the usual sources, but direct observation and common sense are also important. However direct testing is not needed. 5. Usually the patient's performance over the preceding 24-48 hours is important, but occasionally longer periods will be relevant. 6. Middle categories imply that the patient supplies over 50 per cent of the effort. 7. Use of aids to be independent is allowed. Score Interpretation (from [address] [address])    Independent   60-79 Minimally independent   40-59 Partially dependent   20-39 Very dependent   <20 Totally dependent     -Jeanne Oh., Barthel, D.W. (1965). Functional evaluation: the Barthel Index. [address] [facility] ([address] (1997). The Barthel activities of daily living index: self-reporting versus actual performance in the old (> or = 75 years).  Journal of [address] [place] [address] 45(7), [address] [facility], JERIC, Diana Steele., Beau Choudhury. (1999). Measuring the change in disability after inpatient rehabilitation; comparison of the responsiveness of the Barthel Index and Functional [place] Measure. Journal of Neurology, Neurosurgery, and Psychiatry, 66(4), 082-335. AALIYAH Leija, PARESH Cuevas, & Antonietta Velazquez M.A. (2004) Assessment of post-stroke quality of life in cost-effectiveness studies: The usefulness of the Barthel Index and the EuroQoL-5D. Quality of Life Research, 15, 247-50         Occupational Therapy Evaluation Charge Determination   History Examination Decision-Making   LOW Complexity : Brief history review  HIGH Complexity : 5 or more performance deficits relating to physical, cognitive , or psychosocial skils that result in activity limitations and / or participation restrictions HIGH Complexity : Patient presents with comorbidities that affect occupational performance. Signifigant modification of tasks or assistance (eg, physical or verbal) with assessment (s) is necessary to enable patient to complete evaluation       Based on the above components, the patient evaluation is determined to be of the following complexity level: LOW   Pain Rating:  No pain    Activity Tolerance:   Good    After treatment patient left in no apparent distress:    Sitting in chair and Call bell within reach    COMMUNICATION/EDUCATION:   The patients plan of care was discussed with: Physical therapy assistant and Registered nurse. Home safety education was provided and the patient/caregiver indicated understanding. and Patient/family have participated as able in goal setting and plan of care. This patients plan of care is appropriate for delegation to [facility].     Thank you for this referral.  Sly Orr OT  Time Calculation: 35 mins

## 2022-03-10 NOTE — PROGRESS NOTES
Problem: Mobility Impaired (Adult and Pediatric)  Goal: *Acute Goals and Plan of Care (Insert Text)  Description: FUNCTIONAL STATUS PRIOR TO ADMISSION: Patient was independent and active without use of DME.    HOME SUPPORT PRIOR TO ADMISSION: The patient lived with adult granddaughter but did not require assist.    Physical Therapy Goals  Initiated 3/9/2022    1. Patient will move from supine to sit and sit to supine , scoot up and down, and roll side to side in bed with modified independence utilizing log roll technique within 4 days. 2. Patient will perform sit to stand with modified independence within 4 days. 3. Patient will ambulate with modified independence for 150 feet with the least restrictive device within 4 days. 4. Patient will ascend/descend 4 stairs with one handrail(s) with modified independence within 4 days. 5. Patient will verbalize and demonstrate understanding of spinal precautions (No bending, lifting greater than 5 lbs, or twisting; log-roll technique; frequent repositioning as instructed) within 4 days. Outcome: Progressing Towards Goal  PHYSICAL THERAPY TREATMENT  Patient: Julee Leger (78 y.o. female)  Date: 3/10/2022  Diagnosis: Status post surgery [Z98.890]  S/P lumbar fusion [Z98.1] <principal problem not specified>  Procedure(s) (LRB):  L4-S1 OBLIQUE LATERAL LUMBAR INTERBODY FUSION (N/A) 2 Days Post-Op  Precautions: Fall,Spinal No bending, no lifting greater than 5 lbs, no twisting, log-roll technique, repositioning every 20-30 min except when sleeping, brace when OOB (if ordered)  Chart, physical therapy assessment, plan of care and goals were reviewed. ASSESSMENT  Patient continues with skilled PT services and is progressing towards goals. Believe she will be able to tolerate 3hrs of IPR. LLE weakness improving however still unable to perform active DF. She was able to amb to/from bathroom w/ RW and CGA, demonstrating step to, steppage gait pattern to help clear LLE. Brace donned w/ set up-Min A. Pt remained OOB in chair w/ all items in reach and met. Instructed pt to sit for approx 30-45 minutes at a time and call for NSG assist when ready to get up. Current Level of Function Impacting Discharge (mobility/balance): CGAx1     Other factors to consider for discharge: Mobility below baseline compared to PLOF. PLAN :  Patient continues to benefit from skilled intervention to address the above impairments. Continue treatment per established plan of care. to address goals. Recommendation for discharge: (in order for the patient to meet his/her long term goals)  Therapy 3 hours per day 5-7 days per week    This discharge recommendation:  Has been made in collaboration with the attending provider and/or case management    IF patient discharges home will need the following DME: rolling walker       SUBJECTIVE:   Patient stated Nice to meet you.     OBJECTIVE DATA SUMMARY:   Critical Behavior:  Neurologic State: Alert  Orientation Level: Oriented X4  Cognition: Appropriate for age attention/concentration  Safety/Judgement: Good awareness of safety precautions    Spinal diagnosis intervention:  The patient stated 2/3 back precautions when prompted. Reviewed all 3 back precautions, log roll technique, and sitting for 30 minutes at a time. Functional Mobility Training:    Bed Mobility:  Log    Supine to Sit: Additional time;Supervision  Sit to Supine:  (remained sitting in chair)           Transfers:  Sit to Stand: Contact guard assistance  Stand to Sit: Contact guard assistance        Bed to Chair: Contact guard assistance                    Balance:  Sitting: Intact  Standing: Impaired; With support  Standing - Static: Constant support; Fair  Standing - Dynamic : Constant support; Fair  Ambulation/Gait Training:  Distance (ft): 12 Feet (ft)  Assistive Device: Brace/Splint;Gait belt;Walker, rolling  Ambulation - Level of Assistance: Contact guard assistance;Assist x1 Gait Abnormalities: Decreased step clearance; Foot drop; Steppage gait; Step to gait              Speed/Fernanda: Slow  Step Length: Left shortened;Right shortened  Swing Pattern: Left asymmetrical       Therapeutic Exercises:   PROM- DF  Pain Rating:      Activity Tolerance:   Good    After treatment patient left in no apparent distress:   Sitting in chair and Call bell within reach    COMMUNICATION/COLLABORATION:   The patients plan of care was discussed with: Registered nurse.      Mildred Oliva PTA   Time Calculation: 17 mins

## 2022-03-10 NOTE — PROGRESS NOTES
Bedside and Verbal shift change report given to Maggie (oncoming nurse) by Marina Haque (offgoing nurse). Report included the following information SBAR, Kardex, Procedure Summary, Intake/Output and MAR.

## 2022-03-10 NOTE — PROGRESS NOTES
Problem: Mobility Impaired (Adult and Pediatric)  Goal: *Acute Goals and Plan of Care (Insert Text)  Description: FUNCTIONAL STATUS PRIOR TO ADMISSION: Patient was independent and active without use of DME.    HOME SUPPORT PRIOR TO ADMISSION: The patient lived with adult granddaughter but did not require assist.    Physical Therapy Goals  Initiated 3/9/2022    1. Patient will move from supine to sit and sit to supine , scoot up and down, and roll side to side in bed with modified independence utilizing log roll technique within 4 days. 2. Patient will perform sit to stand with modified independence within 4 days. 3. Patient will ambulate with modified independence for 150 feet with the least restrictive device within 4 days. 4. Patient will ascend/descend 4 stairs with one handrail(s) with modified independence within 4 days. 5. Patient will verbalize and demonstrate understanding of spinal precautions (No bending, lifting greater than 5 lbs, or twisting; log-roll technique; frequent repositioning as instructed) within 4 days. 3/10/2022 1411 by Mildred Oliva  Outcome: Progressing Towards Goal   PHYSICAL THERAPY AFTERNOON SESSION NOTE  Patient: Rene Fernandez (78 y.o. female)  Date: 3/10/2022  Primary Diagnosis: Status post surgery [Z98.890]  S/P lumbar fusion [Z98.1]  Procedure(s) (LRB):  L4-S1 OBLIQUE LATERAL LUMBAR INTERBODY FUSION (N/A) 2 Days Post-Op   Precautions: Fall,Spinal    Rene Fernandez was seen for afternoon PT session. She is progressing gait distance and walking approx 50 Ft w/ the RW and CGA. Continues to display Left foot drop. This afternoon, noted steppage gait pattern decreased as pt leading amb w/ LLE vs RLE compared to this AM. Continue to expect Rene Fernandez will need rehab (IPR) prior to discharging to prior place of residence. Will continue to follow for further assessment and progression of mobility as able and appropriate.      Afternoon session functional mobility:  Bed Mobility:     Supine to Sit: Supervision; Additional time;Bed Modified  Sit to Supine:  (remained sitting in chair)     Transfers:  Sit to Stand: Contact guard assistance;Assist x1  Stand to Sit: Contact guard assistance        Bed to Chair: Contact guard assistance              Balance:   Sitting: Intact  Standing: Impaired; With support  Standing - Static: Constant support; Fair  Standing - Dynamic : Constant support; Fair  Ambulation/Gait Training:  Distance (ft): 50 Feet (ft)  Assistive Device: Brace/Splint;Gait belt;Walker, rolling  Ambulation - Level of Assistance: Contact guard assistance;Assist x1        Gait Abnormalities: Decreased step clearance; Foot drop; Step to gait (steppage gait improved; pt leading gait w/ LLE vs RLE)              Speed/Fernanda: Slow  Step Length: Left shortened;Right shortened  Swing Pattern: Left asymmetrical          Thank you,  Mildred Oliva,PTA   Time Calculation: 12 mins

## 2022-03-10 NOTE — PROGRESS NOTES
Music Therapy Assessment  [address] [place] [id number]     1945  78 y.o.  female    Patient Telephone Number: [phone] (home)   Cheondoism Affiliation: Yarsani   Language: English   Patient Active Problem List    Diagnosis Date Noted    Status post surgery 03/08/2022    S/P lumbar fusion 03/08/2022    Advance directive discussed with patient 04/26/2017    DDD (degenerative disc disease), cervical 11/01/2016    H/O bone density study 12/29/2015    Hyperlipidemia 04/27/2015    Vitamin D deficiency 04/27/2015    S/P endoscopy 11/25/2014    Screen for colon cancer 05/03/2012    History of screening mammography 05/03/2012    S/P arthroscopy of shoulder 11/03/2011    S/P cholecystectomy 11/03/2011    S/P LUCA-BSO 11/03/2011    S/P hemorrhoidectomy 11/03/2011    Neuropathy 11/03/2011    Hiatal hernia 10/17/2011    History of seasonal allergies 10/17/2011    Depression 10/17/2011    Anxiety 10/17/2011    Osteoarthritis 10/17/2011    Genital herpes 10/17/2011        Date: 3/10/2022            Total Time (in minutes): 35          [facility] 5S1 ORTHO JOINT    Mental Status:   [x] Alert [  ] [place] [  ]  Confused  [  ] Minimally responsive  [  ] Sleeping    Communication Status: [  ] Impaired Speech [  ] Nonverbal -N/A    Physical Status:   [  ] Oxygen in use  [  ] Hard of Hearing [  ] Vision Impaired  [  ] Ambulatory  [  ] Ambulatory with assistance [  ] Non-ambulatory -N/A    Music Preferences, Background: Pt enjoys a variety of genres of music, she said in part due to her late spouse playing several instruments and in different bands over time. Specific genres she said she likes include Folk and [employer] in a folk style, Yarsani hymns, Popular music from the 1970's and 80's including the Commodores, Cora Reason, [address], [facility]. Clinical Problem addressed: Support self-expression, spiritual support.     Goal(s) met in session:  Physical/Pain management (Scale of 1-10): Pre-session rating: Pt denied pain. Post-session rating: Pt didn't report on pain. [  ] Increased relaxation   [  ] Affected breathing patterns  [  ] Decreased muscle tension   [  ] Decreased agitation  [  ] Affected heart rate    [  ] Increased alertness     Emotional/Psychological:  [x] Increased self-expression   [  ] Decreased aggressive behavior   [  ] Decreased feelings of stress  [x] Discussed healthy coping skills     [  ] Improved mood    [  ] Decreased withdrawn behavior     Social:  [  ] Decreased feelings of isolation/loneliness [x] Positive social interaction   [  ] Provided support and/or comfort for family/friends    Spiritual:  [x] Spiritual support    [  ] Expressed peace  [x] Expressed alaina    [x] Discussed beliefs    Techniques Utilized (Check all that apply):   [  ] Procedural support MT [  ] Music for relaxation [x] Patient preferred music  [  ] Shweta analysis  [  ] Song choice  [  ] Music for validation  [  ] Entrainment  [  ] Movement to music [  ] Guided visualization  [  ] Zofia Guerra  [  ] Patient instrument playing [  ] [place] writing  [  ] Cherylle  along   [  ] Reinaldo Peak  [  ] Sensory stimulation  [x] Active Listening  [x] Music for spiritual support [  ] Making of CDs as gifts    Session Observations:  F/up visit; Patient (pt) was alert lying in bed. After asking the pt how she was feeling, this music therapist (MT) asked pt what kind of music she'd like to hear today. Pt shared more about her music background and then chose to hear an Appies song. MT sang and played with hilaria Mackenzie Fuentes. Pt's affect was bright as she listened. Following this love song, the MT acknowledged that yesterday was the anniversary of the pt's late 's death. Pt said he  young, in his 42's. She said each year when the anniversary of his death is approaching she tries to forget about it, but each year something happens that reminds her of him.  This year, it was the [employer] and [employer] that MT sang and played for her yesterday. Pt shared about her spirituality and beliefs about life and death and MT provided active listening. Pt shared about her late spouse and how he was an accomplished musician and served in the [employer]. She went on to share about her granddaughter and her family. Pt then chose to hear an Liya Ing song. MT sang and played with hilaria Renee. Pt's affect was bright again as she listened. She shared about dreaming as a young adult of traveling to the [place], and six years ago she lived this dream. MT provided active listening and words of support as pt shared about this experience. Pt shared more about her alaina and how this helps her cope with the multiple losses of family members that she's had in her life. She thanked MT for the session and expressed enjoyment in it.     MARIA ESTHER PisanoBC (Music Therapist-Board Certified)  Spiritual Care Department  Referral-based service

## 2022-03-10 NOTE — PROGRESS NOTES
Problem: Falls - Risk of  Goal: *Absence of Falls  Description: Document Wilda Mendes Fall Risk and appropriate interventions in the flowsheet.   Outcome: Progressing Towards Goal  Note: Fall Risk Interventions:  Mobility Interventions: Communicate number of staff needed for ambulation/transfer,Patient to call before getting OOB,PT Consult for mobility concerns,PT Consult for assist device competence         Medication Interventions: Evaluate medications/consider consulting pharmacy,Teach patient to arise slowly,Patient to call before getting OOB    Elimination Interventions: Call light in reach,Elevated toilet seat,Patient to call for help with toileting needs,Stay With Me (per policy),Toilet paper/wipes in reach,Toileting schedule/hourly rounds    History of Falls Interventions: Evaluate medications/consider consulting pharmacy,Assess for delayed presentation/identification of injury for 48 hrs (comment for end date)         Problem: Simple Spine Procedure:  Post Op Day 1/Day of Discharge  Goal: Activity/Safety  Outcome: Progressing Towards Goal  Goal: Nutrition/Diet  Outcome: Progressing Towards Goal  Goal: Discharge Planning  Outcome: Progressing Towards Goal  Goal: Medications  Outcome: Progressing Towards Goal  Goal: Respiratory  Outcome: Progressing Towards Goal  Goal: Treatments/Interventions/Procedures  Outcome: Progressing Towards Goal  Goal: Psychosocial  Outcome: Progressing Towards Goal

## 2022-03-11 ENCOUNTER — APPOINTMENT (OUTPATIENT)
Dept: VASCULAR SURGERY | Age: 77
DRG: 455 | End: 2022-03-11
Attending: PHYSICIAN ASSISTANT
Payer: MEDICARE

## 2022-03-11 VITALS
HEIGHT: 65 IN | DIASTOLIC BLOOD PRESSURE: 70 MMHG | BODY MASS INDEX: 31.15 KG/M2 | HEART RATE: 92 BPM | WEIGHT: 186.95 LBS | SYSTOLIC BLOOD PRESSURE: 155 MMHG | OXYGEN SATURATION: 98 % | RESPIRATION RATE: 16 BRPM | TEMPERATURE: 98.2 F

## 2022-03-11 PROCEDURE — 74011250637 HC RX REV CODE- 250/637: Performed by: PHYSICIAN ASSISTANT

## 2022-03-11 PROCEDURE — 93970 EXTREMITY STUDY: CPT

## 2022-03-11 PROCEDURE — 74011000250 HC RX REV CODE- 250: Performed by: PHYSICIAN ASSISTANT

## 2022-03-11 RX ORDER — ADHESIVE BANDAGE
30 BANDAGE TOPICAL ONCE
Status: COMPLETED | OUTPATIENT
Start: 2022-03-11 | End: 2022-03-11

## 2022-03-11 RX ORDER — FACIAL-BODY WIPES
10 EACH TOPICAL ONCE
Status: COMPLETED | OUTPATIENT
Start: 2022-03-11 | End: 2022-03-11

## 2022-03-11 RX ADMIN — ACETAMINOPHEN 1000 MG: 500 TABLET ORAL at 12:20

## 2022-03-11 RX ADMIN — SENNOSIDES AND DOCUSATE SODIUM 1 TABLET: 50; 8.6 TABLET ORAL at 09:21

## 2022-03-11 RX ADMIN — GABAPENTIN 300 MG: 300 CAPSULE ORAL at 09:21

## 2022-03-11 RX ADMIN — OXYCODONE 10 MG: 5 TABLET ORAL at 03:38

## 2022-03-11 RX ADMIN — OXYCODONE 5 MG: 5 TABLET ORAL at 09:22

## 2022-03-11 RX ADMIN — BISACODYL 10 MG: 10 SUPPOSITORY RECTAL at 14:40

## 2022-03-11 RX ADMIN — LISINOPRIL 5 MG: 5 TABLET ORAL at 07:13

## 2022-03-11 RX ADMIN — MAGNESIUM HYDROXIDE 30 ML: 400 SUSPENSION ORAL at 12:21

## 2022-03-11 RX ADMIN — POLYETHYLENE GLYCOL 3350 17 G: 17 POWDER, FOR SOLUTION ORAL at 09:22

## 2022-03-11 RX ADMIN — OXYCODONE 5 MG: 5 TABLET ORAL at 12:23

## 2022-03-11 RX ADMIN — PANTOPRAZOLE SODIUM 40 MG: 40 TABLET, DELAYED RELEASE ORAL at 07:13

## 2022-03-11 RX ADMIN — ATORVASTATIN CALCIUM 20 MG: 10 TABLET, FILM COATED ORAL at 09:21

## 2022-03-11 RX ADMIN — SODIUM CHLORIDE, PRESERVATIVE FREE 10 ML: 5 INJECTION INTRAVENOUS at 06:00

## 2022-03-11 RX ADMIN — ACETAMINOPHEN 1000 MG: 500 TABLET ORAL at 05:59

## 2022-03-11 RX ADMIN — OXYCODONE 5 MG: 5 TABLET ORAL at 16:23

## 2022-03-11 NOTE — PROGRESS NOTES
Physical Therapy  3/11/2022    Chart reviewed. Attempted to see pt this morning, however pt reporting increased soreness, especially in Left hip. Pt declined all levels of mobility this morning. She also reports she is to d/c to [facility] after 3pm. Will f/u at later time as able and appropriate this afternoon.      Mildred Means, PTA

## 2022-03-11 NOTE — PROGRESS NOTES
Spine Surgery Progress Note  Javan Berkowitz PA-C    Admit Date: 3/8/2022   LOS: 3 days      Daily Progress Note: 3/11/2022    POD:3 Day Post-Op    S/P: Procedure(s):  L4-S1 OBLIQUE LATERAL LUMBAR INTERBODY FUSION    Subjective:     Ms. Kesha Fish is a very pleasant 70yo female with a PMH of HTN, GERD, anxiety, depression, dyslipidemia. She underwent L4-S1 OLLIF surgery on 3/8/22 for low back pain with radiculopathy. Workup prior to surgery included an MRI that revealed \"severe canal stenosis L4-L5\" & \"bilateral neural foraminal stenosis L4-S1\". She tolerated the procedure well. On POD#3, patient continues to do well. Pain is well-controlled though it is increased. Patient is voiding without issue. +flatus. No BM yet. Continued left foot drop. Patient endorses doing a lot yesterday; she is very sore. Left calf is sore from foot pumps and trying to work left ankle. Pt denies chest pain, nausea, vomiting, difficulty swallowing, headache, and dyspnea. Pt resting comfortably in bed. Objective:     Vital signs  Temp (24hrs), Av.9 °F (36.6 °C), Min:97.7 °F (36.5 °C), Max:98.4 °F (36.9 °C)   No intake/output data recorded. No intake/output data recorded.     Visit Vitals  /67   Pulse 91   Temp 97.8 °F (36.6 °C)   Resp 16   Ht 5' 4.5\" (1.638 m)   Wt 84.8 kg (186 lb 15.2 oz)   SpO2 98%   BMI 31.59 kg/m²    O2 Flow Rate (L/min): 2 l/min O2 Device: Nasal cannula     Pain control  Pain Assessment  Pain Scale 1: Numeric (0 - 10)  Pain Intensity 1: 7  Pain Location 1: Back  Pain Orientation 1: Left  Pain Description 1: Aching  Pain Intervention(s) 1: Medication (see MAR)    PT/OT  Gait     Gait  Speed/Fernanda: Slow  Step Length: Left shortened,Right shortened  Swing Pattern: Left asymmetrical  Gait Abnormalities: Decreased step clearance,Foot drop,Step to gait (steppage gait improved; pt leading gait w/ LLE vs RLE)  Ambulation - Level of Assistance: Contact guard assistance,Assist x1  Distance (ft): 50 Feet (ft)  Assistive Device: Brace/Splint,Gait belt,Walker, rolling        Physical Exam:  Gen: No acute distress. Neuro: A&Ox3. Follows commands. Speech clear. Affect normal.  CUELLAR spontaneously. Strength 5/5 in BUE and RLE. 5/5 strength LLE except at ankle; 5/5 plantarflexion; 1-2/5 dorsiflexion; EHL muscle fibers activating. Wiggles toes bilat. Sensation intact. Gait deferred. Calves soft and supple; No pain with passive stretch  Skin: Incisions C/D/I    24 hour results:    No results found for this or any previous visit (from the past 24 hour(s)).      Assessment:     Active Problems:    Status post surgery (3/8/2022)      S/P lumbar fusion (3/8/2022)      Plan:     s/p L4-S1 OLLIF  -PT/OT - in lumbar brace    -Pain control - scheduled tylenol, gabapentin, prn oxycodone, valium   -Regular diet   -Hgb stable   -Bowel regimen; add suppository today, +/- milk of mag   -CM consult for IPR   -Dupplex bilat LE to r/o DVT    Readiness for discharge:     [x] Vital Signs stable    [x] Hgb stable    [x] + Voiding    [x] Wound intact, drainage minimal    [x] Tolerating PO intake     [] Cleared by PT (OT if applicable)    [x] Adequate pain control on oral medication alone     Activity: up with assist  DVT ppx: SCDs  Dispo: pending IPR placement    Plan d/w JAZMIN Bryant

## 2022-03-11 NOTE — DISCHARGE SUMMARY
Spine Discharge Summary    Patient ID:  Debra Kohler  [id number]  female  78 y.o.  1945    Admit date: 3/8/2022    Discharge date: 3/11/2022    Admitting Physician: Marquis Rogelio MD     Consulting Physician(s):   Treatment Team: Attending Provider: Randy Schultz MD; Utilization Review: Philippe Garay; Care Manager: Sandrita Barnett RN    Date of Surgery:   3/8/2022     Preoperative Diagnosis:  LUMBAR RADICULOPATHY    Postoperative Diagnosis:   LUMBAR RADICULOPATHY    Procedure(s):  L4-S1 OBLIQUE LATERAL LUMBAR INTERBODY FUSION     Anesthesia Type:   General     Surgeon: Randy Schultz MD                            HPI:  Pt is a 78 y.o. female who has a history of LUMBAR RADICULOPATHY  with pain and limitations of activities of daily living who presents at this time for a L4-S1 OBLIQUE LATERAL LUMBAR INTERBODY FUSION  following the failure of conservative management. PMH:   Past Medical History:   Diagnosis Date    Arthritis     B12 deficiency     Chronic pain     LOWER BACK    DDD (degenerative disc disease), cervical 11/2016    GERD (gastroesophageal reflux disease)     History of screening mammography 5/3/2012    Lumbar radiculopathy     Lumbar spondylosis     Lumbar stenosis     MGUS (monoclonal gammopathy of unknown significance)     Neuropathy 11/3/2011    Peripheral neuropathy     S/P arthroscopy of shoulder 11/3/2011    S/P cholecystectomy 11/3/2011    S/P hemorrhoidectomy 11/3/2011    S/P LUCA-BSO 11/3/2011    TMJ pain dysfunction syndrome     Unspecified adverse effect of anesthesia     loss of appetite after shoulder surgery       Body mass index is 31.59 kg/m². : A BMI > 30 is classified as obesity and > 40 is classified as morbid obesity. Medications upon admission :   Prior to Admission Medications   Prescriptions Last Dose Informant Patient Reported? Taking?    CARBOXYMETHYLCELLULOSE SODIUM (REFRESH OP) Unknown at Unknown time  Yes No   Sig: Administer 2 Drops to both eyes as needed. Cholecalciferol, Vitamin D3, (VITAMIN D3) 2,000 unit cap capsule 3/1/2022 at Unknown time  Yes Yes   Sig: Take 2,000 Units by mouth daily. acetaminophen (Tylenol Extra Strength) 500 mg tablet 3/5/2022  Yes No   Sig: Take 1,000 mg by mouth every six (6) hours as needed for Pain. amitriptyline (ELAVIL) 75 mg tablet 3/7/2022 at Unknown time  Yes Yes   Sig: Take 75 mg by mouth nightly. atorvastatin (LIPITOR) 20 mg tablet 3/7/2022 at Unknown time  Yes Yes   Sig: Take 20 mg by mouth Every morning. buPROPion SR (WELLBUTRIN SR) 150 mg SR tablet 3/7/2022 at Unknown time  Yes Yes   Sig: Take 150 mg by mouth nightly. cyanocobalamin (VITAMIN B12) 1,000 mcg/mL injection 2022 at Unknown time  Yes Yes   Si,000 mcg every month. diclofenac EC (VOLTAREN) 75 mg EC tablet 3/1/2022 at Unknown time  Yes Yes   Sig: Take 75 mg by mouth two (2) times a day. famotidine (Pepcid) 20 mg tablet 3/7/2022 at Unknown time  Yes Yes   Sig: Take 20 mg by mouth nightly. fluticasone propionate (Children's Flonase Allergy Rlf) 50 mcg/actuation nasal spray 3/8/2022 at 0400  Yes Yes   Si [place] by Both Nostrils route daily. lisinopril (PRINIVIL, ZESTRIL) 5 mg tablet 3/8/2022 at 0400  Yes Yes   Sig: Take 5 mg by mouth Every morning. omeprazole (PRILOSEC) 40 mg capsule 3/7/2022 at Unknown time  Yes Yes   Sig: Take 40 mg by mouth Every morning. valACYclovir (VALTREX) 1 gram tablet Unknown at Unknown time  No No   Sig: Take 0.5 Tabs by mouth two (2) times a day. Patient taking differently: Take 500 mg by mouth as needed. Facility-Administered Medications: None        Allergies: Allergies   Allergen Reactions    Lactose Other (comments)     \"BLOATING \"        Hospital Course: The patient underwent surgery. Complications:  None; patient tolerated the procedure well. Was taken to the PACU in stable condition and then transferred to the ortho floor.       Perioperative Antibiotics:  Ancef Postoperative Pain Management:  Oxycodone & Tylenol     Postoperative transfusions:    Number of units banked PRBCs =   none     Post Op complications: none    Hemoglobin at discharge:    Lab Results   Component Value Date/Time    HGB 10.0 (L) 03/10/2022 03:47 AM    INR 1.0 03/01/2022 11:46 AM       Dressing was changed on POD # 1. Incision - clean, dry and intact. No significant erythema or swelling. Wound appears to be healing without any evidence of infection. Neurovascular exam found to be within normal limits except for new left foot drop. Physical Therapy started following surgery and participated in bed mobility, transfers and ambulation. Gait:  Gait  Speed/Fernanda: Slow  Step Length: Left shortened,Right shortened  Swing Pattern: Left asymmetrical  Gait Abnormalities: Decreased step clearance,Foot drop,Step to gait (steppage gait improved; pt leading gait w/ LLE vs RLE)  Ambulation - Level of Assistance: Contact guard assistance,Assist x1  Distance (ft): 50 Feet (ft)  Assistive Device: Brace/Splint,Gait belt,Walker, rolling                   Discharged to: IPR    Condition on Discharge:   good    Discharge instructions:  - Take pain medications as prescribed  - Resume pre hospital diet      - Discharge activity: activity as tolerated  - Ambulate as tolerated  - Avoid bending, lifting and twisting  - Lumbar brace when oob  - Wound Care Keep wound clean and dry. See discharge instruction sheet. -DISCHARGE MEDICATION LIST     Current Discharge Medication List      START taking these medications    Details   senna-docusate (PERICOLACE) 8.6-50 mg per tablet Take 1 Tablet by mouth two (2) times a day. Qty: 30 Tablet, Refills: 0  Start date: 3/9/2022      oxyCODONE-acetaminophen (PERCOCET) 5-325 mg per tablet Take 1 Tablet by mouth every four (4) hours as needed for Pain for up to 7 days. Max Daily Amount: 6 Tablets.   Qty: 40 Tablet, Refills: 0  Start date: 3/9/2022, End date: 3/16/2022 Associated Diagnoses: S/P lumbar fusion         CONTINUE these medications which have NOT CHANGED    Details   amitriptyline (ELAVIL) 75 mg tablet Take 75 mg by mouth nightly. buPROPion SR (WELLBUTRIN SR) 150 mg SR tablet Take 150 mg by mouth nightly. fluticasone propionate (Children's Flonase Allergy Rlf) 50 mcg/actuation nasal spray 1 [place] by Both Nostrils route daily. famotidine (Pepcid) 20 mg tablet Take 20 mg by mouth nightly. atorvastatin (LIPITOR) 20 mg tablet Take 20 mg by mouth Every morning. cyanocobalamin (VITAMIN B12) 1,000 mcg/mL injection 1,000 mcg every month. lisinopril (PRINIVIL, ZESTRIL) 5 mg tablet Take 5 mg by mouth Every morning. omeprazole (PRILOSEC) 40 mg capsule Take 40 mg by mouth Every morning. Cholecalciferol, Vitamin D3, (VITAMIN D3) 2,000 unit cap capsule Take 2,000 Units by mouth daily. acetaminophen (Tylenol Extra Strength) 500 mg tablet Take 1,000 mg by mouth every six (6) hours as needed for Pain. valACYclovir (VALTREX) 1 gram tablet Take 0.5 Tabs by mouth two (2) times a day. Qty: 90 Tab, Refills: 0      CARBOXYMETHYLCELLULOSE SODIUM (REFRESH OP) Administer 2 Drops to both eyes as needed.          STOP taking these medications       diclofenac EC (VOLTAREN) 75 mg EC tablet Comments:   Reason for Stopping:            per medical continuation form      -Follow up in office in 2 weeks      Signed:  JAZMIN Bellamy     3/11/2022  2:24 PM

## 2022-03-11 NOTE — PROGRESS NOTES
Bedside shift change report given to [id number] (oncoming nurse) by Jr Quijano (offgoing nurse). Report included the following information SBAR, Kardex, Intake/Output and MAR.

## 2022-03-11 NOTE — PROGRESS NOTES
Low Back Pain H & P    Chief Complaint:  Patient presents with:  Low Back Pain: Here with c/o back and pain to both legs.        HPI:  Rupa Brown MD is a 56year old year old right handed female with a prior history of low back pain for which I la ZAYNAB: The patient plans to discharge to [facility] today with family to transport after 3pm. RN to complete EMTALA and call report to: [phone]. The patient is going to room 102. Ortho PA agrees with plan. RUR: 3%    CM received call from Ari causey ([phone]) today and she can accept the patient today after 3pm. CM following for discharge needs.     Deborah Witt RN/CRM REMOVAL OF HARDWARE FOOT Right 5/19/2020    Performed by Yuko Welch MD at [address]   • TUBAL LIGATION         Family History   Family History   Problem Relation Age of Onset   • Diabetes Father    • Hypertension Father    • Cancer F Topics      Concerns:         Service: Not Asked        Blood Transfusions: Not Asked        Caffeine Concern: No        Occupational Exposure: Not Asked        Hobby Hazards: Not Asked        Sleep Concern: Not Asked        Stress Concern: Not Ask Resp: 16       Gait:    Gait: Normal gait   Sit to Stand: no difficulty   RIGHT Walking on Toes: no difficulty   LEFT Walking on Toes: no difficulty   RIGHT Walking on Heels: no difficulty   LEFT Walking on Heels: no difficulty     Lumbar Spine:    Scoli test-RIGHT Negative for pain   Slump test-LEFT Negative for pain     Lymph Nodes:   Inguinal Lymph Nodes Absent     Special Tests Lumbar Spine:  Lying prone and Prone press up decreases the patient's symptoms.   The patient performs RIGHT single leg squats

## 2022-03-11 NOTE — PROGRESS NOTES
TRANSFER - OUT REPORT:    Verbal report given to Lance RN(name) on Truong Kurtz  being transferred to [facility] (unit) for routine progression of care       Report consisted of patients Situation, Background, Assessment and   Recommendations(SBAR). Information from the following report(s) SBAR, Kardex, Intake/Output and MAR was reviewed with the receiving nurse. Lines:   Peripheral IV 03/08/22 Posterior;Right Hand (Active)   Site Assessment Clean, dry, & intact 03/11/22 0911   Phlebitis Assessment 0 03/11/22 0911   Infiltration Assessment 0 03/11/22 0911   Dressing Status Clean, dry, & intact 03/11/22 0911   Dressing Type Transparent 03/11/22 0911   Hub Color/Line Status Capped 03/11/22 0911   Action Taken Open ports on tubing capped 03/11/22 0050   Alcohol Cap Used Yes 03/10/22 0857        Opportunity for questions and clarification was provided.       Patient transported with:

## 2022-03-11 NOTE — PROGRESS NOTES
Bedside and Verbal shift change report given to Gerald Saucedo (oncoming nurse) by Remigio Larios (offgoing nurse). Report included the following information SBAR, Kardex, Procedure Summary, Intake/Output and MAR.

## 2022-03-18 PROBLEM — Z71.89 ADVANCE DIRECTIVE DISCUSSED WITH PATIENT: Status: ACTIVE | Noted: 2017-04-26

## 2022-03-19 PROBLEM — Z98.1 S/P LUMBAR FUSION: Status: ACTIVE | Noted: 2022-03-08

## 2022-03-19 PROBLEM — Z98.890 STATUS POST SURGERY: Status: ACTIVE | Noted: 2022-03-08

## 2022-03-21 ENCOUNTER — TRANSCRIBE ORDER (OUTPATIENT)
Dept: SCHEDULING | Age: 77
End: 2022-03-21

## 2022-03-21 ENCOUNTER — HOSPITAL ENCOUNTER (OUTPATIENT)
Dept: MRI IMAGING | Age: 77
Discharge: HOME | End: 2022-03-21
Attending: PHYSICAL MEDICINE & REHABILITATION
Payer: COMMERCIAL

## 2022-03-21 DIAGNOSIS — I10 BENIGN ESSENTIAL HTN: Primary | ICD-10-CM

## 2022-03-21 DIAGNOSIS — R00.0 RAPID HEART BEAT: ICD-10-CM

## 2022-03-21 DIAGNOSIS — M51.86 CALCIFICATION OF INTERVERTEBRAL CARTILAGE OR DISC OF LUMBAR REGION: ICD-10-CM

## 2022-03-21 DIAGNOSIS — G62.9 NEUROPATHY: ICD-10-CM

## 2022-03-21 DIAGNOSIS — M51.86 CALCIFICATION OF INTERVERTEBRAL CARTILAGE OR DISC OF LUMBAR REGION: Primary | ICD-10-CM

## 2022-03-21 DIAGNOSIS — R29.6 REPEATED FALLS: ICD-10-CM

## 2022-03-21 PROCEDURE — 99231 SBSQ HOSP IP/OBS SF/LOW 25: CPT | Performed by: INTERNAL MEDICINE

## 2022-03-21 PROCEDURE — 74011250636 HC RX REV CODE- 250/636

## 2022-03-21 PROCEDURE — 72158 MRI LUMBAR SPINE W/O & W/DYE: CPT

## 2022-03-21 PROCEDURE — A9576 INJ PROHANCE MULTIPACK: HCPCS

## 2022-03-21 RX ADMIN — GADOTERIDOL 17 ML: 279.3 INJECTION, SOLUTION INTRAVENOUS at 15:35

## 2022-04-26 ENCOUNTER — TRANSCRIBE ORDER (OUTPATIENT)
Dept: SCHEDULING | Age: 77
End: 2022-04-26

## 2022-04-26 DIAGNOSIS — M79.89 LEFT LEG SWELLING: Primary | ICD-10-CM

## 2022-05-09 ENCOUNTER — HOSPITAL ENCOUNTER (OUTPATIENT)
Dept: VASCULAR SURGERY | Age: 77
Discharge: HOME | End: 2022-05-09
Attending: INTERNAL MEDICINE
Payer: MEDICARE

## 2022-05-09 DIAGNOSIS — M79.89 LEFT LEG SWELLING: ICD-10-CM

## 2022-05-09 PROCEDURE — 93971 EXTREMITY STUDY: CPT

## 2022-06-10 ENCOUNTER — APPOINTMENT (OUTPATIENT)
Dept: CT IMAGING | Age: 77
DRG: 378 | End: 2022-06-10
Attending: STUDENT IN AN ORGANIZED HEALTH CARE EDUCATION/TRAINING PROGRAM
Payer: MEDICARE

## 2022-06-10 ENCOUNTER — HOSPITAL ENCOUNTER (EMERGENCY)
Age: 77
Discharge: ANOTHER HOSPITAL | End: 2022-06-10
Attending: EMERGENCY MEDICINE
Payer: MEDICARE

## 2022-06-10 ENCOUNTER — HOSPITAL ENCOUNTER (INPATIENT)
Age: 77
LOS: 4 days | Discharge: HOME | DRG: 378 | End: 2022-06-14
Attending: EMERGENCY MEDICINE | Admitting: STUDENT IN AN ORGANIZED HEALTH CARE EDUCATION/TRAINING PROGRAM
Payer: MEDICARE

## 2022-06-10 VITALS
SYSTOLIC BLOOD PRESSURE: 115 MMHG | RESPIRATION RATE: 17 BRPM | BODY MASS INDEX: 31.11 KG/M2 | OXYGEN SATURATION: 99 % | HEIGHT: 65 IN | TEMPERATURE: 98.7 F | DIASTOLIC BLOOD PRESSURE: 54 MMHG | HEART RATE: 88 BPM

## 2022-06-10 DIAGNOSIS — K92.2 GASTROINTESTINAL HEMORRHAGE, UNSPECIFIED GASTROINTESTINAL HEMORRHAGE TYPE: ICD-10-CM

## 2022-06-10 DIAGNOSIS — D64.9 SYMPTOMATIC ANEMIA: ICD-10-CM

## 2022-06-10 DIAGNOSIS — K92.2 GASTROINTESTINAL HEMORRHAGE, UNSPECIFIED GASTROINTESTINAL HEMORRHAGE TYPE: Primary | ICD-10-CM

## 2022-06-10 DIAGNOSIS — D64.9 SYMPTOMATIC ANEMIA: Primary | ICD-10-CM

## 2022-06-10 LAB
ALBUMIN SERPL-MCNC: 3 G/DL (ref 3.5–5)
ALBUMIN/GLOB SERPL: 0.9 {RATIO} (ref 1.1–2.2)
ALP SERPL-CCNC: 121 U/L (ref 45–117)
ALT SERPL-CCNC: 17 U/L (ref 12–78)
ANION GAP SERPL CALC-SCNC: 8 MMOL/L (ref 5–15)
AST SERPL-CCNC: 19 U/L (ref 15–37)
BILIRUB SERPL-MCNC: 0.3 MG/DL (ref 0.2–1)
BUN SERPL-MCNC: 9 MG/DL (ref 6–20)
BUN/CREAT SERPL: 9 (ref 12–20)
CALCIUM SERPL-MCNC: 8.4 MG/DL (ref 8.5–10.1)
CHLORIDE SERPL-SCNC: 105 MMOL/L (ref 97–108)
CO2 SERPL-SCNC: 27 MMOL/L (ref 21–32)
CREAT SERPL-MCNC: 0.99 MG/DL (ref 0.55–1.02)
FERRITIN SERPL-MCNC: 12 NG/ML (ref 8–252)
GLOBULIN SER CALC-MCNC: 3.2 G/DL (ref 2–4)
GLUCOSE SERPL-MCNC: 86 MG/DL (ref 65–100)
HAPTOGLOB SERPL-MCNC: 198 MG/DL (ref 30–200)
HISTORY CHECKED?,CKHIST: NORMAL
HISTORY CHECKED?,CKHIST: NORMAL
LDH SERPL L TO P-CCNC: 158 U/L (ref 81–246)
POTASSIUM SERPL-SCNC: 3.7 MMOL/L (ref 3.5–5.1)
PROT SERPL-MCNC: 6.2 G/DL (ref 6.4–8.2)
RETICS # AUTO: 0.06 M/UL (ref 0.02–0.08)
RETICS/RBC NFR AUTO: 3.5 % (ref 0.7–2.1)
SODIUM SERPL-SCNC: 140 MMOL/L (ref 136–145)
TROPONIN-HIGH SENSITIVITY: 23 NG/L (ref 0–51)
TSH SERPL DL<=0.05 MIU/L-ACNC: 1.21 UIU/ML (ref 0.36–3.74)

## 2022-06-10 PROCEDURE — 93005 ELECTROCARDIOGRAM TRACING: CPT

## 2022-06-10 PROCEDURE — 86923 COMPATIBILITY TEST ELECTRIC: CPT

## 2022-06-10 PROCEDURE — 99285 EMERGENCY DEPT VISIT HI MDM: CPT

## 2022-06-10 PROCEDURE — 36415 COLL VENOUS BLD VENIPUNCTURE: CPT

## 2022-06-10 PROCEDURE — 65270000046 HC RM TELEMETRY

## 2022-06-10 PROCEDURE — C9113 INJ PANTOPRAZOLE SODIUM, VIA: HCPCS | Performed by: NURSE PRACTITIONER

## 2022-06-10 PROCEDURE — P9016 RBC LEUKOCYTES REDUCED: HCPCS

## 2022-06-10 PROCEDURE — 83615 LACTATE (LD) (LDH) ENZYME: CPT

## 2022-06-10 PROCEDURE — 85045 AUTOMATED RETICULOCYTE COUNT: CPT

## 2022-06-10 PROCEDURE — 86900 BLOOD TYPING SEROLOGIC ABO: CPT

## 2022-06-10 PROCEDURE — 84443 ASSAY THYROID STIM HORMONE: CPT

## 2022-06-10 PROCEDURE — 96374 THER/PROPH/DIAG INJ IV PUSH: CPT

## 2022-06-10 PROCEDURE — 74178 CT ABD&PLV WO CNTR FLWD CNTR: CPT

## 2022-06-10 PROCEDURE — 82728 ASSAY OF FERRITIN: CPT

## 2022-06-10 PROCEDURE — 83010 ASSAY OF HAPTOGLOBIN QUANT: CPT

## 2022-06-10 PROCEDURE — 74011000258 HC RX REV CODE- 258: Performed by: NURSE PRACTITIONER

## 2022-06-10 PROCEDURE — 74011000250 HC RX REV CODE- 250: Performed by: STUDENT IN AN ORGANIZED HEALTH CARE EDUCATION/TRAINING PROGRAM

## 2022-06-10 PROCEDURE — 74011250636 HC RX REV CODE- 250/636: Performed by: STUDENT IN AN ORGANIZED HEALTH CARE EDUCATION/TRAINING PROGRAM

## 2022-06-10 PROCEDURE — 74011000258 HC RX REV CODE- 258: Performed by: STUDENT IN AN ORGANIZED HEALTH CARE EDUCATION/TRAINING PROGRAM

## 2022-06-10 PROCEDURE — 74011250636 HC RX REV CODE- 250/636: Performed by: NURSE PRACTITIONER

## 2022-06-10 PROCEDURE — C9113 INJ PANTOPRAZOLE SODIUM, VIA: HCPCS | Performed by: STUDENT IN AN ORGANIZED HEALTH CARE EDUCATION/TRAINING PROGRAM

## 2022-06-10 PROCEDURE — 74011000636 HC RX REV CODE- 636: Performed by: STUDENT IN AN ORGANIZED HEALTH CARE EDUCATION/TRAINING PROGRAM

## 2022-06-10 PROCEDURE — 80053 COMPREHEN METABOLIC PANEL: CPT

## 2022-06-10 PROCEDURE — 84484 ASSAY OF TROPONIN QUANT: CPT

## 2022-06-10 PROCEDURE — 85025 COMPLETE CBC W/AUTO DIFF WBC: CPT

## 2022-06-10 RX ORDER — SODIUM CHLORIDE 9 MG/ML
500 INJECTION, SOLUTION INTRAVENOUS CONTINUOUS
Status: DISCONTINUED | OUTPATIENT
Start: 2022-06-10 | End: 2022-06-10 | Stop reason: HOSPADM

## 2022-06-10 RX ORDER — ACETAMINOPHEN 325 MG/1
650 TABLET ORAL
Status: DISCONTINUED | OUTPATIENT
Start: 2022-06-10 | End: 2022-06-14 | Stop reason: HOSPADM

## 2022-06-10 RX ORDER — SODIUM CHLORIDE 9 MG/ML
250 INJECTION, SOLUTION INTRAVENOUS AS NEEDED
Status: DISCONTINUED | OUTPATIENT
Start: 2022-06-10 | End: 2022-06-14 | Stop reason: HOSPADM

## 2022-06-10 RX ORDER — ACETAMINOPHEN 650 MG/1
650 SUPPOSITORY RECTAL
Status: DISCONTINUED | OUTPATIENT
Start: 2022-06-10 | End: 2022-06-14 | Stop reason: HOSPADM

## 2022-06-10 RX ORDER — ONDANSETRON 4 MG/1
4 TABLET, ORALLY DISINTEGRATING ORAL
Status: DISCONTINUED | OUTPATIENT
Start: 2022-06-10 | End: 2022-06-14 | Stop reason: HOSPADM

## 2022-06-10 RX ORDER — ATORVASTATIN CALCIUM 20 MG/1
20 TABLET, FILM COATED ORAL EVERY MORNING
Status: DISCONTINUED | OUTPATIENT
Start: 2022-06-11 | End: 2022-06-14 | Stop reason: HOSPADM

## 2022-06-10 RX ORDER — SODIUM CHLORIDE 0.9 % (FLUSH) 0.9 %
5-40 SYRINGE (ML) INJECTION EVERY 8 HOURS
Status: DISCONTINUED | OUTPATIENT
Start: 2022-06-10 | End: 2022-06-14 | Stop reason: HOSPADM

## 2022-06-10 RX ORDER — POLYETHYLENE GLYCOL 3350 17 G/17G
17 POWDER, FOR SOLUTION ORAL DAILY PRN
Status: DISCONTINUED | OUTPATIENT
Start: 2022-06-10 | End: 2022-06-14 | Stop reason: HOSPADM

## 2022-06-10 RX ORDER — ONDANSETRON 2 MG/ML
4 INJECTION INTRAMUSCULAR; INTRAVENOUS
Status: DISCONTINUED | OUTPATIENT
Start: 2022-06-10 | End: 2022-06-14 | Stop reason: HOSPADM

## 2022-06-10 RX ORDER — BUPROPION HYDROCHLORIDE 150 MG/1
150 TABLET, EXTENDED RELEASE ORAL
Status: DISCONTINUED | OUTPATIENT
Start: 2022-06-10 | End: 2022-06-14 | Stop reason: HOSPADM

## 2022-06-10 RX ORDER — SODIUM CHLORIDE 9 MG/ML
250 INJECTION, SOLUTION INTRAVENOUS AS NEEDED
Status: DISCONTINUED | OUTPATIENT
Start: 2022-06-10 | End: 2022-06-10 | Stop reason: HOSPADM

## 2022-06-10 RX ORDER — SODIUM CHLORIDE 9 MG/ML
75 INJECTION, SOLUTION INTRAVENOUS CONTINUOUS
Status: DISCONTINUED | OUTPATIENT
Start: 2022-06-10 | End: 2022-06-12

## 2022-06-10 RX ORDER — SODIUM CHLORIDE 0.9 % (FLUSH) 0.9 %
5-40 SYRINGE (ML) INJECTION AS NEEDED
Status: DISCONTINUED | OUTPATIENT
Start: 2022-06-10 | End: 2022-06-14 | Stop reason: HOSPADM

## 2022-06-10 RX ADMIN — PANTOPRAZOLE SODIUM 8 MG/HR: 40 INJECTION, POWDER, FOR SOLUTION INTRAVENOUS at 22:29

## 2022-06-10 RX ADMIN — IOPAMIDOL 100 ML: 755 INJECTION, SOLUTION INTRAVENOUS at 21:46

## 2022-06-10 RX ADMIN — SODIUM CHLORIDE, PRESERVATIVE FREE 10 ML: 5 INJECTION INTRAVENOUS at 21:58

## 2022-06-10 RX ADMIN — SODIUM CHLORIDE 500 ML: 9 INJECTION, SOLUTION INTRAVENOUS at 18:52

## 2022-06-10 RX ADMIN — PANTOPRAZOLE SODIUM 40 MG: 40 INJECTION, POWDER, LYOPHILIZED, FOR SOLUTION INTRAVENOUS at 18:38

## 2022-06-10 RX ADMIN — SODIUM CHLORIDE 75 ML/HR: 9 INJECTION, SOLUTION INTRAMUSCULAR; INTRAVENOUS; SUBCUTANEOUS at 21:51

## 2022-06-10 RX ADMIN — SODIUM CHLORIDE 40 MG: 9 INJECTION, SOLUTION INTRAMUSCULAR; INTRAVENOUS; SUBCUTANEOUS at 21:52

## 2022-06-10 NOTE — ED TRIAGE NOTES
Triage Note: Patient arrives to ER for abnormal lab results. Patient was called by her hematologist for a \"drasitcally low hemoglobin. \" Unsure of value. Sees Dr. Garrett Peace with oncology. Not currently on any chemo. Complains of lightheaded and dizziness.

## 2022-06-10 NOTE — ED PROVIDER NOTES
EMERGENCY DEPARTMENT HISTORY AND PHYSICAL EXAM    Date: 6/10/2022  Patient Name: Sd Guzman    History of Presenting Illness     Chief Complaint   Patient presents with    Abnormal Lab Results         History Provided By: Patient    Chief Complaint: abnormal lab results  Duration:onset today  Timing:  Acute  Quality: H& H very low  Severity: Severe  Modifying Factors: none  Associated Symptoms: fatigue      HPI: Sd Guzman is a 78 y.o. female with a PMH of spondylosis B12 deficiency who presents from her doctor's office today stating that her lab results were abnormal.  States that her hemoglobin hematocrit was low. States that the doctor told her to come immediately to the ER. There are no modifying factors. Associated symptoms include fatigue. Patient denies shortness of breath chest pain rectal bleeding . She reports history of stomach ulcers several years ago. PCP: Emilia Brown MD    Current Outpatient Medications   Medication Sig Dispense Refill    senna-docusate (PERICOLACE) 8.6-50 mg per tablet Take 1 Tablet by mouth two (2) times a day. 30 Tablet 0    amitriptyline (ELAVIL) 75 mg tablet Take 75 mg by mouth nightly.  buPROPion SR (WELLBUTRIN SR) 150 mg SR tablet Take 150 mg by mouth nightly.  fluticasone propionate (Children's Flonase Allergy Rlf) 50 mcg/actuation nasal spray 1 [place] by Both Nostrils route daily.  acetaminophen (Tylenol Extra Strength) 500 mg tablet Take 1,000 mg by mouth every six (6) hours as needed for Pain.  famotidine (Pepcid) 20 mg tablet Take 20 mg by mouth nightly.  atorvastatin (LIPITOR) 20 mg tablet Take 20 mg by mouth Every morning.  cyanocobalamin (VITAMIN B12) 1,000 mcg/mL injection 1,000 mcg every month.  lisinopril (PRINIVIL, ZESTRIL) 5 mg tablet Take 5 mg by mouth Every morning.  valACYclovir (VALTREX) 1 gram tablet Take 0.5 Tabs by mouth two (2) times a day.  (Patient taking differently: Take 500 mg by mouth as needed.) 90 Tab 0    omeprazole (PRILOSEC) 40 mg capsule Take 40 mg by mouth Every morning.  Cholecalciferol, Vitamin D3, (VITAMIN D3) 2,000 unit cap capsule Take 2,000 Units by mouth daily.  CARBOXYMETHYLCELLULOSE SODIUM (REFRESH OP) Administer 2 Drops to both eyes as needed.          Past History     Past Medical History:  Past Medical History:   Diagnosis Date    Arthritis     B12 deficiency     Chronic pain     LOWER BACK    DDD (degenerative disc disease), cervical 11/2016    GERD (gastroesophageal reflux disease)     History of screening mammography 5/3/2012    Lumbar radiculopathy     Lumbar spondylosis     Lumbar stenosis     MGUS (monoclonal gammopathy of unknown significance)     Neuropathy 11/3/2011    Peripheral neuropathy     S/P arthroscopy of shoulder 11/3/2011    S/P cholecystectomy 11/3/2011    S/P hemorrhoidectomy 11/3/2011    S/P LUCA-BSO 11/3/2011    TMJ pain dysfunction syndrome     Unspecified adverse effect of anesthesia     loss of appetite after shoulder surgery       Past Surgical History:  Past Surgical History:   Procedure Laterality Date    COLONOSCOPY N/A 6/25/2019    COL[facility] performed by Ross Roland [facility] at [facility] ENDOSCOPY    ENDOSCOPY, COLON, DIAGNOSTIC  2001    normal ([facility])    HX CHOLECYSTECTOMY      HX COLOSTOMY  1985    BOWEL NICKED DURING HYSTERECTOMY REQUIRING COLOSTOMY    HX COLOSTOMY TAKE DOWN  1985    HX HYSTERECTOMY      HX SHOULDER REPLACEMENT Bilateral        Family History:  Family History   Problem Relation Age of Onset    Heart Disease Mother     Cancer Mother         liver    Hypertension Father     Stroke Father     Cancer Sister         colon    Cancer Brother         pancreatic    Breast Cancer Daughter 39    Breast Cancer Niece 61    Other Son         Dec drug OD    Anesth Problems Neg Hx        Social History:  Social History     Tobacco Use    Smoking status: Former Smoker     Packs/day: 0.25     Years: 35.00 Pack years: 8.75     Types: Cigarettes     Quit date: 3/1/2021     Years since quittin.2    Smokeless tobacco: Never Used   Vaping Use    Vaping Use: Never used   Substance Use Topics    Alcohol use: Yes     Comment: rare    Drug use: No       Allergies: Allergies   Allergen Reactions    Lactose Other (comments)     \"BLOATING \"         Review of Systems   Review of Systems   Constitutional: Negative for fatigue and fever. Respiratory: Negative for shortness of breath and wheezing. Cardiovascular: Negative for chest pain. Gastrointestinal: Negative for abdominal pain. Musculoskeletal: Negative for arthralgias, myalgias, neck pain and neck stiffness. Skin: Negative for pallor and rash. Neurological: Negative for dizziness, tremors and headaches. All other systems reviewed and are negative. Physical Exam     Vitals:    06/10/22 1628 06/10/22 1841   BP: (!) 115/42 (!) 115/54   Pulse: 97 88   Resp: 16 17   Temp: 98.7 °F (37.1 °C)    SpO2: 100% 99%   Height: 5' 5\" (1.651 m)      Physical Exam  Vitals and nursing note reviewed. Constitutional:       General: She is not in acute distress. Appearance: She is well-developed and normal weight. Comments: Pale 68-year-old female   HENT:      Head: Normocephalic and atraumatic. Right Ear: External ear normal.      Left Ear: External ear normal.      Nose: Nose normal.      Mouth/Throat:      Mouth: Mucous membranes are moist.   Eyes:      Conjunctiva/sclera: Conjunctivae normal.   Cardiovascular:      Rate and Rhythm: Normal rate and regular rhythm. Heart sounds: Normal heart sounds. Pulmonary:      Effort: Pulmonary effort is normal. No respiratory distress. Breath sounds: Normal breath sounds. No wheezing. Abdominal:      General: Bowel sounds are normal.      Palpations: Abdomen is soft. Tenderness: There is no abdominal tenderness. Musculoskeletal:         General: Normal range of motion.       Cervical back: Normal range of motion and neck supple. Lymphadenopathy:      Cervical: No cervical adenopathy. Skin:     General: Skin is warm and dry. Findings: No rash. Neurological:      Mental Status: She is alert and oriented to person, place, and time. Cranial Nerves: No cranial nerve deficit. Coordination: Coordination normal.   Psychiatric:         Behavior: Behavior normal.         Thought Content: Thought content normal.         Judgment: Judgment normal.           Diagnostic Study Results     Labs -     Recent Results (from the past 12 hour(s))   CBC WITH AUTOMATED DIFF    Collection Time: 06/10/22  5:12 PM   Result Value Ref Range    WBC 6.8 3.6 - 11.0 K/uL    RBC 1.78 (L) 3.80 - 5.20 M/uL    HGB 3.6 (LL) 11.5 - 16.0 g/dL    HCT 13.2 (LL) 35.0 - 47.0 %    MCV 74.2 (L) 80.0 - 99.0 FL    MCH 20.2 (L) 26.0 - 34.0 PG    MCHC 27.3 (L) 30.0 - 36.5 g/dL    RDW 19.7 (H) 11.5 - 14.5 %    PLATELET 824 (H) 128 - 400 K/uL    MPV 9.2 8.9 - 12.9 FL    NRBC 0.7 (H) 0  WBC    ABSOLUTE NRBC 0.05 (H) 0.00 - 0.01 K/uL    NEUTROPHILS 42 32 - 75 %    LYMPHOCYTES 44 12 - 49 %    MONOCYTES 10 5 - 13 %    EOSINOPHILS 3 0 - 7 %    BASOPHILS 0 0 - 1 %    IMMATURE GRANULOCYTES 1 %    ABS. NEUTROPHILS 2.9 1.8 - 8.0 K/UL    ABS. LYMPHOCYTES 2.9 0.8 - 3.5 K/UL    ABS. MONOCYTES 0.7 0.0 - 1.0 K/UL    ABS. EOSINOPHILS 0.2 0.0 - 0.4 K/UL    ABS. BASOPHILS 0.0 0.0 - 0.1 K/UL    ABS. IMM.  GRANS. 0.1 K/UL    DF SMEAR SCANNED      RBC COMMENTS HYPOCHROMIA  2+       METABOLIC PANEL, COMPREHENSIVE    Collection Time: 06/10/22  5:12 PM   Result Value Ref Range    Sodium 140 136 - 145 mmol/L    Potassium 3.7 3.5 - 5.1 mmol/L    Chloride 105 97 - 108 mmol/L    CO2 27 21 - 32 mmol/L    Anion gap 8 5 - 15 mmol/L    Glucose 86 65 - 100 mg/dL    BUN 9 6 - 20 MG/DL    Creatinine 0.99 0.55 - 1.02 MG/DL    BUN/Creatinine ratio 9 (L) 12 - 20      GFR est AA >60 >60 ml/min/1.73m2    GFR est non-AA 55 (L) >60 ml/min/1.73m2    Calcium 8.4 (L) 8.5 - 10.1 MG/DL    Bilirubin, total 0.3 0.2 - 1.0 MG/DL    ALT (SGPT) 17 12 - 78 U/L    AST (SGOT) 19 15 - 37 U/L    Alk. phosphatase 121 (H) 45 - 117 U/L    Protein, total 6.2 (L) 6.4 - 8.2 g/dL    Albumin 3.0 (L) 3.5 - 5.0 g/dL    Globulin 3.2 2.0 - 4.0 g/dL    A-G Ratio 0.9 (L) 1.1 - 2.2     RBC, ALLOCATE    Collection Time: 06/10/22  6:00 PM   Result Value Ref Range    HISTORY CHECKED? Historical check performed    TROPONIN-HIGH SENSITIVITY    Collection Time: 06/10/22  6:37 PM   Result Value Ref Range    Troponin-High Sensitivity 23 0 - 51 ng/L       Radiologic Studies -   No orders to display     CT Results  (Last 48 hours)    None        CXR Results  (Last 48 hours)    None            Medical Decision Making   I am the first provider for this patient. I reviewed the vital signs, available nursing notes, past medical history, past surgical history, family history and social history. Vital Signs-Reviewed the patient's vital signs. Records Reviewed: Nursing Notes and Previous Laboratory Studies    Provider Notes (Medical Decision Making):   51-year-old female presents with low H&H will order labs order for blood transfusion  ED Course as of 06/10/22 2037   Fri Georges 10, 2022   1835 Hemoglobin is 3.6 hematocrit is 13.2 patient will need to be transferred stool for occult blood is positive patient requested transfer to [facility]. States her gastroenterologist Dr. Stuart Sepulveda is in there and she has been seeing him for a long time. Patient admits to previous history of peptic ulcer disease and states \"my stomach has been bothering me really badly lately\". Patient agrees to the transfer.   There are no beds available at this time will transfer patient to the emergency department at [address] [place] [address] Patient has  been accepted by Dr. Ludwig Dubois at [facility] emergency department [AN]      ED Course User Index  [AN] Melissa Valdez NP            Procedures:  Procedures    Please note that this dictation was completed with Dragon, computer voice recognition software. Quite often unanticipated grammatical, syntax, homophones, and other interpretive errors are inadvertently transcribed by the computer software. Please disregard these errors. Additionally, please excuse any errors that have escaped final proofreading. Diagnosis     Clinical Impression:   1. Gastrointestinal hemorrhage, unspecified gastrointestinal hemorrhage type    2.  Symptomatic anemia

## 2022-06-10 NOTE — ED NOTES
Ultrasound IV by Sonny Barry RN :  Procedure Note    Patient meets criteria for US IV insertion. Ultrasound IV education provided to patient. Opportunities for questions given. IV ultrasound technique used for PIV placement:  20gauge 1.75 in BD Nexiva  Left upper arm  location. 1 X Attempt(s). Flushed with ease; vigorous blood return. Procedure tolerated well. Primary RN aware of IV placement and added to LDA.                                 Sonny Barry RN

## 2022-06-10 NOTE — ED NOTES
Writer attempted to call report to [address] are extremely busy\". Need for expedient transport of patient explained.   Continue to await ability to provide patient hand off

## 2022-06-10 NOTE — ED NOTES
Emergency Department Nursing Plan of Care       The Nursing Plan of Care is developed from the Nursing assessment and Emergency Department Attending provider initial evaluation. The plan of care may be reviewed in the ED Provider note.     The Plan of Care was developed with the following considerations:   Patient / Family readiness to learn indicated by:verbalized understanding  Persons(s) to be included in education: patient  Barriers to Learning/Limitations:No    Signed     Santino Wray RN    6/10/2022   5:01 PM

## 2022-06-11 ENCOUNTER — ANESTHESIA (OUTPATIENT)
Dept: SURGERY | Age: 77
DRG: 378 | End: 2022-06-11
Payer: MEDICARE

## 2022-06-11 ENCOUNTER — ANESTHESIA EVENT (OUTPATIENT)
Dept: SURGERY | Age: 77
DRG: 378 | End: 2022-06-11
Payer: MEDICARE

## 2022-06-11 LAB
ALBUMIN SERPL-MCNC: 2.7 G/DL (ref 3.5–5)
ALBUMIN/GLOB SERPL: 1 {RATIO} (ref 1.1–2.2)
ALP SERPL-CCNC: 109 U/L (ref 45–117)
ALT SERPL-CCNC: 12 U/L (ref 12–78)
ANION GAP SERPL CALC-SCNC: 6 MMOL/L (ref 5–15)
AST SERPL-CCNC: 14 U/L (ref 15–37)
BASOPHILS # BLD: 0 K/UL (ref 0–0.1)
BASOPHILS NFR BLD: 0 % (ref 0–1)
BILIRUB SERPL-MCNC: 1.6 MG/DL (ref 0.2–1)
BUN SERPL-MCNC: 8 MG/DL (ref 6–20)
BUN/CREAT SERPL: 12 (ref 12–20)
CALCIUM SERPL-MCNC: 8.2 MG/DL (ref 8.5–10.1)
CHLORIDE SERPL-SCNC: 109 MMOL/L (ref 97–108)
CO2 SERPL-SCNC: 25 MMOL/L (ref 21–32)
CREAT SERPL-MCNC: 0.68 MG/DL (ref 0.55–1.02)
DIFFERENTIAL METHOD BLD: ABNORMAL
EOSINOPHIL # BLD: 0.2 K/UL (ref 0–0.4)
EOSINOPHIL NFR BLD: 2 % (ref 0–7)
ERYTHROCYTE [DISTWIDTH] IN BLOOD BY AUTOMATED COUNT: 18 % (ref 11.5–14.5)
GLOBULIN SER CALC-MCNC: 2.8 G/DL (ref 2–4)
GLUCOSE SERPL-MCNC: 87 MG/DL (ref 65–100)
HCT VFR BLD AUTO: 22.5 % (ref 35–47)
HGB BLD-MCNC: 7 G/DL (ref 11.5–16)
HISTORY CHECKED?,CKHIST: NORMAL
IMM GRANULOCYTES # BLD AUTO: 0.1 K/UL (ref 0–0.04)
IMM GRANULOCYTES NFR BLD AUTO: 1 % (ref 0–0.5)
LYMPHOCYTES # BLD: 1.8 K/UL (ref 0.8–3.5)
LYMPHOCYTES NFR BLD: 26 % (ref 12–49)
MCH RBC QN AUTO: 24.6 PG (ref 26–34)
MCHC RBC AUTO-ENTMCNC: 31.1 G/DL (ref 30–36.5)
MCV RBC AUTO: 79.2 FL (ref 80–99)
MONOCYTES # BLD: 0.5 K/UL (ref 0–1)
MONOCYTES NFR BLD: 7 % (ref 5–13)
NEUTS SEG # BLD: 4.5 K/UL (ref 1.8–8)
NEUTS SEG NFR BLD: 64 % (ref 32–75)
NRBC # BLD: 0.04 K/UL (ref 0–0.01)
NRBC BLD-RTO: 0.6 PER 100 WBC
PERIPHERAL SMEAR,PSM: NORMAL
PLATELET # BLD AUTO: 468 K/UL (ref 150–400)
PMV BLD AUTO: 9 FL (ref 8.9–12.9)
POTASSIUM SERPL-SCNC: 3.5 MMOL/L (ref 3.5–5.1)
PROT SERPL-MCNC: 5.5 G/DL (ref 6.4–8.2)
RBC # BLD AUTO: 2.84 M/UL (ref 3.8–5.2)
SODIUM SERPL-SCNC: 140 MMOL/L (ref 136–145)
WBC # BLD AUTO: 7.1 K/UL (ref 3.6–11)

## 2022-06-11 PROCEDURE — 74011250636 HC RX REV CODE- 250/636: Performed by: STUDENT IN AN ORGANIZED HEALTH CARE EDUCATION/TRAINING PROGRAM

## 2022-06-11 PROCEDURE — C9113 INJ PANTOPRAZOLE SODIUM, VIA: HCPCS | Performed by: STUDENT IN AN ORGANIZED HEALTH CARE EDUCATION/TRAINING PROGRAM

## 2022-06-11 PROCEDURE — 74011000258 HC RX REV CODE- 258: Performed by: STUDENT IN AN ORGANIZED HEALTH CARE EDUCATION/TRAINING PROGRAM

## 2022-06-11 PROCEDURE — 74011250636 HC RX REV CODE- 250/636: Performed by: NURSE ANESTHETIST, CERTIFIED REGISTERED

## 2022-06-11 PROCEDURE — 76210000063 HC OR PH I REC FIRST 0.5 HR: Performed by: SPECIALIST

## 2022-06-11 PROCEDURE — 30233N1 TRANSFUSION OF NONAUTOLOGOUS RED BLOOD CELLS INTO PERIPHERAL VEIN, PERCUTANEOUS APPROACH: ICD-10-PCS | Performed by: SPECIALIST

## 2022-06-11 PROCEDURE — 76010000154 HC OR TIME FIRST 0.5 HR: Performed by: SPECIALIST

## 2022-06-11 PROCEDURE — P9016 RBC LEUKOCYTES REDUCED: HCPCS

## 2022-06-11 PROCEDURE — 74011000250 HC RX REV CODE- 250: Performed by: NURSE ANESTHETIST, CERTIFIED REGISTERED

## 2022-06-11 PROCEDURE — 2709999900 HC NON-CHARGEABLE SUPPLY: Performed by: SPECIALIST

## 2022-06-11 PROCEDURE — 97161 PT EVAL LOW COMPLEX 20 MIN: CPT

## 2022-06-11 PROCEDURE — 36430 TRANSFUSION BLD/BLD COMPNT: CPT

## 2022-06-11 PROCEDURE — 88305 TISSUE EXAM BY PATHOLOGIST: CPT

## 2022-06-11 PROCEDURE — 36415 COLL VENOUS BLD VENIPUNCTURE: CPT

## 2022-06-11 PROCEDURE — 76060000031 HC ANESTHESIA FIRST 0.5 HR: Performed by: SPECIALIST

## 2022-06-11 PROCEDURE — 65270000046 HC RM TELEMETRY

## 2022-06-11 PROCEDURE — 85025 COMPLETE CBC W/AUTO DIFF WBC: CPT

## 2022-06-11 PROCEDURE — 80053 COMPREHEN METABOLIC PANEL: CPT

## 2022-06-11 PROCEDURE — 97530 THERAPEUTIC ACTIVITIES: CPT

## 2022-06-11 PROCEDURE — 0DB68ZX EXCISION OF STOMACH, VIA NATURAL OR ARTIFICIAL OPENING ENDOSCOPIC, DIAGNOSTIC: ICD-10-PCS | Performed by: SPECIALIST

## 2022-06-11 PROCEDURE — 74011000250 HC RX REV CODE- 250: Performed by: STUDENT IN AN ORGANIZED HEALTH CARE EDUCATION/TRAINING PROGRAM

## 2022-06-11 PROCEDURE — 88342 IMHCHEM/IMCYTCHM 1ST ANTB: CPT

## 2022-06-11 PROCEDURE — 74011250637 HC RX REV CODE- 250/637: Performed by: STUDENT IN AN ORGANIZED HEALTH CARE EDUCATION/TRAINING PROGRAM

## 2022-06-11 RX ORDER — PROPOFOL 10 MG/ML
INJECTION, EMULSION INTRAVENOUS AS NEEDED
Status: DISCONTINUED | OUTPATIENT
Start: 2022-06-11 | End: 2022-06-11 | Stop reason: HOSPADM

## 2022-06-11 RX ORDER — SODIUM CHLORIDE 9 MG/ML
INJECTION, SOLUTION INTRAVENOUS
Status: DISCONTINUED | OUTPATIENT
Start: 2022-06-11 | End: 2022-06-11 | Stop reason: HOSPADM

## 2022-06-11 RX ORDER — SODIUM CHLORIDE 9 MG/ML
250 INJECTION, SOLUTION INTRAVENOUS AS NEEDED
Status: DISCONTINUED | OUTPATIENT
Start: 2022-06-11 | End: 2022-06-14 | Stop reason: HOSPADM

## 2022-06-11 RX ORDER — LIDOCAINE HYDROCHLORIDE 20 MG/ML
INJECTION, SOLUTION INFILTRATION; PERINEURAL AS NEEDED
Status: DISCONTINUED | OUTPATIENT
Start: 2022-06-11 | End: 2022-06-11 | Stop reason: HOSPADM

## 2022-06-11 RX ADMIN — LIDOCAINE HYDROCHLORIDE 40 MG: 20 INJECTION, SOLUTION INFILTRATION; PERINEURAL at 16:28

## 2022-06-11 RX ADMIN — BUPROPION HYDROCHLORIDE 150 MG: 150 TABLET, EXTENDED RELEASE ORAL at 21:04

## 2022-06-11 RX ADMIN — PANTOPRAZOLE SODIUM 8 MG/HR: 40 INJECTION, POWDER, FOR SOLUTION INTRAVENOUS at 17:27

## 2022-06-11 RX ADMIN — PROPOFOL 25 MG: 10 INJECTION, EMULSION INTRAVENOUS at 16:34

## 2022-06-11 RX ADMIN — SODIUM CHLORIDE: 900 INJECTION, SOLUTION INTRAVENOUS at 16:19

## 2022-06-11 RX ADMIN — ATORVASTATIN CALCIUM 20 MG: 20 TABLET, FILM COATED ORAL at 08:00

## 2022-06-11 RX ADMIN — PROPOFOL 50 MG: 10 INJECTION, EMULSION INTRAVENOUS at 16:31

## 2022-06-11 RX ADMIN — SODIUM CHLORIDE, PRESERVATIVE FREE 10 ML: 5 INJECTION INTRAVENOUS at 17:27

## 2022-06-11 RX ADMIN — PANTOPRAZOLE SODIUM 8 MG/HR: 40 INJECTION, POWDER, FOR SOLUTION INTRAVENOUS at 11:03

## 2022-06-11 RX ADMIN — SODIUM CHLORIDE 75 ML/HR: 9 INJECTION, SOLUTION INTRAMUSCULAR; INTRAVENOUS; SUBCUTANEOUS at 11:03

## 2022-06-11 RX ADMIN — PROPOFOL 25 MG: 10 INJECTION, EMULSION INTRAVENOUS at 16:38

## 2022-06-11 RX ADMIN — PROPOFOL 75 MG: 10 INJECTION, EMULSION INTRAVENOUS at 16:30

## 2022-06-11 RX ADMIN — PROPOFOL 25 MG: 10 INJECTION, EMULSION INTRAVENOUS at 16:36

## 2022-06-11 NOTE — ROUTINE PROCESS
Patient: Jj Pastor MRN: [id number]  SSN: [id number]   YOB: 1945  Age: 78 y.o. Sex: female     Patient is status post Procedure(s):  ESOPHAGOGASTRODUODENOSCOPY (EGD) with biopsy. Surgeon(s) and Role:     Elin Washington MD - Primary    Local/Dose/Irrigation:                 Peripheral IV 06/10/22 Left Antecubital (Active)   Site Assessment Clean, dry, & intact 06/11/22 1306   Phlebitis Assessment 0 06/11/22 1306   Infiltration Assessment 0 06/11/22 1306   Dressing Status Clean, dry, & intact 06/11/22 1306   Dressing Type Transparent;Tape 06/11/22 1306   Hub Color/Line Status Pink; Infusing 06/11/22 1306   Action Taken Open ports on tubing capped 06/11/22 1306   Alcohol Cap Used Yes 06/11/22 1306                           Dressing/Packing:       Splint/Cast:  ]    Other:

## 2022-06-11 NOTE — PROGRESS NOTES
TRANSFER - OUT REPORT:    Verbal report given to YUSEF Lui(name) on [facility]  being transferred to [facility]) for ordered procedure       Report consisted of patients Situation, Background, Assessment and   Recommendations(SBAR). Information from the following report(s) SBAR, Kardex, ED Summary, Procedure Summary, Intake/Output, MAR, Recent Results and Cardiac Rhythm NSR was reviewed with the receiving nurse. Lines:   Peripheral IV 06/10/22 Left Antecubital (Active)   Site Assessment Clean, dry, & intact 06/11/22 1306   Phlebitis Assessment 0 06/11/22 1306   Infiltration Assessment 0 06/11/22 1306   Dressing Status Clean, dry, & intact 06/11/22 1306   Dressing Type Transparent;Tape 06/11/22 1306   Hub Color/Line Status Pink; Infusing 06/11/22 1306   Action Taken Open ports on tubing capped 06/11/22 1306   Alcohol Cap Used Yes 06/11/22 1306        Opportunity for questions and clarification was provided.       Patient transported with:   Registered Nurse

## 2022-06-11 NOTE — PROGRESS NOTES
1715: Patient received from PACU. EGD done. 2000: Bedside and Verbal shift change report given to YSUEF Frederick (oncoming nurse) by Moreno Macias (offgoing nurse). Report included the following information SBAR, Kardex, ED Summary, Procedure Summary, Intake/Output, Recent Results and Cardiac Rhythm NSR.

## 2022-06-11 NOTE — ANESTHESIA POSTPROCEDURE EVALUATION
Procedure(s):  ESOPHAGOGASTRODUODENOSCOPY (EGD) with biopsy. MAC    Anesthesia Post Evaluation        Patient location during evaluation: PACU  Patient participation: complete - patient participated  Level of consciousness: awake and alert  Pain management: adequate  Airway patency: patent  Anesthetic complications: no  Cardiovascular status: acceptable  Respiratory status: acceptable  Hydration status: acceptable  Comments: I have seen and evaluated the patient and is ready for discharge. Betsy Moreau MD    Post anesthesia nausea and vomiting:  none      INITIAL Post-op Vital signs:   Vitals Value Taken Time   /58 06/11/22 1655   Temp 37.2 °C (99 °F) 06/11/22 1645   Pulse 95 06/11/22 1658   Resp 18 06/11/22 1658   SpO2 97 % 06/11/22 1658   Vitals shown include unvalidated device data.

## 2022-06-11 NOTE — PERIOP NOTES
TRANSFER - IN REPORT:    Verbal report received from YUSEF Slater(name) on Lety Arias  being received from 6W(unit) for ordered procedure      Report consisted of patients Situation, Background, Assessment and   Recommendations(SBAR). Information from the following report(s) SBAR, Kardex, Procedure Summary, Intake/Output, MAR and Recent Results was reviewed with the receiving nurse. Opportunity for questions and clarification was provided. Assessment completed upon patients arrival to unit and care assumed.

## 2022-06-11 NOTE — PROGRESS NOTES
Hospitalist Progress Note      Hospital summary: 78 y.o lady w/ HTN, MGUS, PUD ( w/ H. Pylori), who was transferred from [facility] ED for severe anemia. Assessment/Plan:  Acute blood loss anemia: due to GIB, Hgb 3.6  -s/p 3 U PRBC  -IV NS  -continue IV PPI  -monitor H/H, transfuse for Hgb<7  -GI consulted, plan for EGD    HTN: holding home meds    Hyperlipidemia  History of migraines  MDD  MGUS    Code status: full  DVT prophylaxis: SCDs  Disposition: TBD  ----------------------------------------------    CC: f/u anemia    S: feels better after her transfusions, denies dizziness, dyspnea, chest pain, abdominal pain, n/v or diarrhea     Review of Systems:  Pertinent items are noted in HPI.     O:  Visit Vitals  BP (!) 132/57 (BP 1 Location: Right upper arm, BP Patient Position: Semi fowlers)   Pulse 89   Temp 98.2 °F (36.8 °C)   Resp 19   Ht 5' (1.524 m)   Wt 79.4 kg (175 lb)   SpO2 94%   BMI 34.18 kg/m²       PHYSICAL EXAM:  Gen: NAD, non-toxic  HEENT: anicteric sclerae, pale conjunctiva, oropharynx clear, MM moist  Neck: supple, trachea midline, no adenopathy  Heart: RRR, systolic murmur, no JVD, no peripheral edema  Lungs: CTA b/l, non-labored respirations  Abd: soft, NT, ND, BS+  Extr: warm  Skin: dry, no rash  Neuro: CN II-XII grossly intact, normal speech, moves all extremities  Psych: normal mood, appropriate affect      Intake/Output Summary (Last 24 hours) at 6/11/2022 1236  Last data filed at 6/11/2022 0828  Gross per 24 hour   Intake 1273.3 ml   Output --   Net 1273.3 ml        Recent labs & imaging reviewed:  Recent Results (from the past 24 hour(s))   CBC WITH AUTOMATED DIFF    Collection Time: 06/10/22  5:12 PM   Result Value Ref Range    WBC 6.8 3.6 - 11.0 K/uL    RBC 1.78 (L) 3.80 - 5.20 M/uL    HGB 3.6 (LL) 11.5 - 16.0 g/dL    HCT 13.2 (LL) 35.0 - 47.0 %    MCV 74.2 (L) 80.0 - 99.0 FL    MCH 20.2 (L) 26.0 - 34.0 PG    MCHC 27.3 (L) 30.0 - 36.5 g/dL    RDW 19.7 (H) 11.5 - 14.5 % PLATELET 551 (H) 654 - 400 K/uL    MPV 9.2 8.9 - 12.9 FL    NRBC 0.7 (H) 0  WBC    ABSOLUTE NRBC 0.05 (H) 0.00 - 0.01 K/uL    NEUTROPHILS 42 32 - 75 %    LYMPHOCYTES 44 12 - 49 %    MONOCYTES 10 5 - 13 %    EOSINOPHILS 3 0 - 7 %    BASOPHILS 0 0 - 1 %    IMMATURE GRANULOCYTES 1 %    ABS. NEUTROPHILS 2.9 1.8 - 8.0 K/UL    ABS. LYMPHOCYTES 2.9 0.8 - 3.5 K/UL    ABS. MONOCYTES 0.7 0.0 - 1.0 K/UL    ABS. EOSINOPHILS 0.2 0.0 - 0.4 K/UL    ABS. BASOPHILS 0.0 0.0 - 0.1 K/UL    ABS. IMM. GRANS. 0.1 K/UL    DF SMEAR SCANNED      RBC COMMENTS HYPOCHROMIA  2+       METABOLIC PANEL, COMPREHENSIVE    Collection Time: 06/10/22  5:12 PM   Result Value Ref Range    Sodium 140 136 - 145 mmol/L    Potassium 3.7 3.5 - 5.1 mmol/L    Chloride 105 97 - 108 mmol/L    CO2 27 21 - 32 mmol/L    Anion gap 8 5 - 15 mmol/L    Glucose 86 65 - 100 mg/dL    BUN 9 6 - 20 MG/DL    Creatinine 0.99 0.55 - 1.02 MG/DL    BUN/Creatinine ratio 9 (L) 12 - 20      GFR est AA >60 >60 ml/min/1.73m2    GFR est non-AA 55 (L) >60 ml/min/1.73m2    Calcium 8.4 (L) 8.5 - 10.1 MG/DL    Bilirubin, total 0.3 0.2 - 1.0 MG/DL    ALT (SGPT) 17 12 - 78 U/L    AST (SGOT) 19 15 - 37 U/L    Alk. phosphatase 121 (H) 45 - 117 U/L    Protein, total 6.2 (L) 6.4 - 8.2 g/dL    Albumin 3.0 (L) 3.5 - 5.0 g/dL    Globulin 3.2 2.0 - 4.0 g/dL    A-G Ratio 0.9 (L) 1.1 - 2.2     RBC, ALLOCATE    Collection Time: 06/10/22  6:00 PM   Result Value Ref Range    HISTORY CHECKED? Historical check performed    TROPONIN-HIGH SENSITIVITY    Collection Time: 06/10/22  6:37 PM   Result Value Ref Range    Troponin-High Sensitivity 23 0 - 51 ng/L   RBC, ALLOCATE    Collection Time: 06/10/22  8:45 PM   Result Value Ref Range    HISTORY CHECKED?  Historical check performed    TYPE & SCREEN    Collection Time: 06/10/22  9:05 PM   Result Value Ref Range    Crossmatch Expiration 06/13/2022,2359     ABO/Rh(D) O POSITIVE     Antibody screen NEG     Unit number [id number]     Blood component type RC LR,2     Unit division 00     Status of unit TRANSFUSED     Crossmatch result Compatible     Unit number [id number]     Blood component type RC LR     Unit division 00     Status of unit ISSUED     Crossmatch result Compatible     Unit number [id number]     Blood component type RC LR     Unit division 00     Status of unit ISSUED     Crossmatch result Compatible     Unit number [id number]     Blood component type RC LR,2     Unit division 00     Status of unit ALLOCATED     Crossmatch result Compatible    FERRITIN    Collection Time: 06/10/22 10:34 PM   Result Value Ref Range    Ferritin 12 8 - 252 NG/ML   TSH 3RD GENERATION    Collection Time: 06/10/22 10:34 PM   Result Value Ref Range    TSH 1.21 0.36 - 3.74 uIU/mL   LD    Collection Time: 06/10/22 10:34 PM   Result Value Ref Range     81 - 246 U/L   RETICULOCYTE COUNT    Collection Time: 06/10/22 10:34 PM   Result Value Ref Range    Reticulocyte count 3.5 (H) 0.7 - 2.1 %    Absolute Retic Cnt. 0.0616 0.0164 - 0.0776 M/ul   HAPTOGLOBIN    Collection Time: 06/10/22 10:34 PM   Result Value Ref Range    Haptoglobin 198 30 - 200 mg/dL   RBC, ALLOCATE    Collection Time: 06/11/22  3:15 AM   Result Value Ref Range    HISTORY CHECKED? Historical check performed    CBC WITH AUTOMATED DIFF    Collection Time: 06/11/22  9:50 AM   Result Value Ref Range    WBC 7.1 3.6 - 11.0 K/uL    RBC 2.84 (L) 3.80 - 5.20 M/uL    HGB 7.0 (L) 11.5 - 16.0 g/dL    HCT 22.5 (L) 35.0 - 47.0 %    MCV 79.2 (L) 80.0 - 99.0 FL    MCH 24.6 (L) 26.0 - 34.0 PG    MCHC 31.1 30.0 - 36.5 g/dL    RDW 18.0 (H) 11.5 - 14.5 %    PLATELET 362 (H) 400 - 400 K/uL    MPV 9.0 8.9 - 12.9 FL    NRBC 0.6 (H) 0  WBC    ABSOLUTE NRBC 0.04 (H) 0.00 - 0.01 K/uL    NEUTROPHILS 64 32 - 75 %    LYMPHOCYTES 26 12 - 49 %    MONOCYTES 7 5 - 13 %    EOSINOPHILS 2 0 - 7 %    BASOPHILS 0 0 - 1 %    IMMATURE GRANULOCYTES 1 (H) 0.0 - 0.5 %    ABS. NEUTROPHILS 4.5 1.8 - 8.0 K/UL    ABS.  LYMPHOCYTES 1.8 0.8 - 3.5 K/UL    ABS. MONOCYTES 0.5 0.0 - 1.0 K/UL    ABS. EOSINOPHILS 0.2 0.0 - 0.4 K/UL    ABS. BASOPHILS 0.0 0.0 - 0.1 K/UL    ABS. IMM. GRANS. 0.1 (H) 0.00 - 0.04 K/UL    DF AUTOMATED     METABOLIC PANEL, COMPREHENSIVE    Collection Time: 06/11/22  9:50 AM   Result Value Ref Range    Sodium 140 136 - 145 mmol/L    Potassium 3.5 3.5 - 5.1 mmol/L    Chloride 109 (H) 97 - 108 mmol/L    CO2 25 21 - 32 mmol/L    Anion gap 6 5 - 15 mmol/L    Glucose 87 65 - 100 mg/dL    BUN 8 6 - 20 MG/DL    Creatinine 0.68 0.55 - 1.02 MG/DL    BUN/Creatinine ratio 12 12 - 20      GFR est AA >60 >60 ml/min/1.73m2    GFR est non-AA >60 >60 ml/min/1.73m2    Calcium 8.2 (L) 8.5 - 10.1 MG/DL    Bilirubin, total 1.6 (H) 0.2 - 1.0 MG/DL    ALT (SGPT) 12 12 - 78 U/L    AST (SGOT) 14 (L) 15 - 37 U/L    Alk. phosphatase 109 45 - 117 U/L    Protein, total 5.5 (L) 6.4 - 8.2 g/dL    Albumin 2.7 (L) 3.5 - 5.0 g/dL    Globulin 2.8 2.0 - 4.0 g/dL    A-G Ratio 1.0 (L) 1.1 - 2.2       Recent Labs     06/11/22  0950 06/10/22  1712   WBC 7.1 6.8   HGB 7.0* 3.6*   HCT 22.5* 13.2*   * 589*     Recent Labs     06/11/22  0950 06/10/22  1712    140   K 3.5 3.7   * 105   CO2 25 27   BUN 8 9   CREA 0.68 0.99   GLU 87 86   CA 8.2* 8.4*     Recent Labs     06/11/22  0950 06/10/22  1712   ALT 12 17    121*   TBILI 1.6* 0.3   TP 5.5* 6.2*   ALB 2.7* 3.0*   GLOB 2.8 3.2     No results for input(s): INR, PTP, APTT, INREXT in the last 72 hours. Recent Labs     06/10/22  2234   FERR 12      No results found for: FOL, RBCF   No results for input(s): PH, PCO2, PO2 in the last 72 hours. No results for input(s): CPK, CKNDX, TROIQ in the last 72 hours.     No lab exists for component: CPKMB  Lab Results   Component Value Date/Time    Cholesterol, total 217 (H) 03/29/2017 11:55 AM    HDL Cholesterol 53 03/29/2017 11:55 AM    LDL, calculated 138 (H) 03/29/2017 11:55 AM    Triglyceride 130 03/29/2017 11:55 AM    CHOL/HDL Ratio 3.5 08/29/2009 09:02 AM Lab Results   Component Value Date/Time    Glucose (POC) 93 03/08/2022 07:16 AM    Glucose (POC) 73 10/11/2010 11:13 AM     Lab Results   Component Value Date/Time    Color YELLOW/STRAW 03/01/2022 11:57 AM    Appearance CLEAR 03/01/2022 11:57 AM    Specific gravity 1.025 03/01/2022 11:57 AM    pH (UA) 5.5 03/01/2022 11:57 AM    Protein Negative 03/01/2022 11:57 AM    Glucose Negative 03/01/2022 11:57 AM    Ketone TRACE (A) 03/01/2022 11:57 AM    Bilirubin Negative 03/01/2022 11:57 AM    Urobilinogen 0.2 03/01/2022 11:57 AM    Nitrites Negative 03/01/2022 11:57 AM    Leukocyte Esterase Negative 03/01/2022 11:57 AM    Epithelial cells FEW 03/01/2022 11:57 AM    Bacteria Negative 03/01/2022 11:57 AM    WBC 0-4 03/01/2022 11:57 AM    RBC 0-5 03/01/2022 11:57 AM       Med list reviewed  Current Facility-Administered Medications   Medication Dose Route Frequency    0.9% sodium chloride infusion 250 mL  250 mL IntraVENous PRN    0.9% sodium chloride infusion 250 mL  250 mL IntraVENous PRN    pantoprazole (PROTONIX) 40 mg in 0.9% sodium chloride (MBP/ADV) 50 mL MBP  8 mg/hr IntraVENous CONTINUOUS    atorvastatin (LIPITOR) tablet 20 mg  20 mg Oral QAM    buPROPion SR (WELLBUTRIN SR) tablet 150 mg  150 mg Oral QHS    sodium chloride (NS) flush 5-40 mL  5-40 mL IntraVENous Q8H    sodium chloride (NS) flush 5-40 mL  5-40 mL IntraVENous PRN    acetaminophen (TYLENOL) tablet 650 mg  650 mg Oral Q6H PRN    Or    acetaminophen (TYLENOL) suppository 650 mg  650 mg Rectal Q6H PRN    polyethylene glycol (MIRALAX) packet 17 g  17 g Oral DAILY PRN    ondansetron (ZOFRAN ODT) tablet 4 mg  4 mg Oral Q8H PRN    Or    ondansetron (ZOFRAN) injection 4 mg  4 mg IntraVENous Q6H PRN    0.9% sodium chloride infusion  75 mL/hr IntraVENous CONTINUOUS    0.9% sodium chloride infusion 250 mL  250 mL IntraVENous PRN     Current Outpatient Medications   Medication Sig    senna-docusate (PERICOLACE) 8.6-50 mg per tablet Take 1 Tablet by mouth two (2) times a day.  amitriptyline (ELAVIL) 75 mg tablet Take 75 mg by mouth nightly.  buPROPion SR (WELLBUTRIN SR) 150 mg SR tablet Take 150 mg by mouth nightly.  fluticasone propionate (Children's Flonase Allergy Rlf) 50 mcg/actuation nasal spray 1 [place] by Both Nostrils route daily.  acetaminophen (Tylenol Extra Strength) 500 mg tablet Take 1,000 mg by mouth every six (6) hours as needed for Pain.  famotidine (Pepcid) 20 mg tablet Take 20 mg by mouth nightly.  atorvastatin (LIPITOR) 20 mg tablet Take 20 mg by mouth Every morning.  cyanocobalamin (VITAMIN B12) 1,000 mcg/mL injection 1,000 mcg every month.  lisinopril (PRINIVIL, ZESTRIL) 5 mg tablet Take 5 mg by mouth Every morning.  valACYclovir (VALTREX) 1 gram tablet Take 0.5 Tabs by mouth two (2) times a day. (Patient taking differently: Take 500 mg by mouth as needed.)    omeprazole (PRILOSEC) 40 mg capsule Take 40 mg by mouth Every morning.  Cholecalciferol, Vitamin D3, (VITAMIN D3) 2,000 unit cap capsule Take 2,000 Units by mouth daily.  CARBOXYMETHYLCELLULOSE SODIUM (REFRESH OP) Administer 2 Drops to both eyes as needed.        Care Plan discussed with:  Patient/Family and Nurse    Duy Dimas MD  Internal Medicine  Date of Service: 6/11/2022

## 2022-06-11 NOTE — ANESTHESIA PREPROCEDURE EVALUATION
Relevant Problems   NEUROLOGY   (+) Depression      GASTROINTESTINAL   (+) Hiatal hernia       Anesthetic History   No history of anesthetic complications            Review of Systems / Medical History  Patient summary reviewed, nursing notes reviewed and pertinent labs reviewed    Pulmonary          Smoker         Neuro/Psych         Psychiatric history     Cardiovascular                  Exercise tolerance: >4 METS     GI/Hepatic/Renal     GERD           Endo/Other        Arthritis     Other Findings              Physical Exam    Airway  Mallampati: II  TM Distance: 4 - 6 cm  Neck ROM: normal range of motion   Mouth opening: Normal     Cardiovascular  Regular rate and rhythm,  S1 and S2 normal,  no murmur, click, rub, or gallop             Dental  No notable dental hx       Pulmonary  Breath sounds clear to auscultation               Abdominal  GI exam deferred       Other Findings            Anesthetic Plan    ASA: 2  Anesthesia type: MAC          Induction: Intravenous  Anesthetic plan and risks discussed with: Patient

## 2022-06-11 NOTE — PERIOP NOTES
TRANSFER - OUT REPORT:    Verbal report given to YUSEF lSater(name) on Preston Kee  being transferred to (unit) for routine post - op       Report consisted of patients Situation, Background, Assessment and   Recommendations(SBAR). Time Pre op antibiotic given:janae  Anesthesia Stop time: 5962  Ruiz Present on Transfer to floor:na  Order for Ruiz on Chart:na  Discharge Prescriptions with Chart:na    Information from the following report(s) SBAR, Kardex, Procedure Summary, Intake/Output, MAR and Recent Results was reviewed with the receiving nurse. Opportunity for questions and clarification was provided. Is the patient on 02? NO    Is the patient on a monitor? NO    Is the nurse transporting with the patient? YES    Surgical Waiting Area notified of patient's transfer from PACU?  YES- called patient's daughter, Jayne Padron, updated her on patient status and return to room      The following personal items collected during your admission accompanied patient upon transfer:   Dental Appliance: Dental Appliances: None  Vision: Visual Aid: None  Hearing Aid:    Jewelry:    Clothing:    Other Valuables:    Valuables sent to safe:

## 2022-06-11 NOTE — PROGRESS NOTES
Orders received, chart reviewed and patient evaluated by physical therapy. Pending progression with skilled acute physical therapy, recommend: To be determined: Anticipate OP PT for gait, balance, left foot drop as per patient's plan prior to being in the hosp. Patient is being followed by her spine surgeon for therapy. Recommend with nursing OOB to chair 3x/day and walking daily with one assist and walker. Thank you for completing as able in order to maintain patient strength, endurance and independence. Full evaluation to follow.          Vitals:     06/11/22  1145 06/11/22 1153 06/11/22 1200 06/11/22 1203 06/11/22 1214   BP: 135/68 83/65 139/69 139/68 (!) 132/57     BP 1 Location:   Right upper arm Right upper arm Right upper arm   BP Patient Position: Supine Sitting, at rest Sitting; after stand pivot transfer Standing  Comment: 2 mins Semi fowlers   Pulse: 91 95 (!) 103 94 89   Resp: 18   17    SpO2: 95% RA  95% 94%

## 2022-06-11 NOTE — PROCEDURES
[address], [address] [place] [address]                 NAME:  Lety Arias   :   1321   MRN:   [id number]     Date/Time:  2022 4:44 PM    Esophagogastroduodenoscopy (EGD) Procedure Note    :  Sara Valdovinos MD    Staff: Iveth Oneal: Guy Weldon  Endoscopy Technician-1: Erick Caputo  Endoscopy RN-1: Dexter Keating RN     Referring Provider:  Elsa Lombardi MD    Anethesia/Sedation:  MAC anesthesia Propofol    Preoperative diagnosis: GI Bleed    Postoperative diagnosis: Gastritis  Healed ulcer  Duodenitis    Procedure Details     After infom consent was obtained for the procedure, with all risks and benefits of procedure explained the patient was taken to the endoscopy suite and placed in the left lateral decubitus position. Following sequential administration of sedation as per above, the [id number] gastroscope was inserted into the mouth and advanced under direct vision to second portion of the duodenum. A careful inspection was made as the gastroscope was withdrawn, including a retroflexed view of the proximal stomach; findings and interventions are described below. Findings:  Esophagus: Modertae hiatal hernia  Stomach:Old healed ulcer seen in antrum with surrounding gastritis, biopsies done  Duodenum/jejunum:Mild duodenitis in bulb      Therapies:  none    Specimens: antral bx           EBL: None    Complications:   None; patient tolerated the procedure well. Impression:    See Postoperative diagnosis above    Recommendations:  -Acid suppression with a proton pump inhibitor. , -Await pathology. , -No NSAIDS, -GI lite diet, follow serial hemoglobins and transfuse as needed      Sara Valdovinos MD

## 2022-06-11 NOTE — CONSULTS
[address]    Name:  Tata Constantino  MR#:  [id number]  :  1945  ACCOUNT #:  [de-identified]  DATE OF SERVICE:  2022    GI CONSULTATION    CHIEF COMPLAINT:  Epigastric pain for the past three weeks. REASON FOR CONSULTATION:  Dr. Luz Elena Kumar asked us to evaluate the patient for GI bleeding. HISTORY OF PRESENT ILLNESS:  The patient is a 71-year-old lady with a history of hypertension, major depressive disorder, GERD, dyslipidemia, migraine headaches, MGUS, who presented to the emergency room at [facility] at the advise of her hematologist, who found her hemoglobin to be quite low. In the emergency room, her hemoglobin was found to be 3.6 and she was subsequently transferred to [facility] for further evaluation. The patient stated that for the past two to three weeks, she has been complaining of pain in the epigastrium, pain is crampy and seems to get worse when she eats. She denies any melena, hematochezia, or hematemesis. She has been feeling somewhat weak, but denies any dizziness or syncope. She denies any nausea, vomiting, cough, or fever. Interestingly, she was discharged on 2022 after back surgery, and after the surgery, she did complain of some back pain and she has been treated with gabapentin. She also takes Tylenol, but she denies any nonsteroidal anti-inflammatory drugs intake. PAST MEDICAL HISTORY:  Significant for:  1. Arthritis. 2.  B12 deficiency. 3.  Chronic low back pain. 4.  Degenerative joint disease. 5.  GERD. 6.  Lumbar radiculopathy. 7.  Lumbar spondylosis. 8.  Lumbar stenosis. 9.  MGUS. 10.  Neuropathy. PAST SURGICAL HISTORY:  Significant for:  1. Arthroscopic shoulder surgery. 2.  Cholecystectomy. 3.  Hemorrhoidectomy. 4.  Status post total abdominal hysterectomy and bilateral salpingo-oophorectomy.   5.  History of colostomy after nicked bowel during hysterectomy followed by colostomy takedown. 6.  History of shoulder replacement. MEDICATIONS PRIOR TO ADMISSION:  At home include:  1. Silvia-Colace. 2.  Elavil. 3.  Wellbutrin. 4.  Tylenol. 5.  Pepcid. 6.  Lipitor. 7.  Vitamin B12.  8.  Prinivil. 9.  Valtrex. 10.  Prilosec. 11.  Vitamin D3. ALLERGIES:  TO LACTOSE. SOCIAL HISTORY:  She lives with her granddaughter. She does not smoke, drink alcohol, or use recreational drugs. FAMILY HISTORY:  Her mother had liver cancer and her sister had colon cancer. Her brother had pancreatic cancer and her daughter and niece have had breast cancers. PHYSICAL EXAMINATION:  GENERAL:  She is alert, comfortable, mucous membranes are pale. VITAL SIGNS:  Pulse is 93, temperature 98.5, respiratory rate 18, and pulse ox on room air is 94%. HEAD, EYES, AND ENT:  Pupils are equal, reactive to light and accommodation. Eye movements are normal.  NECK:  Supple. There is no carotid bruit or jugular venous distention. CHEST:  Clear to auscultation. No crackles or wheeze. CARDIOVASCULAR:  Regular rate and rhythm. First and second sounds are normal.  No murmurs. ABDOMEN:  Soft and nontender. Normoactive bowel sounds. No palpable masses. No hepatosplenomegaly. CENTRAL NERVOUS SYSTEM:  She is alert and oriented x3. There is no gross sensory or motor neurological deficit. EXTREMITIES:  Negative for cyanosis, clubbing, or edema. LABORATORY AND DIAGNOSTIC DATA:  Hemoglobin is 7.0; WBC count is 7.1 after three units of transfusion, initially hemoglobin was 3.6; platelets are 494. Potassium is 3.7, sodium is 140, BUN is 9, and creatinine is 0.99. Liver enzymes are normal.  Bilirubin is 1.6. CT scan of the abdomen did not reveal any evidence of active bleeding. ASSESSMENT:  This 81-year-old -American lady has presented with a history of severe anemia and past history of peptic ulcer disease.   She was admitted with a hemoglobin of 3.6 and subsequently required three units of packed cells and her current hemoglobin is 7.0. In view of history of epigastric pain, we would like to rule out peptic ulcer disease and we will proceed with an upper endoscopy in the next one to two days as she has already had liquid diet today. We will teach her with IV Proton pump inhibitors. We will follow serial hemoglobins and transfuse as needed. If upper endoscopy is normal, we will consider repeating her colonoscopy. Dr. Germán Garcia partners will resume care on Monday. RECOMMENDATIONS:  1. Protonix 40 mg IV q.12 hours. 2.  Follow serial hemoglobins and transfuse as needed. 3.  Upper endoscopy in the next one to two days. 4.  Further recommendations to follow after endoscopy results available. Thank you for the consultation.       Cheyanne Anne MD      [id number]  D:  06/11/2022 11:13  T:  06/11/2022 13:39  JOB #:  [id number]  CC:  MD Vaughn Grimes MD

## 2022-06-11 NOTE — ED PROVIDER NOTES
82-year-old female received in transfer from [facility] where she was diagnosed with Hemoccult positive stool and hemoglobin of 3.6. Patient states has a history of a bleeding ulcer. She had some stomach discomfort recently but denies any rikki blood or bleeding. Is not take any blood thinning medications. Has been followed by Dr. Patria Goldberg as a in the past.  She has not received any blood transfusion prior to transfer.            Past Medical History:   Diagnosis Date    Arthritis     B12 deficiency     Chronic pain     LOWER BACK    DDD (degenerative disc disease), cervical 11/2016    GERD (gastroesophageal reflux disease)     History of screening mammography 5/3/2012    Lumbar radiculopathy     Lumbar spondylosis     Lumbar stenosis     MGUS (monoclonal gammopathy of unknown significance)     Neuropathy 11/3/2011    Peripheral neuropathy     S/P arthroscopy of shoulder 11/3/2011    S/P cholecystectomy 11/3/2011    S/P hemorrhoidectomy 11/3/2011    S/P LUCA-BSO 11/3/2011    TMJ pain dysfunction syndrome     Unspecified adverse effect of anesthesia     loss of appetite after shoulder surgery       Past Surgical History:   Procedure Laterality Date    COLONOSCOPY N/A 6/25/2019    [facility] Dmitri Juan [facility] at [facility] ENDOSCOPY    ENDOSCOPY, COLON, DIAGNOSTIC  2001    normal (Abou-Assi)    HX CHOLECYSTECTOMY      HX COLOSTOMY  1985    BOWEL NICKED DURING HYSTERECTOMY REQUIRING COLOSTOMY    HX COLOSTOMY TAKE DOWN  1985    HX HYSTERECTOMY      HX SHOULDER REPLACEMENT Bilateral          Family History:   Problem Relation Age of Onset    Heart Disease Mother     Cancer Mother         liver    Hypertension Father     Stroke Father     Cancer Sister         colon    Cancer Brother         pancreatic    Breast Cancer Daughter 39    Breast Cancer Niece 61    Other Son         Dec drug OD    Anesth Problems Neg Hx        Social History     Socioeconomic History    Marital status:      Spouse name: Not on file    Number of children: Not on file    Years of education: Not on file    Highest education level: Not on file   Occupational History    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   Tobacco Use    Smoking status: Former Smoker     Packs/day: 0.25     Years: 35.00     Pack years: 8.75     Types: Cigarettes     Quit date: 3/1/2021     Years since quittin.2    Smokeless tobacco: Never Used   Vaping Use    Vaping Use: Never used   Substance and Sexual Activity    Alcohol use: Yes     Comment: rare    Drug use: No    Sexual activity: Never   Other Topics Concern    Not on file   Social History Narrative    Lives in [place] alone, GD goes to [address] [place] [address] and visits on weekends     Social Determinants of Health     Financial Resource Strain:     Difficulty of Paying Living Expenses: Not on file   Food Insecurity:     Worried About [facility] in the Last Year: Not on file    Carol of Food in the Last Year: Not on file   Transportation Needs:     Lack of Transportation (Medical): Not on file    Lack of Transportation (Non-Medical):  Not on file   Physical Activity:     Days of Exercise per Week: Not on file    Minutes of Exercise per Session: Not on file   Stress:     Feeling of Stress : Not on file   Social Connections:     Frequency of Communication with Friends and Family: Not on file    Frequency of Social Gatherings with Friends and Family: Not on file    Attends Yazidism Services: Not on file    Active Member of Clubs or Organizations: Not on file    Attends Club or Organization Meetings: Not on file    Marital Status: Not on file   Intimate Partner Violence:     Fear of Current or Ex-Partner: Not on file    Emotionally Abused: Not on file    Physically Abused: Not on file    Sexually Abused: Not on file   Housing Stability:     Unable to Pay for Housing in the Last Year: Not on file    Number of [place] in the Last Year: Not on file    Unstable Housing in the Last Year: Not on file         ALLERGIES: Lactose    Review of Systems   Constitutional: Negative for fever. HENT: Negative for facial swelling. Eyes: Negative for visual disturbance. Respiratory: Negative for chest tightness. Cardiovascular: Negative for chest pain. Gastrointestinal: Negative for abdominal pain. Genitourinary: Negative for difficulty urinating and dysuria. Musculoskeletal: Negative for arthralgias. Skin: Negative for rash. Neurological: Negative for headaches. Hematological: Negative for adenopathy. Psychiatric/Behavioral: Negative for suicidal ideas. There were no vitals filed for this visit. Physical Exam  Vitals and nursing note reviewed. Constitutional:       General: She is not in acute distress. Appearance: She is well-developed. HENT:      Head: Normocephalic and atraumatic. Eyes:      General: No scleral icterus. Conjunctiva/sclera: Conjunctivae normal.      Pupils: Pupils are equal, round, and reactive to light. Cardiovascular:      Rate and Rhythm: Normal rate. Heart sounds: No murmur heard. Pulmonary:      Effort: Pulmonary effort is normal. No respiratory distress. Abdominal:      General: There is no distension. Musculoskeletal:         General: Normal range of motion. Cervical back: Normal range of motion and neck supple. Skin:     General: Skin is warm and dry. Findings: No rash. Neurological:      Mental Status: She is alert and oriented to person, place, and time. MDM     Perfect Serve Consult for Admission  8:42 PM    ED Room Number: ER06/06  Patient Name and age: Micah Munson 78 y.o.  female  Working Diagnosis:   1. Symptomatic anemia    2.  Gastrointestinal hemorrhage, unspecified gastrointestinal hemorrhage type        COVID-19 Suspicion:  no  Sepsis present:  no  Reassessment needed: no  Code Status:  Full Code  Readmission: no  Isolation Requirements:  no  Recommended Level of Care:  telemetry  Department: [facility] Adult ED - [phone]  Admitting Provider:     Other:  Transfer from [address] [place] [address]. Hgb=3.6. Has h/o bleeding ulcer. + hemoccult but no overt bleeidng. VS stable. PRBCs ordered. Known to Dr. Laura Ambrose. Total critical care time spent exclusive of procedures:  0 minutes.     Procedures

## 2022-06-11 NOTE — H&P
History & Physical    Primary Care Provider: Angle Adair MD  Source of Information: Patient and chart review    History of Presenting Illness: Blanca Lynn is a 78 y.o. female with past medical history of hypertension, major depressive disorder, GERD, dyslipidemia, migraine headaches , MGUS who presented to [facility] upon recommendation of outpatient hematology for anemia. Patient reports 2 weeks of lightheadedness, dizziness and intermittent cramping epigastric pain. Chart review shows a known history of gastric ulcer with H. pylori. Has any associated melena or hematochezia. The patient denies any fever, chills, chest or abdominal pain, nausea, vomiting, cough, congestion, recent illness, palpitations, or dysuria. Remarkable vitals on ER Presentations: Vital signs stable. Labs Remarkable for: Hemoglobin 3.6, hematocrit 13.2, platelets 030, FOBT positive  ER Images: none     Review of Systems:  A comprehensive review of systems was negative except for that written in the History of Present Illness.      Past Medical History:   Diagnosis Date    Arthritis     B12 deficiency     Chronic pain     LOWER BACK    DDD (degenerative disc disease), cervical 11/2016    GERD (gastroesophageal reflux disease)     History of screening mammography 5/3/2012    Lumbar radiculopathy     Lumbar spondylosis     Lumbar stenosis     MGUS (monoclonal gammopathy of unknown significance)     Neuropathy 11/3/2011    Peripheral neuropathy     S/P arthroscopy of shoulder 11/3/2011    S/P cholecystectomy 11/3/2011    S/P hemorrhoidectomy 11/3/2011    S/P LUCA-BSO 11/3/2011    TMJ pain dysfunction syndrome     Unspecified adverse effect of anesthesia     loss of appetite after shoulder surgery      Past Surgical History:   Procedure Laterality Date    COLONOSCOPY N/A 6/25/2019    COLONOSCOPY performed by Ev Richardson MD at [address] DIAGNOSTIC  2001    normal (Abou-Assi)    HX CHOLECYSTECTOMY      HX COLOSTOMY  1985    BOWEL NICKED DURING HYSTERECTOMY REQUIRING COLOSTOMY    HX COLOSTOMY TAKE DOWN  1985    HX HYSTERECTOMY      HX SHOULDER REPLACEMENT Bilateral      Prior to Admission medications    Medication Sig Start Date End Date Taking? Authorizing Provider   senna-docusate (PERICOLACE) 8.6-50 mg per tablet Take 1 Tablet by mouth two (2) times a day. 3/9/22   Perla Kelly PA   amitriptyline (ELAVIL) 75 mg tablet Take 75 mg by mouth nightly. Provider, Historical   buPROPion SR (WELLBUTRIN SR) 150 mg SR tablet Take 150 mg by mouth nightly. Provider, Historical   fluticasone propionate (Children's Flonase Allergy Rlf) 50 mcg/actuation nasal spray 1 [place] by Both Nostrils route daily. Provider, Historical   acetaminophen (Tylenol Extra Strength) 500 mg tablet Take 1,000 mg by mouth every six (6) hours as needed for Pain. Provider, Historical   famotidine (Pepcid) 20 mg tablet Take 20 mg by mouth nightly. Provider, Historical   atorvastatin (LIPITOR) 20 mg tablet Take 20 mg by mouth Every morning. 4/2/19   Provider, Historical   cyanocobalamin (VITAMIN B12) 1,000 mcg/mL injection 1,000 mcg every month. 1/9/19   Provider, Historical   lisinopril (PRINIVIL, ZESTRIL) 5 mg tablet Take 5 mg by mouth Every morning. 4/2/19   Provider, Historical   valACYclovir (VALTREX) 1 gram tablet Take 0.5 Tabs by mouth two (2) times a day. Patient taking differently: Take 500 mg by mouth as needed. 8/9/17   Dennis Richey MD   omeprazole (PRILOSEC) 40 mg capsule Take 40 mg by mouth Every morning. Provider, Historical   Cholecalciferol, Vitamin D3, (VITAMIN D3) 2,000 unit cap capsule Take 2,000 Units by mouth daily. Provider, Historical   CARBOXYMETHYLCELLULOSE SODIUM (REFRESH OP) Administer 2 Drops to both eyes as needed.     Provider, Historical     Allergies   Allergen Reactions    Lactose Other (comments)     \"BLOATING \"      Family History   Problem Relation Age of Onset    Heart Disease Mother     Cancer Mother         liver    Hypertension Father     Stroke Father     Cancer Sister         colon    Cancer Brother         pancreatic    Breast Cancer Daughter 39    Breast Cancer Niece 61    Other Son         Dec drug OD    Anesth Problems Neg Hx         SOCIAL HISTORY:  Patient resides:  Independently x   Assisted Living    SNF    With family care       Smoking history:   None x   Former    Chronic      Alcohol history:   None x   Social    Chronic      Ambulates:   Independently x   w/cane    w/walker    w/wc    CODE STATUS:  DNR    Full x   Other      Objective:     Physical Exam:     Visit Vitals  /65   Pulse 93   Temp 98.5 °F (36.9 °C)   Resp 18   Ht 5' (1.524 m)   Wt 79.4 kg (175 lb)   SpO2 94%   BMI 34.18 kg/m²      O2 Device: None (Room air)    General:  Alert, cooperative, no distress, appears stated age. Head:  Normocephalic, without obvious abnormality, atraumatic. Eyes:  Conjunctivae pale   Nose: Nares normal. Septum midline. Mucosa normal.        Neck: Supple, symmetrical, trachea midline. Lungs:   Clear to auscultation bilaterally. Chest wall:  No tenderness or deformity. Heart:  Regular rate and rhythm, S1, S2 normal, no murmur, click, rub or gallop. Abdomen:   Soft, non-tender. Bowel sounds normal. No masses,  No organomegaly. Extremities: Extremities normal, atraumatic, no cyanosis or edema. Pulses: 2+ and symmetric all extremities. Skin: Skin color, texture, turgor normal. No rashes or lesions   Neurologic: CNII-XII intact.           EKG: Sinus rhythm with nonspecific ST-T wave changes  Data Review:     Recent Days:  Recent Labs     06/10/22  1712   WBC 6.8   HGB 3.6*   HCT 13.2*   *     Recent Labs     06/10/22  1712      K 3.7      CO2 27   GLU 86   BUN 9   CREA 0.99   CA 8.4*   ALB 3.0*   ALT 17     No results for input(s): PH, PCO2, PO2, HCO3, FIO2 in the last 72 hours. 24 Hour Results:  Recent Results (from the past 24 hour(s))   CBC WITH AUTOMATED DIFF    Collection Time: 06/10/22  5:12 PM   Result Value Ref Range    WBC 6.8 3.6 - 11.0 K/uL    RBC 1.78 (L) 3.80 - 5.20 M/uL    HGB 3.6 (LL) 11.5 - 16.0 g/dL    HCT 13.2 (LL) 35.0 - 47.0 %    MCV 74.2 (L) 80.0 - 99.0 FL    MCH 20.2 (L) 26.0 - 34.0 PG    MCHC 27.3 (L) 30.0 - 36.5 g/dL    RDW 19.7 (H) 11.5 - 14.5 %    PLATELET 783 (H) 334 - 400 K/uL    MPV 9.2 8.9 - 12.9 FL    NRBC 0.7 (H) 0  WBC    ABSOLUTE NRBC 0.05 (H) 0.00 - 0.01 K/uL    NEUTROPHILS 42 32 - 75 %    LYMPHOCYTES 44 12 - 49 %    MONOCYTES 10 5 - 13 %    EOSINOPHILS 3 0 - 7 %    BASOPHILS 0 0 - 1 %    IMMATURE GRANULOCYTES 1 %    ABS. NEUTROPHILS 2.9 1.8 - 8.0 K/UL    ABS. LYMPHOCYTES 2.9 0.8 - 3.5 K/UL    ABS. MONOCYTES 0.7 0.0 - 1.0 K/UL    ABS. EOSINOPHILS 0.2 0.0 - 0.4 K/UL    ABS. BASOPHILS 0.0 0.0 - 0.1 K/UL    ABS. IMM. GRANS. 0.1 K/UL    DF SMEAR SCANNED      RBC COMMENTS HYPOCHROMIA  2+       METABOLIC PANEL, COMPREHENSIVE    Collection Time: 06/10/22  5:12 PM   Result Value Ref Range    Sodium 140 136 - 145 mmol/L    Potassium 3.7 3.5 - 5.1 mmol/L    Chloride 105 97 - 108 mmol/L    CO2 27 21 - 32 mmol/L    Anion gap 8 5 - 15 mmol/L    Glucose 86 65 - 100 mg/dL    BUN 9 6 - 20 MG/DL    Creatinine 0.99 0.55 - 1.02 MG/DL    BUN/Creatinine ratio 9 (L) 12 - 20      GFR est AA >60 >60 ml/min/1.73m2    GFR est non-AA 55 (L) >60 ml/min/1.73m2    Calcium 8.4 (L) 8.5 - 10.1 MG/DL    Bilirubin, total 0.3 0.2 - 1.0 MG/DL    ALT (SGPT) 17 12 - 78 U/L    AST (SGOT) 19 15 - 37 U/L    Alk. phosphatase 121 (H) 45 - 117 U/L    Protein, total 6.2 (L) 6.4 - 8.2 g/dL    Albumin 3.0 (L) 3.5 - 5.0 g/dL    Globulin 3.2 2.0 - 4.0 g/dL    A-G Ratio 0.9 (L) 1.1 - 2.2     RBC, ALLOCATE    Collection Time: 06/10/22  6:00 PM   Result Value Ref Range    HISTORY CHECKED?  Historical check performed    TROPONIN-HIGH SENSITIVITY    Collection Time: 06/10/22  6:37 PM   Result Value Ref Range    Troponin-High Sensitivity 23 0 - 51 ng/L   RBC, ALLOCATE    Collection Time: 06/10/22  8:45 PM   Result Value Ref Range    HISTORY CHECKED? Historical check performed          Imaging:     Assessment:     Ileana Knight is a 78 y.o. female with past medical history of hypertension, major depressive disorder, GERD, dyslipidemia, migraine headaches , MGUS who is admitted for GI bleed with symptomatic anemia       Plan:       GI blood loss  Symptomatic anemia  History of peptic ulcer disease  -Given history of peptic ulcer disease, start Protonix gtt. -Transfuse 3 units PRBC and repeat CBC  -Keep n.p.o.  -GI consult in a.m.    Migraine headaches  -Continue home amitriptyline    Major depressive disorder  -Continue home amitriptyline and Wellbutrin    Dyslipidemia  -Home statin    Hypertension  -Hold home antihypertensives given concerns for GI bleed.           FEN/GI [email or web address]  Activity - as tolerated  DVT prophylaxis - SCDs  GI prophylaxis -  Protonix  Disposition - Home    CODE STATUS:  Full code       Signed By: Demetrio Vargas MD     Rowan 10, 2022

## 2022-06-11 NOTE — PROGRESS NOTES
Problem: Mobility Impaired (Adult and Pediatric)  Goal: *Acute Goals and Plan of Care (Insert Text)  Description: FUNCTIONAL STATUS PRIOR TO ADMISSION: Patient was modified independent using a rolling walker short distances for functional mobility. Prior to 2 wks ago, pt relied on a wheelchair d/t LLE impairment after back surgery March 2022. HOME SUPPORT PRIOR TO ADMISSION: The patient lived with her grand daughter. She recently completed home health physical therapy, plans to transition to OP PT to continue working on her balance, gait, and left foot drop. Physical Therapy Goals  Initiated 6/11/2022  3. Patient will perform sit to stand with modified independence within 7 day(s). 4.  Patient will ambulate with modified independence for 300 feet with the least restrictive device within 7 day(s). 5.  Patient will ascend/descend 4 stairs with  handrail(s) with supervision/set-up within 7 day(s). Outcome: Not Met       PHYSICAL THERAPY EVALUATION  Patient: Laura Gilmore (78 y.o. female)  Date: 6/11/2022  Primary Diagnosis: GI bleed [K92.2]  Procedure(s) (LRB):  ESOPHAGOGASTRODUODENOSCOPY (EGD) (N/A)     Precautions:  Fall    ASSESSMENT  Based on the objective data described below, the patient presents with with light headedness, generalized weakness, and orthostatic hypotension s/p GIB. Patient was seen in the ED s/p 3 units PRBCs. Hgb improved from 3.6 to 7. At baseline, pt has left foot drop (has AFO, does not use), ambulates short distances with RW. On assessment today patient was light headed with supine/ sit/ stand/ post activity and with 52 mmhg drop in SBP supine to sit. Patient voiced no change in light headedness with extended time sitting up or bed to [facility] transfer. BP improved with time upright and pt able to walk 20 ft in the room with the RW w/ CGA. She is steady with the walker and compensates for her left foot drop demonstrating no foot shuffle.   Gait distance was limited today to allow reassessment of BP which was stable (refer to flow sheet below). Suspect pt is close to her functional baseline. Therapy will follow a few sessions to progress ambulation distance and reassess vitals with activity. Patient notes plan for EGD, hopeful for today. Current Level of Function Impacting Discharge (mobility/balance): CGA transfers and ambulating short distance with RW. Functional Outcome Measure: The patient scored 65/100 on the Barthel outcome measure which is indicative of 35% impairment in functional tasks and mobility. Other factors to consider for discharge:      Patient will benefit from skilled therapy intervention to address the above noted impairments. PLAN :  Recommendations and Planned Interventions: transfer training, gait training, therapeutic exercises, neuromuscular re-education, patient and family training/education and therapeutic activities      Frequency/Duration: Patient will be followed by physical therapy:  3 times a week to address goals. Recommendation for discharge: (in order for the patient to meet his/her long term goals)  To be determined: Patient planned to start OP PT under the direction of her spine surgeon. Anticipate this will be appropriate at discharge.     This discharge recommendation:  Has not yet been discussed the attending provider and/or case management    IF patient discharges home will need the following DME: patient owns DME required for discharge         SUBJECTIVE:       OBJECTIVE DATA SUMMARY:   HISTORY:    Past Medical History:   Diagnosis Date    Arthritis     B12 deficiency     Chronic pain     LOWER BACK    DDD (degenerative disc disease), cervical 11/2016    GERD (gastroesophageal reflux disease)     History of screening mammography 5/3/2012    Lumbar radiculopathy     Lumbar spondylosis     Lumbar stenosis     MGUS (monoclonal gammopathy of unknown significance)     Neuropathy 11/3/2011    Peripheral neuropathy     S/P arthroscopy of shoulder 11/3/2011    S/P cholecystectomy 11/3/2011    S/P hemorrhoidectomy 11/3/2011    S/P LUCA-BSO 11/3/2011    TMJ pain dysfunction syndrome     Unspecified adverse effect of anesthesia     loss of appetite after shoulder surgery     Past Surgical History:   Procedure Laterality Date    COLONOSCOPY N/A 6/25/2019    COLONOSCOPY performed by Denny Mckeon MD at [address] [place] [address] ENDOSCOPY    ENDOSCOPY, COLON, DIAGNOSTIC  2001    normal (Abou[place]Assi)    HX CHOLECYSTECTOMY      HX COLOSTOMY  1985    BOWEL NICKED DURING HYSTERECTOMY REQUIRING COLOSTOMY    HX COLOSTOMY TAKE DOWN  1985    HX HYSTERECTOMY      HX SHOULDER REPLACEMENT Bilateral        Personal factors and/or comorbidities impactin plan of care: PMH/ HPI  (left foot drop after spine surgery March 2022. Recently completed HHPT progressing from [facility] level mobility to ambulating short distances with the RW)    Home Situation  Home Environment: Private residence  # Steps to Enter: 4  Rails to Enter: Yes  One/Two Story Residence: One story  Living Alone: No  Support Systems: Other Family Member(s) (l/w grand daughter)  Current DME Used/Available at Home: Walker, rolling,Wheelchair,Commode, bedside,Shower chair  Tub or Shower Type: Tub    EXAMINATION/PRESENTATION/DECISION MAKING:   Critical Behavior:  Neurologic State: Alert  Orientation Level: Oriented to person,Oriented to place,Oriented to situation,Oriented to time  Cognition: Appropriate decision making     Hearing:   Auditory  Auditory Impairment: None    Range Of Motion:  AROM: Generally decreased, functional (left foot drop from prior surgery)                       Strength:    Strength: Generally decreased, functional (left foot drop)                    Tone & Sensation:   Tone: Normal                              Coordination:  Coordination: Within functional limits  Functional Mobility:  Bed Mobility:  Rolling: Modified independent  Supine to Sit: Modified independent  Sit to Supine: Modified independent  Scooting: Modified independent  Transfers:  Sit to Stand: Contact guard assistance  Stand to Sit: Contact guard assistance  Bed to Chair: Contact guard assistance; Adaptive equipment (rolling walker)              Balance:   Sitting: Intact; Without support  Standing: Impaired; Without support  Standing - Static: Fair;Occasional  Standing - Dynamic : Fair;Constant support  Ambulation/Gait Training:  Distance (ft): 20 Feet (ft)  Assistive Device: Gait belt;Walker, rolling  Ambulation - Level of Assistance: Contact guard assistance     Gait Description (WDL): Exceptions to WDL  Gait Abnormalities: Foot drop (left)        Base of Support: Widened     Speed/Fernanda: Slow     Swing Pattern: Left asymmetrical                   Functional Measure:  Barthel Index:    Bathin  Bladder: 10  Bowels: 10  Groomin  Dressing: 10  Feeding: 10  Mobility: 0  Stairs: 0  Toilet Use: 5 (assist getting to/ from )  Transfer (Bed to Chair and Back): 10  Total: 65/100       The Barthel ADL Index: Guidelines  1. The index should be used as a record of what a patient does, not as a record of what a patient could do. 2. The main aim is to establish degree of independence from any help, physical or verbal, however minor and for whatever reason. 3. The need for supervision renders the patient not independent. 4. A patient's performance should be established using the best available evidence. Asking the patient, friends/relatives and nurses are the usual sources, but direct observation and common sense are also important. However direct testing is not needed. 5. Usually the patient's performance over the preceding 24-48 hours is important, but occasionally longer periods will be relevant. 6. Middle categories imply that the patient supplies over 50 per cent of the effort. 7. Use of aids to be independent is allowed.     Score Interpretation (from [address] [address])    Independent   60-79 Minimally independent   40-59 Partially dependent   20-39 Very dependent   <20 Totally dependent     -Fiona Oh, Barthel, D.W. (5526). Functional evaluation: the Barthel Index. [address] ([address] [place] (1997). The Barthel activities of daily living index: self-reporting versus actual performance in the old (> or = 75 years). Journal of [facility]4 [facility] 45(7), 14 [facility], TOBIN, Kathi Mtz., [facility]. (1999). Measuring the change in disability after inpatient rehabilitation; comparison of the responsiveness of the Barthel Index and Functional Victor Measure. Journal of Neurology, Neurosurgery, and Psychiatry, 66(4), 143-433. AALIYAH Gomez, PARESH Cuevas, & Dajuan Rdz MANTON. (2004) Assessment of post-stroke quality of life in cost-effectiveness studies: The usefulness of the Barthel Index and the EuroQoL-5D. Quality of Life Research, 15, 360-60            Physical Therapy Evaluation Charge Determination   History Examination Presentation Decision-Making   MEDIUM  Complexity : 1-2 comorbidities / personal factors will impact the outcome/ POC  MEDIUM Complexity : 3 Standardized tests and measures addressing body structure, function, activity limitation and / or participation in recreation  MEDIUM Complexity : Evolving with changing characteristics  Other outcome measures Barthel 65/100  MEDIUM      Based on the above components, the patient evaluation is determined to be of the following complexity level: MEDIUM    Pain Rating:  None indicated    Activity Tolerance:   Fair and initially orthostatic with light headedness. Orthostasis improved w/ time upright, light headedness remained constant.      Vitals:       06/11/22  1145 06/11/22 1153 06/11/22 1200 06/11/22 1203 06/11/22 1214   BP: 135/68 83/65   (unchanged w/ re-check) 139/69 139/68 (!) 132/57     BP 1 Location:     Right upper arm Right upper arm Right upper arm   BP Patient Position: Supine Sitting, at rest Sitting; after stand pivot transfer Standing  Comment: 2 mins Semi fowlers   Pulse: 91 95 (!) 103 94 89   Resp: 18     17     SpO2: 95% RA   95% 94%           After treatment patient left in no apparent distress:   Supine in bed, Call bell within reach and Side rails x 3    COMMUNICATION/EDUCATION:   The patients plan of care was discussed with: Registered nurse. Fall prevention education was provided and the patient/caregiver indicated understanding., Patient/family have participated as able in goal setting and plan of care. and Patient/family agree to work toward stated goals and plan of care.     Thank you for this referral.  Bret Madrid, PT   Time Calculation: 39 mins

## 2022-06-11 NOTE — PROGRESS NOTES
Patient examined, consult dictated.  She presents with severe anemia, history of peptic ulcer  IV PPI  Transfuse to Hb >7  EGD in 24-48 hours  [facility] to follow on Monday

## 2022-06-12 LAB
ANION GAP SERPL CALC-SCNC: 5 MMOL/L (ref 5–15)
BUN SERPL-MCNC: 8 MG/DL (ref 6–20)
BUN/CREAT SERPL: 12 (ref 12–20)
CALCIUM SERPL-MCNC: 8.3 MG/DL (ref 8.5–10.1)
CHLORIDE SERPL-SCNC: 110 MMOL/L (ref 97–108)
CO2 SERPL-SCNC: 26 MMOL/L (ref 21–32)
CREAT SERPL-MCNC: 0.67 MG/DL (ref 0.55–1.02)
ERYTHROCYTE [DISTWIDTH] IN BLOOD BY AUTOMATED COUNT: 18 % (ref 11.5–14.5)
GLUCOSE BLD STRIP.AUTO-MCNC: 85 MG/DL (ref 65–117)
GLUCOSE BLD STRIP.AUTO-MCNC: 91 MG/DL (ref 65–117)
GLUCOSE SERPL-MCNC: 81 MG/DL (ref 65–100)
HCT VFR BLD AUTO: 22.6 % (ref 35–47)
HGB BLD-MCNC: 7.1 G/DL (ref 11.5–16)
MAGNESIUM SERPL-MCNC: 1.7 MG/DL (ref 1.6–2.4)
MCH RBC QN AUTO: 24.1 PG (ref 26–34)
MCHC RBC AUTO-ENTMCNC: 31.4 G/DL (ref 30–36.5)
MCV RBC AUTO: 76.6 FL (ref 80–99)
NRBC # BLD: 0.03 K/UL (ref 0–0.01)
NRBC BLD-RTO: 0.4 PER 100 WBC
PLATELET # BLD AUTO: 462 K/UL (ref 150–400)
PMV BLD AUTO: 8.9 FL (ref 8.9–12.9)
POTASSIUM SERPL-SCNC: 3.5 MMOL/L (ref 3.5–5.1)
RBC # BLD AUTO: 2.95 M/UL (ref 3.8–5.2)
SERVICE CMNT-IMP: NORMAL
SERVICE CMNT-IMP: NORMAL
SODIUM SERPL-SCNC: 141 MMOL/L (ref 136–145)
WBC # BLD AUTO: 8.5 K/UL (ref 3.6–11)

## 2022-06-12 PROCEDURE — 74011000250 HC RX REV CODE- 250: Performed by: STUDENT IN AN ORGANIZED HEALTH CARE EDUCATION/TRAINING PROGRAM

## 2022-06-12 PROCEDURE — 97535 SELF CARE MNGMENT TRAINING: CPT

## 2022-06-12 PROCEDURE — 83735 ASSAY OF MAGNESIUM: CPT

## 2022-06-12 PROCEDURE — 74011250637 HC RX REV CODE- 250/637: Performed by: HOSPITALIST

## 2022-06-12 PROCEDURE — 74011250636 HC RX REV CODE- 250/636: Performed by: STUDENT IN AN ORGANIZED HEALTH CARE EDUCATION/TRAINING PROGRAM

## 2022-06-12 PROCEDURE — 74011250637 HC RX REV CODE- 250/637: Performed by: STUDENT IN AN ORGANIZED HEALTH CARE EDUCATION/TRAINING PROGRAM

## 2022-06-12 PROCEDURE — 80048 BASIC METABOLIC PNL TOTAL CA: CPT

## 2022-06-12 PROCEDURE — 85027 COMPLETE CBC AUTOMATED: CPT

## 2022-06-12 PROCEDURE — 65270000046 HC RM TELEMETRY

## 2022-06-12 PROCEDURE — 97165 OT EVAL LOW COMPLEX 30 MIN: CPT

## 2022-06-12 PROCEDURE — 82962 GLUCOSE BLOOD TEST: CPT

## 2022-06-12 PROCEDURE — 74011000258 HC RX REV CODE- 258: Performed by: STUDENT IN AN ORGANIZED HEALTH CARE EDUCATION/TRAINING PROGRAM

## 2022-06-12 PROCEDURE — 36415 COLL VENOUS BLD VENIPUNCTURE: CPT

## 2022-06-12 PROCEDURE — C9113 INJ PANTOPRAZOLE SODIUM, VIA: HCPCS | Performed by: STUDENT IN AN ORGANIZED HEALTH CARE EDUCATION/TRAINING PROGRAM

## 2022-06-12 RX ORDER — LISINOPRIL 5 MG/1
5 TABLET ORAL DAILY
Status: DISCONTINUED | OUTPATIENT
Start: 2022-06-13 | End: 2022-06-14 | Stop reason: HOSPADM

## 2022-06-12 RX ORDER — LANOLIN ALCOHOL/MO/W.PET/CERES
1 CREAM (GRAM) TOPICAL 2 TIMES DAILY WITH MEALS
Status: DISCONTINUED | OUTPATIENT
Start: 2022-06-12 | End: 2022-06-14 | Stop reason: HOSPADM

## 2022-06-12 RX ORDER — PANTOPRAZOLE SODIUM 40 MG/1
40 TABLET, DELAYED RELEASE ORAL
Status: DISCONTINUED | OUTPATIENT
Start: 2022-06-12 | End: 2022-06-14 | Stop reason: HOSPADM

## 2022-06-12 RX ADMIN — BUPROPION HYDROCHLORIDE 150 MG: 150 TABLET, EXTENDED RELEASE ORAL at 21:24

## 2022-06-12 RX ADMIN — PANTOPRAZOLE SODIUM 40 MG: 40 TABLET, DELAYED RELEASE ORAL at 16:34

## 2022-06-12 RX ADMIN — SODIUM CHLORIDE, PRESERVATIVE FREE 10 ML: 5 INJECTION INTRAVENOUS at 21:24

## 2022-06-12 RX ADMIN — FERROUS SULFATE TAB 325 MG (65 MG ELEMENTAL FE) 325 MG: 325 (65 FE) TAB at 09:20

## 2022-06-12 RX ADMIN — FERROUS SULFATE TAB 325 MG (65 MG ELEMENTAL FE) 325 MG: 325 (65 FE) TAB at 16:34

## 2022-06-12 RX ADMIN — SODIUM CHLORIDE 75 ML/HR: 9 INJECTION, SOLUTION INTRAMUSCULAR; INTRAVENOUS; SUBCUTANEOUS at 04:31

## 2022-06-12 RX ADMIN — ATORVASTATIN CALCIUM 20 MG: 20 TABLET, FILM COATED ORAL at 09:20

## 2022-06-12 RX ADMIN — PANTOPRAZOLE SODIUM 40 MG: 40 TABLET, DELAYED RELEASE ORAL at 09:20

## 2022-06-12 RX ADMIN — PANTOPRAZOLE SODIUM 8 MG/HR: 40 INJECTION, POWDER, FOR SOLUTION INTRAVENOUS at 04:39

## 2022-06-12 RX ADMIN — PANTOPRAZOLE SODIUM 8 MG/HR: 40 INJECTION, POWDER, FOR SOLUTION INTRAVENOUS at 00:38

## 2022-06-12 NOTE — PROGRESS NOTES
Hospitalist Progress Note      Hospital summary: 78 y.o lady w/ HTN, MGUS, PUD ( w/ H. Pylori), who was transferred from [facility] for severe anemia. Assessment/Plan:  Acute blood loss anemia: due to GIB, Hgb 3.6. EGD showed a hiatal hernia, old healed ulcer in the antrum w/ surrounding gastritis s/p biopsies, mild duodenitis in the bulb  -s/p 3 U PRBC  -d/c IVF  -change PPI to PO   -monitor H/H, transfuse for Hgb<7  -GI recs appreciated    HTN:   -resume lisinopril in the AM    Hyperlipidemia  History of migraines  MDD  MGUS    Code status: full  DVT prophylaxis: SCDs  Disposition: home likely tomorrow  ----------------------------------------------    CC: f/u anemia    S: denies bleeding, abd pain, dizziness, n/v/d. Review of Systems:  Pertinent items are noted in HPI.     O:  Visit Vitals  BP (!) 138/55   Pulse (!) 101   Temp 98.5 °F (36.9 °C)   Resp 19   Ht 5' (1.524 m)   Wt 85.5 kg (188 lb 9.6 oz)   SpO2 98%   BMI 36.83 kg/m²       PHYSICAL EXAM:  Gen: NAD, non-toxic  HEENT: anicteric sclerae, pale conjunctiva, oropharynx clear, MM moist  Neck: supple, trachea midline, no adenopathy  Heart: RRR, systolic murmur, no JVD, no peripheral edema  Lungs: CTA b/l, non-labored respirations  Abd: soft, NT, ND, BS+  Extr: warm  Skin: dry, no rash  Neuro: CN II-XII grossly intact, normal speech, moves all extremities  Psych: normal mood, appropriate affect      Intake/Output Summary (Last 24 hours) at 6/12/2022 1522  Last data filed at 6/11/2022 2000  Gross per 24 hour   Intake 379.67 ml   Output 550 ml   Net -170.33 ml        Recent labs & imaging reviewed:  Recent Results (from the past 24 hour(s))   CBC W/O DIFF    Collection Time: 06/12/22 12:09 AM   Result Value Ref Range    WBC 8.5 3.6 - 11.0 K/uL    RBC 2.95 (L) 3.80 - 5.20 M/uL    HGB 7.1 (L) 11.5 - 16.0 g/dL    HCT 22.6 (L) 35.0 - 47.0 %    MCV 76.6 (L) 80.0 - 99.0 FL    MCH 24.1 (L) 26.0 - 34.0 PG    MCHC 31.4 30.0 - 36.5 g/dL    RDW 18.0 (H) 11.5 - 14.5 %    PLATELET 797 (H) 057 - 400 K/uL    MPV 8.9 8.9 - 12.9 FL    NRBC 0.4 (H) 0  WBC    ABSOLUTE NRBC 0.03 (H) 0.00 - 5.12 K/uL   METABOLIC PANEL, BASIC    Collection Time: 06/12/22 12:09 AM   Result Value Ref Range    Sodium 141 136 - 145 mmol/L    Potassium 3.5 3.5 - 5.1 mmol/L    Chloride 110 (H) 97 - 108 mmol/L    CO2 26 21 - 32 mmol/L    Anion gap 5 5 - 15 mmol/L    Glucose 81 65 - 100 mg/dL    BUN 8 6 - 20 MG/DL    Creatinine 0.67 0.55 - 1.02 MG/DL    BUN/Creatinine ratio 12 12 - 20      GFR est AA >60 >60 ml/min/1.73m2    GFR est non-AA >60 >60 ml/min/1.73m2    Calcium 8.3 (L) 8.5 - 10.1 MG/DL   MAGNESIUM    Collection Time: 06/12/22 12:09 AM   Result Value Ref Range    Magnesium 1.7 1.6 - 2.4 mg/dL   GLUCOSE, POC    Collection Time: 06/12/22 11:50 AM   Result Value Ref Range    Glucose (POC) 85 65 - 117 mg/dL    Performed by CHERRI Juan  PCT      Recent Labs     06/12/22  0009 06/11/22  0950   WBC 8.5 7.1   HGB 7.1* 7.0*   HCT 22.6* 22.5*   * 468*     Recent Labs     06/12/22  0009 06/11/22  0950 06/10/22  1712    140 140   K 3.5 3.5 3.7   * 109* 105   CO2 26 25 27   BUN 8 8 9   CREA 0.67 0.68 0.99   GLU 81 87 86   CA 8.3* 8.2* 8.4*   MG 1.7  --   --      Recent Labs     06/11/22  0950 06/10/22  1712   ALT 12 17    121*   TBILI 1.6* 0.3   TP 5.5* 6.2*   ALB 2.7* 3.0*   GLOB 2.8 3.2     No results for input(s): INR, PTP, APTT, INREXT, INREXT in the last 72 hours. Recent Labs     06/10/22  2234   FERR 12      No results found for: FOL, RBCF   No results for input(s): PH, PCO2, PO2 in the last 72 hours. No results for input(s): CPK, CKNDX, TROIQ in the last 72 hours.     No lab exists for component: CPKMB  Lab Results   Component Value Date/Time    Cholesterol, total 217 (H) 03/29/2017 11:55 AM    HDL Cholesterol 53 03/29/2017 11:55 AM    LDL, calculated 138 (H) 03/29/2017 11:55 AM    Triglyceride 130 03/29/2017 11:55 AM    CHOL/HDL Ratio 3.5 08/29/2009 09:02 AM     Lab Results   Component Value Date/Time    Glucose (POC) 85 06/12/2022 11:50 AM    Glucose (POC) 93 03/08/2022 07:16 AM    Glucose (POC) 73 10/11/2010 11:13 AM     Lab Results   Component Value Date/Time    Color YELLOW/STRAW 03/01/2022 11:57 AM    Appearance CLEAR 03/01/2022 11:57 AM    Specific gravity 1.025 03/01/2022 11:57 AM    pH (UA) 5.5 03/01/2022 11:57 AM    Protein Negative 03/01/2022 11:57 AM    Glucose Negative 03/01/2022 11:57 AM    Ketone TRACE (A) 03/01/2022 11:57 AM    Bilirubin Negative 03/01/2022 11:57 AM    Urobilinogen 0.2 03/01/2022 11:57 AM    Nitrites Negative 03/01/2022 11:57 AM    Leukocyte Esterase Negative 03/01/2022 11:57 AM    Epithelial cells FEW 03/01/2022 11:57 AM    Bacteria Negative 03/01/2022 11:57 AM    WBC 0-4 03/01/2022 11:57 AM    RBC 0-5 03/01/2022 11:57 AM       Med list reviewed  Current Facility-Administered Medications   Medication Dose Route Frequency    pantoprazole (PROTONIX) tablet 40 mg  40 mg Oral ACB&D    ferrous sulfate tablet 325 mg  1 Tablet Oral BID WITH MEALS    0.9% sodium chloride infusion 250 mL  250 mL IntraVENous PRN    0.9% sodium chloride infusion 250 mL  250 mL IntraVENous PRN    atorvastatin (LIPITOR) tablet 20 mg  20 mg Oral QAM    buPROPion SR (WELLBUTRIN SR) tablet 150 mg  150 mg Oral QHS    sodium chloride (NS) flush 5-40 mL  5-40 mL IntraVENous Q8H    sodium chloride (NS) flush 5-40 mL  5-40 mL IntraVENous PRN    acetaminophen (TYLENOL) tablet 650 mg  650 mg Oral Q6H PRN    Or    acetaminophen (TYLENOL) suppository 650 mg  650 mg Rectal Q6H PRN    polyethylene glycol (MIRALAX) packet 17 g  17 g Oral DAILY PRN    ondansetron (ZOFRAN ODT) tablet 4 mg  4 mg Oral Q8H PRN    Or    ondansetron (ZOFRAN) injection 4 mg  4 mg IntraVENous Q6H PRN    0.9% sodium chloride infusion 250 mL  250 mL IntraVENous PRN       Care Plan discussed with:  Patient/Family    Charis Swanson MD  Internal Medicine  Date of Service: 6/12/2022

## 2022-06-12 NOTE — PROGRESS NOTES
Problem: Patient Education: Go to Patient Education Activity  Goal: Patient/Family Education  Outcome: Progressing Towards Goal     Problem: Patient Education: Go to Patient Education Activity  Goal: Patient/Family Education  Outcome: Progressing Towards Goal     Problem: Upper and Lower GI Bleed: Day 1  Goal: *Optimal pain control at patient's stated goal  Outcome: Progressing Towards Goal     Problem: Upper and Lower GI Bleed: Day 2  Goal: Consults, if ordered  Outcome: Progressing Towards Goal

## 2022-06-12 NOTE — PROGRESS NOTES
Problem: Self Care Deficits Care Plan (Adult)  Goal: *Acute Goals and Plan of Care (Insert Text)  Description: Occupational Therapy Goals  Initiated: 6/12/2022   1. Patient will perform grooming with mod I standing at the sink within 7 day(s). 2.  Patient will perform bathing with setup/supervision chair within 7 day(s). 3.  Patient will perform upper body dressing and lower body dressing with setup/supervision within 7 day(s). 4.  Patient will perform toilet transfers with supervision within 7 day(s). 5.  Patient will perform all aspects of toileting with supervision within 7 day(s). FUNCTIONAL STATUS PRIOR TO ADMISSION: Pt was recently admitted for spinal surgery back in March 2022. Post-operatively she was noted to have left foot drop and discharged to [facility] setting. She was fitted for an AFO and discharged at w/c level. She reports she was able to progress with HHPT and HHOT for increased mobility tasks at home and has been working on ambulation. She also reports her AFO is causing her toes to rub on the top of her shoe making her toes sore. She reports she is to go back to orthotist to re-evaluate the fit of the AFO. HOME SUPPORT: Has support from her grand-daughter and daughter. Outcome: Progressing Towards Goal    OCCUPATIONAL THERAPY EVALUATION  Patient: Zaki Patient (78 y.o. female)  Date: 6/12/2022  Primary Diagnosis: GI bleed [K92.2]  Procedure(s) (LRB):  ESOPHAGOGASTRODUODENOSCOPY (EGD) with biopsy (N/A) 1 Day Post-Op   Precautions:   Fall    ASSESSMENT  Based on the objective data described below, the patient presents with decreased independence with self care and functional mobility following admission for GIB. Pt's HgB in the ED was 3.6 and following 3 units of blood, her values have improved to 7.1 today. She is familiar with OT from [facility] setting and [facility] therapy. She is very pleasant and eager to progress to the chair.   Pt was able to complete transfer with  and cues.  She does have the bed alarm in place and repositioned with chair alarm. She is able to complete bathing activities with setup/supervision from the chair level. Pt overall doing well with activities. Encouraged OOB to the chair for all meals and to/from the [facility] for toileting activities. Anticipate she will be able to discharge home with support form her family. She has all needed DME for home. Current Level of Function Impacting Discharge (ADLs/self-care): min A to supervision    Functional Outcome Measure: The patient scored Total: 70/100 on the Barthel Index outcome measure which is indicative of being 30 % impairment in basic self-care. Other factors to consider for discharge: debility     Patient will benefit from skilled therapy intervention to address the above noted impairments. PLAN :  Recommendations and Planned Interventions: self care training, functional mobility training, therapeutic exercise, balance training, therapeutic activities, endurance activities, patient education, home safety training, and family training/education    Frequency/Duration: Patient will be followed by occupational therapy 5 times a week to address goals. Recommendation for discharge: (in order for the patient to meet his/her long term goals)  Occupational therapy at least 2 days/week in the home     This discharge recommendation:  Has not yet been discussed the attending provider and/or case management    IF patient discharges home will need the following DME: none; has all needed DME at home       SUBJECTIVE:   Patient stated I am feeling alright.     OBJECTIVE DATA SUMMARY:   HISTORY:   Past Medical History:   Diagnosis Date    Arthritis     B12 deficiency     Chronic pain     LOWER BACK    DDD (degenerative disc disease), cervical 11/2016    GERD (gastroesophageal reflux disease)     History of screening mammography 5/3/2012    Lumbar radiculopathy     Lumbar spondylosis     Lumbar stenosis MGUS (monoclonal gammopathy of unknown significance)     Neuropathy 11/3/2011    Peripheral neuropathy     S/P arthroscopy of shoulder 11/3/2011    S/P cholecystectomy 11/3/2011    S/P hemorrhoidectomy 11/3/2011    S/P LUCA-BSO 11/3/2011    TMJ pain dysfunction syndrome     Unspecified adverse effect of anesthesia     loss of appetite after shoulder surgery     Past Surgical History:   Procedure Laterality Date    COLONOSCOPY N/A 6/25/2019    COLONOSCOPY performed by Jamia Hinson MD at [facility] ENDOSCOPY    ENDOSCOPY, COLON, DIAGNOSTIC  2001    normal ([place])    HX CHOLECYSTECTOMY      HX COLOSTOMY  1985    BOWEL NICKED DURING HYSTERECTOMY REQUIRING COLOSTOMY    HX COLOSTOMY TAKE DOWN  1985    HX HYSTERECTOMY      HX SHOULDER REPLACEMENT Bilateral        Expanded or extensive additional review of patient history:     Home Situation  Home Environment: Private residence  # Steps to Enter: 4  Rails to Enter: Yes  Wheelchair Ramp: Yes  One/Two Story Residence: Two story, live on 1st floor  Living Alone: No  Support Systems: Other Family Member(s)  Patient Expects to be Discharged to[de-identified] Home with family assistance (with out-patient therapy services)  Current DME Used/Available at Home: Walker, rolling,Wheelchair,Commode, bedside  Tub or Shower Type: Tub/Shower combination    EXAMINATION OF PERFORMANCE DEFICITS:  Cognitive/Behavioral Status:  Neurologic State: Alert  Orientation Level: Oriented X4  Cognition: Appropriate decision making  Perception: Appears intact  Perseveration: No perseveration noted  Safety/Judgement: Good awareness of safety precautions    Skin: see nursing notes    Edema: none noted    Hearing: Auditory  Auditory Impairment: None    Vision/Perceptual:                           Acuity: Within Defined Limits         Range of Motion:    AROM: Within functional limits (Left foot drop from spine surgery; has AFO )  PROM: Within functional limits                      Strength:    Strength:  Within functional limits                Coordination:  Coordination: Within functional limits  Fine Motor Skills-Upper: Right Intact; Left Intact    Gross Motor Skills-Upper: Right Intact; Left Intact    Tone & Sensation:    Tone: Normal  Sensation: Intact                      Balance:  Sitting: Intact; With support  Standing: Intact; Without support  Standing - Static: Fair  Standing - Dynamic : Fair;Constant support    Functional Mobility and Transfers for ADLs:  Bed Mobility:  Supine to Sit: Modified independent  Sit to Supine:  (sitting up in the chair following therapy)  Scooting: Supervision    Transfers:  Sit to Stand: Contact guard assistance  Stand to Sit: Contact guard assistance  Bed to Chair: Contact guard assistance  Bathroom Mobility: Contact guard assistance  Toilet Transfer : Contact guard assistance    ADL Assessment:  Feeding: Independent    Oral Facial Hygiene/Grooming: Setup;Supervision    Bathing: Setup;Supervision    Upper Body Dressing: Setup    Lower Body Dressing: Minimum assistance    Toileting: Contact guard assistance                ADL Intervention and task modifications:   Pt was able to progress with OOB activities and overall did well with dressing and toileting tasks. Pt was able to progress with CG and cues for tasks. She exhibits no LOB with standing activities at this time. Cognitive Retraining  Safety/Judgement: Good awareness of safety precautions    Functional Measure:    Barthel Index:  Bathin  Bladder: 10  Bowels: 10  Groomin  Dressing: 10  Feeding: 10  Mobility: 5  Stairs: 0  Toilet Use: 5  Transfer (Bed to Chair and Back): 10  Total: 70/100      The Barthel ADL Index: Guidelines  1. The index should be used as a record of what a patient does, not as a record of what a patient could do. 2. The main aim is to establish degree of independence from any help, physical or verbal, however minor and for whatever reason.   3. The need for supervision renders the patient not independent. 4. A patient's performance should be established using the best available evidence. Asking the patient, friends/relatives and nurses are the usual sources, but direct observation and common sense are also important. However direct testing is not needed. 5. Usually the patient's performance over the preceding 24-48 hours is important, but occasionally longer periods will be relevant. 6. Middle categories imply that the patient supplies over 50 per cent of the effort. 7. Use of aids to be independent is allowed. Score Interpretation (from [address] [address])    Independent   60-79 Minimally independent   40-59 Partially dependent   20-39 Very dependent   <20 Totally dependent     -Jeanne Oh., Barthel, D.W. (1965). Functional evaluation: the Barthel Index. [facility] ([address] (1997). The Barthel activities of daily living index: self-reporting versus actual performance in the old (> or = 75 years). Journal of [address] [place] [address] 45(7), [address] [facility], JERIC, Paulina Beckett., Alma Machuca. (1999). Measuring the change in disability after inpatient rehabilitation; comparison of the responsiveness of the Barthel Index and Functional [place] Measure. Journal of Neurology, Neurosurgery, and Psychiatry, 66(4), 871-364. Oscar Reid, N.J.A, PARESH Cuevas, & Chari Thurman MANTON. (2004) Assessment of post-stroke quality of life in cost-effectiveness studies: The usefulness of the Barthel Index and the EuroQoL-5D.  Quality of Life Research, 15, 380-16     Occupational Therapy Evaluation Charge Determination   History Examination Decision-Making   LOW Complexity : Brief history review  LOW Complexity : 1-3 performance deficits relating to physical, cognitive , or psychosocial skils that result in activity limitations and / or participation restrictions  LOW Complexity : No comorbidities that affect functional and no verbal or physical assistance needed to complete eval tasks       Based on the above components, the patient evaluation is determined to be of the following complexity level: LOW   Pain Rating:  No pain    Activity Tolerance:   Good    After treatment patient left in no apparent distress:    Sitting in chair, Call bell within reach, and Bed / chair alarm activated    COMMUNICATION/EDUCATION:   The patients plan of care was discussed with: Physical therapist and Registered nurse. Home safety education was provided and the patient/caregiver indicated understanding. and Patient/family have participated as able in goal setting and plan of care. This patients plan of care is appropriate for delegation to [facility].     Thank you for this referral.  Ember Pozo OT  Time Calculation: 30 mins

## 2022-06-12 NOTE — PROGRESS NOTES
[address]  [address] [facility], [address] [place] [address]                GI PROGRESS NOTE        NAME:   Melquiades Musa       :    1945       MRN:    [id number]     Assessment/Plan   1. Severe anemia: hemoglobin improved after transfusion. EGD revealed healed ulcer in antrum gastritis and mild duodenitis, biopsies pending. She shoyuld get an outpatient/inpatient colon in view of severity of anemia  2. HTN     Patient Active Problem List   Diagnosis Code    Hiatal hernia K44.9    History of seasonal allergies Z88.9    Depression F32. A    Anxiety F41.9    Osteoarthritis M19.90    Genital herpes A60.00    S/P arthroscopy of shoulder Z98.890    S/P cholecystectomy Z90.49    S/P LUCA-BSO Z90.710, Z90.722, Z90.79    S/P hemorrhoidectomy Z98.890, Z87.19    Neuropathy G62.9    Screen for colon cancer Z12.11    History of screening mammography Z92.89    S/P endoscopy Z98.890    Hyperlipidemia E78.5    Vitamin D deficiency E55.9    H/O bone density study Z92.89    DDD (degenerative disc disease), cervical M50.30    Advance directive discussed with patient Z70.80    Status post surgery Z98.890    S/P lumbar fusion Z98.1    GI bleed K92.2       Subjective:      Melquiades Musa is a 78 y.o.  female who was admitted with anemia     Review of Systems    Constitutional: negative fever, negative chills, negative weight loss  Eyes:   negative visual changes  ENT:   negative sore throat, tongue or lip swelling  Respiratory:  negative cough, negative dyspnea  Cards:  negative for chest pain, palpitations, lower extremity edema  GI:   See HPI  :  negative for frequency, dysuria  Integument:  negative for rash and pruritus  Heme:  negative for easy bruising and gum/nose bleeding  Musculoskel: negative for myalgias,  back pain and muscle weakness  Neuro: negative for headaches, dizziness, vertigo  Psych:  negative for feelings of anxiety, depression           Objective:     VITALS:   Last 24hrs VS reviewed since prior hospitalist progress note. Most recent are:  Visit Vitals  BP (!) 156/74   Pulse (!) 103   Temp 98.3 °F (36.8 °C)   Resp 15   Ht 5' (1.524 m)   Wt 85.5 kg (188 lb 9.6 oz)   SpO2 97%   BMI 36.83 kg/m²       Intake/Output Summary (Last 24 hours) at 6/12/2022 1927  Last data filed at 6/12/2022 1540  Gross per 24 hour   Intake 300 ml   Output 300 ml   Net 0 ml        PHYSICAL EXAM:  General   well developed, well nourished, appears stated age, in no acute distress  EENT  Normocephalic, Atraumatic, PERRLA, EOMI, sclera pale  Neck   Supple without nodes or mass. No thyromegaly or bruit  Respiratory   Clear To Auscultation bilaterally - no wheezes, rales, rhonchi, or crackles  Cardiology  Regular Rate and Rythmn  - no murmurs, rubs or gallops  Abdominal  Soft, non-tender, non-distended, positive bowel sounds, no hepatosplenomegaly, no palpable mass  Extremities  No clubbing, cyanosis, or edema. Pulses intact. Skin  Normal skin turgor. No rashes or skin ulcers noted  Neurological  No focal neurological deficits noted  Psychological  Oriented x 3. Normal affect.        Lab Data   Recent Results (from the past 12 hour(s))   GLUCOSE, POC    Collection Time: 06/12/22 11:50 AM   Result Value Ref Range    Glucose (POC) 85 65 - 117 mg/dL    Performed by [facility]  PCT    GLUCOSE, POC    Collection Time: 06/12/22  5:20 PM   Result Value Ref Range    Glucose (POC) 91 65 - 117 mg/dL    Performed by [facility]  PCT          Medications Reviewed    PMH/ reviewed - no change compared to H&P  Attending Physician: Mickey Dahl MD   Date/Time:  6/12/2022    ________________________________________________________________________

## 2022-06-12 NOTE — PROGRESS NOTES
Transition of Care Plan   RUR- 14% Moderate Risk   DISPOSITION: The disposition plan is home with family assistance.  F/U with PCP/Specialist     Transport: Family - Granddaughter    Reason for Admission:  \"PCP had blood work done and suggested I come to the ER. \"                   RUR Score: 14% Moderate Risk                    Plan for utilizing home health: N/A         PCP: First and Last name:  Emilia Brown MD     Name of Practice: [facility]    Are you a current patient: Yes/No: Yes   Approximate date of last visit: (Thursday or Friday of last week)   Can you participate in a virtual visit with your PCP: N/A                    Current Advanced Directive/Advance Care Plan: Full Code  Has no ACP documentation on file at this time. Healthcare Decision Maker:   Click here to complete [address] including selection of the Healthcare Decision Maker Relationship (ie \"Primary\")  Daughter                         Transition of Care Plan:  Likely home, pending recommendation. Reviewed chart for transitions of care and discussed in rounds. CM met with patient at bedside to explain role and offer support. Patient is alert and oriented x4 and confirmed demographics. Baseline: DME - wheelchair and walker  ADLs/IDALS: Needs assistance   Previous Home Health: Yes, unsure of name of agency at this time. Previous SNF/IPR: Yes, unsure of name of facility at this time. ER Contact: Family - Granddughter     Patient lives in a 2 level house with 4 steps to enter. Patient reports that her granddaughter lives with her. Patient is needs assistance with ADLs/IDALs. Patient uses DMEs (wheelchair and walker) to ambulate. Patient's preferred pharmacy is [facility] located [facility] for her perscriptions. Patient's granddaughter is expected to transport at discharge. Care Management Interventions  PCP Verified by CM:  Yes  Palliative Care Criteria Met (RRAT>21 & CHF Dx)?: No  Mode of Transport at Discharge: Other (see comment)  Transition of Care Consult (CM Consult): Discharge Planning  MyChart Signup: Yes  Discharge Durable Medical Equipment: No  Physical Therapy Consult: Yes  Occupational Therapy Consult: Yes  Speech Therapy Consult: No  Support Systems: Other Family Member(s),Child(chas)  Confirm Follow Up Transport: Family  The Patient and/or Patient Representative was Provided with a Choice of Provider and Agrees with the Discharge Plan?: Yes  Freedom of Choice List was Provided with Basic Dialogue that Supports the Patient's Individualized Plan of Care/Goals, Treatment Preferences and Shares the Quality Data Associated with the Providers?: Yes   Resource Information Provided?: No  Discharge Location  Patient Expects to be Discharged to[de-identified] Home with family assistance (with out-patient therapy services)    Medicare pt has received, reviewed, and signed 2nd IM letter informing them of their right to appeal the discharge. Signed copy has been placed on pt bedside chart. CM met with patient and patient signed document, copy was made and given to patient. Original was placed in bedside chart.     ZARIA Nelson, CRM, LMHP-e  Available in [employer]

## 2022-06-13 LAB
ANION GAP SERPL CALC-SCNC: 3 MMOL/L (ref 5–15)
ATRIAL RATE: 92 BPM
BUN SERPL-MCNC: 6 MG/DL (ref 6–20)
BUN/CREAT SERPL: 8 (ref 12–20)
CALCIUM SERPL-MCNC: 8.4 MG/DL (ref 8.5–10.1)
CALCULATED P AXIS, ECG09: 69 DEGREES
CALCULATED R AXIS, ECG10: 47 DEGREES
CALCULATED T AXIS, ECG11: 82 DEGREES
CHLORIDE SERPL-SCNC: 110 MMOL/L (ref 97–108)
CO2 SERPL-SCNC: 26 MMOL/L (ref 21–32)
CREAT SERPL-MCNC: 0.76 MG/DL (ref 0.55–1.02)
DIAGNOSIS, 93000: NORMAL
ERYTHROCYTE [DISTWIDTH] IN BLOOD BY AUTOMATED COUNT: 19.2 % (ref 11.5–14.5)
GLUCOSE SERPL-MCNC: 79 MG/DL (ref 65–100)
HCT VFR BLD AUTO: 21.8 % (ref 35–47)
HCT VFR BLD AUTO: 27.7 % (ref 35–47)
HGB BLD-MCNC: 6.5 G/DL (ref 11.5–16)
HGB BLD-MCNC: 8.5 G/DL (ref 11.5–16)
HISTORY CHECKED?,CKHIST: NORMAL
HISTORY CHECKED?,CKHIST: NORMAL
MCH RBC QN AUTO: 23.9 PG (ref 26–34)
MCHC RBC AUTO-ENTMCNC: 29.8 G/DL (ref 30–36.5)
MCV RBC AUTO: 80.1 FL (ref 80–99)
NRBC # BLD: 0.02 K/UL (ref 0–0.01)
NRBC BLD-RTO: 0.2 PER 100 WBC
P-R INTERVAL, ECG05: 178 MS
PLATELET # BLD AUTO: 427 K/UL (ref 150–400)
PMV BLD AUTO: 9.2 FL (ref 8.9–12.9)
POTASSIUM SERPL-SCNC: 2.9 MMOL/L (ref 3.5–5.1)
Q-T INTERVAL, ECG07: 360 MS
QRS DURATION, ECG06: 80 MS
QTC CALCULATION (BEZET), ECG08: 445 MS
RBC # BLD AUTO: 2.72 M/UL (ref 3.8–5.2)
SODIUM SERPL-SCNC: 139 MMOL/L (ref 136–145)
VENTRICULAR RATE, ECG03: 92 BPM
WBC # BLD AUTO: 9.1 K/UL (ref 3.6–11)

## 2022-06-13 PROCEDURE — 74011000250 HC RX REV CODE- 250: Performed by: STUDENT IN AN ORGANIZED HEALTH CARE EDUCATION/TRAINING PROGRAM

## 2022-06-13 PROCEDURE — 36415 COLL VENOUS BLD VENIPUNCTURE: CPT

## 2022-06-13 PROCEDURE — 74011250636 HC RX REV CODE- 250/636: Performed by: NURSE PRACTITIONER

## 2022-06-13 PROCEDURE — P9016 RBC LEUKOCYTES REDUCED: HCPCS

## 2022-06-13 PROCEDURE — 80048 BASIC METABOLIC PNL TOTAL CA: CPT

## 2022-06-13 PROCEDURE — 85018 HEMOGLOBIN: CPT

## 2022-06-13 PROCEDURE — 74011250637 HC RX REV CODE- 250/637: Performed by: STUDENT IN AN ORGANIZED HEALTH CARE EDUCATION/TRAINING PROGRAM

## 2022-06-13 PROCEDURE — 85027 COMPLETE CBC AUTOMATED: CPT

## 2022-06-13 PROCEDURE — 74011250637 HC RX REV CODE- 250/637: Performed by: HOSPITALIST

## 2022-06-13 PROCEDURE — 74011250636 HC RX REV CODE- 250/636: Performed by: STUDENT IN AN ORGANIZED HEALTH CARE EDUCATION/TRAINING PROGRAM

## 2022-06-13 PROCEDURE — 97116 GAIT TRAINING THERAPY: CPT

## 2022-06-13 PROCEDURE — 65270000046 HC RM TELEMETRY

## 2022-06-13 PROCEDURE — 36430 TRANSFUSION BLD/BLD COMPNT: CPT

## 2022-06-13 PROCEDURE — 97530 THERAPEUTIC ACTIVITIES: CPT

## 2022-06-13 RX ORDER — SODIUM CHLORIDE 9 MG/ML
250 INJECTION, SOLUTION INTRAVENOUS AS NEEDED
Status: DISCONTINUED | OUTPATIENT
Start: 2022-06-13 | End: 2022-06-14 | Stop reason: HOSPADM

## 2022-06-13 RX ORDER — POTASSIUM CHLORIDE 7.45 MG/ML
10 INJECTION INTRAVENOUS
Status: COMPLETED | OUTPATIENT
Start: 2022-06-13 | End: 2022-06-13

## 2022-06-13 RX ADMIN — LISINOPRIL 5 MG: 5 TABLET ORAL at 09:06

## 2022-06-13 RX ADMIN — POTASSIUM CHLORIDE 10 MEQ: 7.46 INJECTION, SOLUTION INTRAVENOUS at 08:31

## 2022-06-13 RX ADMIN — SODIUM CHLORIDE, PRESERVATIVE FREE 10 ML: 5 INJECTION INTRAVENOUS at 22:50

## 2022-06-13 RX ADMIN — ATORVASTATIN CALCIUM 20 MG: 20 TABLET, FILM COATED ORAL at 09:06

## 2022-06-13 RX ADMIN — ONDANSETRON HYDROCHLORIDE 4 MG: 2 SOLUTION INTRAMUSCULAR; INTRAVENOUS at 17:19

## 2022-06-13 RX ADMIN — BUPROPION HYDROCHLORIDE 150 MG: 150 TABLET, EXTENDED RELEASE ORAL at 22:50

## 2022-06-13 RX ADMIN — POTASSIUM CHLORIDE 10 MEQ: 7.46 INJECTION, SOLUTION INTRAVENOUS at 09:14

## 2022-06-13 RX ADMIN — PANTOPRAZOLE SODIUM 40 MG: 40 TABLET, DELAYED RELEASE ORAL at 16:49

## 2022-06-13 RX ADMIN — FERROUS SULFATE TAB 325 MG (65 MG ELEMENTAL FE) 325 MG: 325 (65 FE) TAB at 16:49

## 2022-06-13 RX ADMIN — PANTOPRAZOLE SODIUM 40 MG: 40 TABLET, DELAYED RELEASE ORAL at 06:43

## 2022-06-13 RX ADMIN — POTASSIUM CHLORIDE 10 MEQ: 7.46 INJECTION, SOLUTION INTRAVENOUS at 06:43

## 2022-06-13 RX ADMIN — FERROUS SULFATE TAB 325 MG (65 MG ELEMENTAL FE) 325 MG: 325 (65 FE) TAB at 09:06

## 2022-06-13 RX ADMIN — SODIUM CHLORIDE, PRESERVATIVE FREE 10 ML: 5 INJECTION INTRAVENOUS at 13:59

## 2022-06-13 NOTE — PROGRESS NOTES
Problem: Mobility Impaired (Adult and Pediatric)  Goal: *Acute Goals and Plan of Care (Insert Text)  Description: FUNCTIONAL STATUS PRIOR TO ADMISSION: Patient was modified independent using a rolling walker short distances for functional mobility. Prior to 2 wks ago, pt relied on a wheelchair d/t LLE impairment after back surgery March 2022. HOME SUPPORT PRIOR TO ADMISSION: The patient lived with her grand daughter. She recently completed home health physical therapy, plans to transition to OP PT to continue working on her balance, gait, and left foot drop. Physical Therapy Goals  Initiated 6/11/2022  3. Patient will perform sit to stand with modified independence within 7 day(s). 4.  Patient will ambulate with modified independence for 300 feet with the least restrictive device within 7 day(s). 5.  Patient will ascend/descend 4 stairs with  handrail(s) with supervision/set-up within 7 day(s). Outcome: Progressing Towards Goal   PHYSICAL THERAPY TREATMENT  Patient: Amita Shebly (78 y.o. female)  Date: 6/13/2022  Diagnosis: GI bleed [K92.2] <principal problem not specified>  Procedure(s) (LRB):  ESOPHAGOGASTRODUODENOSCOPY (EGD) with biopsy (N/A) 2 Days Post-Op  Precautions: Fall, L foot drop  Chart, physical therapy assessment, plan of care and goals were reviewed. ASSESSMENT  Pt cleared for session by RN. Patient continues with skilled PT services and is progressing towards goals. Pt seen this afternoon following another blood transfusion after dropped Hgb this AM.  Pt continues to report symptoms with sit to stand despite stable BP assessed this session. Pt agreeable to bed mobility, transfers, gait, and OOB to chair. She anticipates D/C tomorrow if Hgb maintains. Pt report being readmitted multiple times since March for this recurring issue. GI in to speak with pt and plan is for colonoscopy to be performed in OP setting. Pt is frustrated.   Pt performed well with therapy this date (new Hgb 8.5) though safety concerns/fall risk concerns will arise with another drop once pt returns home (Hgb 3.6 upon arrival). Pt states her children \"don't let me walk without them\" and she does not use her walker without family present. Pt reports progressing from [facility] services recently and plans for OPPT under the supervision of his spinal surgeon. Pt left in chair in NAD following session events. Next session: clear any stairs for home needs for D/C    Current Level of Function Impacting Discharge (mobility/balance): upwards of CGA with DME    Other factors to consider for discharge: supportive family, recurring medical issue         PLAN :  Patient continues to benefit from skilled intervention to address the above impairments. Continue treatment per established plan of care. to address goals. Recommendation for discharge: (in order for the patient to meet his/her long term goals)  Outpatient physical therapy follow up recommended for previously determined post op care    This discharge recommendation:  Has been made in collaboration with the attending provider and/or case management    IF patient discharges home will need the following DME: pt reports having RW for home use; she may benefit from a life alert button in the future, should this issue persist       SUBJECTIVE:   Patient stated I am just so tired of all of it. .. this has been going on since March.     OBJECTIVE DATA SUMMARY:   Critical Behavior:  Neurologic State: Alert  Orientation Level: Oriented X4  Cognition: Appropriate decision making,Follows commands  Safety/Judgement: Good awareness of safety precautions  Functional Mobility Training:  Bed Mobility:  Rolling: Independent;Bed Modified  Supine to Sit: Independent;Bed Modified  Sit to Supine: Independent;Bed Modified  Scooting: Independent        Transfers:  Sit to Stand: Contact guard assistance  Stand to Sit: Contact guard assistance        Bed to Chair:  (NT) Balance:  Sitting: Intact  Standing: Intact; With support  Standing - Static: Fair;Constant support  Standing - Dynamic : Fair;Constant support  Ambulation/Gait Training:  Distance (ft): 30 Feet (ft) (x2)  Assistive Device: Gait belt;Walker, rolling  Ambulation - Level of Assistance: Contact guard assistance; Additional time        Gait Abnormalities: Foot drop; Steppage gait        Base of Support: Widened     Speed/Fernanda: Slow;Pace decreased (<100 feet/min)     Swing Pattern: Left asymmetrical      Therapeutic Exercises:   Educated on pt performing AP, LAQ, marching, while sitting in her chair  Pain Rating:  NA    Activity Tolerance:   Fair; dizziness with initial stance (pt requested to lie down, BP stable)    After treatment patient left in no apparent distress:   Sitting in chair, Call bell within reach, and Bed / chair alarm activated    COMMUNICATION/COLLABORATION:   The patients plan of care was discussed with: Registered nurse, OTLaurent Cortes [place]   Time Calculation: 24 mins

## 2022-06-13 NOTE — PROGRESS NOTES
Patient received with bed alarm going off, patient sitting EOB with cristina rangel. Patient stating [place] gown and cristina with no assistance or difficulty. Patient demonstrated sit-supine with modified independence. Continue to recommend New Vel.

## 2022-06-13 NOTE — PROGRESS NOTES
0900-   1 unit PRBCs infusing   1130- Blood transfusion completed. Patient tolerated well  1430- H &H sent to lab  1530- H& H 8.5    Bedside shift change report given to Tomi Knox (oncoming nurse) by Mu Tovar (offgoing nurse). Report included the following information SBAR, Kardex, Procedure Summary, Intake/Output, MAR, Recent Results and Cardiac Rhythm NSR.

## 2022-06-13 NOTE — PROGRESS NOTES
Transition of Care  1. RUR 14% Low  2. Disposition Home-outpatient PT  3. DME Walker, wheelchair  4. Transportation Family   5. Follow Up PCP, Specialist      CM reviewed initial CM notes. Patient lives at home with her granddaughter. She normally uses a walker for ambulation, also owns a wheelchair. Patient recommended for home health. Patient is planned for DC home tomorrow pending hemoglobin. GI following. CM will follow up with patient for home health choice and place referrals. Will update AVS when home health is confirmed. Transition of Care Plan:     The Plan for Transition of Care is related to the following treatment goals: Home Health    The Patient and/or patient representative was provided with a choice of provider and agrees  with the discharge plan. Yes [x] No []    A Freedom of choice list was provided with basic dialogue that supports the patient's individualized plan of care/goals and shares the quality data associated with the providers. Yes [x] No []      Sera Giron MS    3:52 CM met with patient to discuss recommendation for home health. Patient states that she was receiving home health therapy prior to admission but is not sure of the name of the provider. She does not want to continue with home health but wants to participate in outpatient therapy with [facility] at [address] [place] [address]. CM to have attending complete order.      Sera Giron MS

## 2022-06-13 NOTE — PROGRESS NOTES
[address] [facility]  Hospitalist Group                                                                                          Hospitalist Progress Note  Jason Mcduffie MD  Answering service: [phone] from in house phone        Date of Service:  2022  NAME:  Ciaran Salcedo  :  1945  MRN:  [id number]      Admission Summary:   Ciaran Salcedo is a 78 y.o. female with past medical history of hypertension, major depressive disorder, GERD, dyslipidemia, migraine headaches , MGUS who presented to [facility] upon recommendation of outpatient hematology for anemia. Patient reports 2 weeks of lightheadedness, dizziness and intermittent cramping epigastric pain. Chart review shows a known history of gastric ulcer with H. pylori. Has any associated melena or hematochezia. The patient denies any fever, chills, chest or abdominal pain, nausea, vomiting, cough, congestion, recent illness, palpitations, or dysuria.     Remarkable vitals on ER Presentations: Vital signs stable. Labs Remarkable for: Hemoglobin 3.6, hematocrit 13.2, platelets 702, FOBT positive  ER Images: none       Interval history / Subjective:   NAD, no acute events over night, comfortable in bed  No rectal bleeding over night  Hb 6.5 in am  GI input appreciated     Assessment & Plan:     Acute blood loss anemia due to GIB,   Hgb 3.6 on admission.    S/p transfusion PRBC  GI consulted,  s/p EGD:   hiatal hernia, old healed ulcer in the antrum w/ surrounding gastritis s/p biopsies, mild duodenitis in the bulb,   -s/p 3 U PRBC  -d/c IVF  -change PPI to PO   -monitor H/H, transfuse for Hgb<7  -GI recs appreciated  Will transfuse PRBC today  Continue to monitor H&H  F/u gastric Bx, pending    Acute GI bleeding, possible upper  S/p EGD as above, Bx pending, no acute bleeding   Continue PPI BID  F/u gastric biopsy    HTN:   continue lisinopril     Hypokalemia:  replace     Hyperlipidemia  History of migraines  MDD  MGUS       Code status: Full code  Prophylaxis: SCD  Care Plan discussed with: patient  Anticipated Disposition: possible home tomorrow, f/u H&H, will need PT/OT     Hospital Problems  Date Reviewed: 6/11/2022          Codes Class Noted POA    GI bleed ICD-10-CM: K92.2  ICD-9-CM: 578.9  6/10/2022 Unknown                Review of Systems:   Pertinent items are noted in HPI. Vital Signs:    Last 24hrs VS reviewed since prior progress note. Most recent are:  Visit Vitals  BP (!) 155/77 (BP 1 Location: Right upper arm, BP Patient Position: At rest;Sitting)   Pulse 98   Temp 97.7 °F (36.5 °C)   Resp 14   Ht 5' (1.524 m)   Wt 83.7 kg (184 lb 8 oz)   SpO2 97%   BMI 36.03 kg/m²         Intake/Output Summary (Last 24 hours) at 6/13/2022 1337  Last data filed at 6/13/2022 1115  Gross per 24 hour   Intake 808.8 ml   Output 1000 ml   Net -191.2 ml        Physical Examination:     I had a face to face encounter with this patient and independently examined them on 6/13/2022 as outlined below:          Constitutional:  No acute distress, cooperative, pleasant    ENT:  Oral mucosa moist, oropharynx benign. Resp:  CTA bilaterally. No wheezing/rhonchi/rales. No accessory muscle use. CV:  Regular rhythm, normal rate, no murmurs, gallops, rubs    GI:  Soft, non distended, non tender. normoactive bowel sounds, no hepatosplenomegaly     Musculoskeletal:  No edema, warm, 2+ pulses throughout    Neurologic:  Moves all extremities.   AAOx3, CN II-XII reviewed            Data Review:    Review and/or order of clinical lab test      Labs:     Recent Labs     06/13/22  0203 06/12/22  0009   WBC 9.1 8.5   HGB 6.5* 7.1*   HCT 21.8* 22.6*   * 462*     Recent Labs     06/13/22  0203 06/12/22  0009 06/11/22  0950    141 140   K 2.9* 3.5 3.5   * 110* 109*   CO2 26 26 25   BUN 6 8 8   CREA 0.76 0.67 0.68   GLU 79 81 87   CA 8.4* 8.3* 8.2*   MG  --  1.7  --      Recent Labs     06/11/22  0950 06/10/22  1712   ALT 12 17    121*   TBILI 1.6* 0.3   TP 5.5* 6.2*   ALB 2.7* 3.0*   GLOB 2.8 3.2     No results for input(s): INR, PTP, APTT, INREXT in the last 72 hours. Recent Labs     06/10/22  2234   FERR 12      No results found for: FOL, RBCF   No results for input(s): PH, PCO2, PO2 in the last 72 hours. No results for input(s): CPK, CKNDX, TROIQ in the last 72 hours.     No lab exists for component: CPKMB  Lab Results   Component Value Date/Time    Cholesterol, total 217 (H) 03/29/2017 11:55 AM    HDL Cholesterol 53 03/29/2017 11:55 AM    LDL, calculated 138 (H) 03/29/2017 11:55 AM    Triglyceride 130 03/29/2017 11:55 AM    CHOL/HDL Ratio 3.5 08/29/2009 09:02 AM     Lab Results   Component Value Date/Time    Glucose (POC) 91 06/12/2022 05:20 PM    Glucose (POC) 85 06/12/2022 11:50 AM    Glucose (POC) 93 03/08/2022 07:16 AM    Glucose (POC) 73 10/11/2010 11:13 AM     Lab Results   Component Value Date/Time    Color YELLOW/STRAW 03/01/2022 11:57 AM    Appearance CLEAR 03/01/2022 11:57 AM    Specific gravity 1.025 03/01/2022 11:57 AM    pH (UA) 5.5 03/01/2022 11:57 AM    Protein Negative 03/01/2022 11:57 AM    Glucose Negative 03/01/2022 11:57 AM    Ketone TRACE (A) 03/01/2022 11:57 AM    Bilirubin Negative 03/01/2022 11:57 AM    Urobilinogen 0.2 03/01/2022 11:57 AM    Nitrites Negative 03/01/2022 11:57 AM    Leukocyte Esterase Negative 03/01/2022 11:57 AM    Epithelial cells FEW 03/01/2022 11:57 AM    Bacteria Negative 03/01/2022 11:57 AM    WBC 0-4 03/01/2022 11:57 AM    RBC 0-5 03/01/2022 11:57 AM         Medications Reviewed:     Current Facility-Administered Medications   Medication Dose Route Frequency    0.9% sodium chloride infusion 250 mL  250 mL IntraVENous PRN    0.9% sodium chloride infusion 250 mL  250 mL IntraVENous PRN    pantoprazole (PROTONIX) tablet 40 mg  40 mg Oral ACB&D    ferrous sulfate tablet 325 mg  1 Tablet Oral BID WITH MEALS    lisinopriL (PRINIVIL, ZESTRIL) tablet 5 mg 5 mg Oral DAILY    0.9% sodium chloride infusion 250 mL  250 mL IntraVENous PRN    0.9% sodium chloride infusion 250 mL  250 mL IntraVENous PRN    atorvastatin (LIPITOR) tablet 20 mg  20 mg Oral QAM    buPROPion SR (WELLBUTRIN SR) tablet 150 mg  150 mg Oral QHS    sodium chloride (NS) flush 5-40 mL  5-40 mL IntraVENous Q8H    sodium chloride (NS) flush 5-40 mL  5-40 mL IntraVENous PRN    acetaminophen (TYLENOL) tablet 650 mg  650 mg Oral Q6H PRN    Or    acetaminophen (TYLENOL) suppository 650 mg  650 mg Rectal Q6H PRN    polyethylene glycol (MIRALAX) packet 17 g  17 g Oral DAILY PRN    ondansetron (ZOFRAN ODT) tablet 4 mg  4 mg Oral Q8H PRN    Or    ondansetron (ZOFRAN) injection 4 mg  4 mg IntraVENous Q6H PRN    0.9% sodium chloride infusion 250 mL  250 mL IntraVENous PRN     ______________________________________________________________________  EXPECTED LENGTH OF STAY: - - -  ACTUAL LENGTH OF STAY:          3                 Marc Acosta MD

## 2022-06-13 NOTE — PROGRESS NOTES
[address]  [address] [facility], [address]       GI PROGRESS NOTE  [facility]  [phone] NP in-hospital cell phone M-F until 4:30  After 5pm or on weekends, please call  for physician on call      NAME: Mercedes Jimenez   :  1945   MRN:  [id number]       Subjective:   She is feeling well, had normal bowel movement this am, no GI complaints. Objective:     VITALS:   Last 24hrs VS reviewed since prior progress note. Most recent are:  Visit Vitals  BP (!) 155/77 (BP 1 Location: Right upper arm, BP Patient Position: At rest;Sitting)   Pulse 98   Temp 97.7 °F (36.5 °C)   Resp 14   Ht 5' (1.524 m)   Wt 83.7 kg (184 lb 8 oz)   SpO2 97%   BMI 36.03 kg/m²       PHYSICAL EXAM:  General: Cooperative, no acute distress    Neurologic:  Alert and oriented X 3. HEENT: EOMI, no scleral icterus   Lungs:  No increased WOB  Heart:  Regular rate  Abdomen: Soft, non-distended, no tenderness. Extremities: warm  Psych:   Good insight. Not anxious or agitated.     Lab Data Reviewed:     Recent Results (from the past 24 hour(s))   GLUCOSE, POC    Collection Time: 22  5:20 PM   Result Value Ref Range    Glucose (POC) 91 65 - 117 mg/dL    Performed by Kaitlynn Casiano  PCT    CBC W/O DIFF    Collection Time: 22  2:03 AM   Result Value Ref Range    WBC 9.1 3.6 - 11.0 K/uL    RBC 2.72 (L) 3.80 - 5.20 M/uL    HGB 6.5 (L) 11.5 - 16.0 g/dL    HCT 21.8 (L) 35.0 - 47.0 %    MCV 80.1 80.0 - 99.0 FL    MCH 23.9 (L) 26.0 - 34.0 PG    MCHC 29.8 (L) 30.0 - 36.5 g/dL    RDW 19.2 (H) 11.5 - 14.5 %    PLATELET 321 (H) 025 - 400 K/uL    MPV 9.2 8.9 - 12.9 FL    NRBC 0.2 (H) 0  WBC    ABSOLUTE NRBC 0.02 (H) 0.00 - 5.81 K/uL   METABOLIC PANEL, BASIC    Collection Time: 22  2:03 AM   Result Value Ref Range    Sodium 139 136 - 145 mmol/L    Potassium 2.9 (L) 3.5 - 5.1 mmol/L    Chloride 110 (H) 97 - 108 mmol/L    CO2 26 21 - 32 mmol/L    Anion gap 3 (L) 5 - 15 mmol/L    Glucose 79 65 - 100 mg/dL    BUN 6 6 - 20 MG/DL    Creatinine 0.76 0.55 - 1.02 MG/DL    BUN/Creatinine ratio 8 (L) 12 - 20      GFR est AA >60 >60 ml/min/1.73m2    GFR est non-AA >60 >60 ml/min/1.73m2    Calcium 8.4 (L) 8.5 - 10.1 MG/DL   RBC, ALLOCATE    Collection Time: 06/13/22  6:15 AM   Result Value Ref Range    HISTORY CHECKED? Historical check performed    RBC, ALLOCATE    Collection Time: 06/13/22  7:45 AM   Result Value Ref Range    HISTORY CHECKED? Historical check performed             Assessment:     · Anemia: EGD 6/11/22: moderate hiatal hernia, healed ulcer in antrum with gastritis, mild duodenitis in bulb; hgb 6.5  Status post 4 units pRBC's (3.6 on admission)  · Hypokalemia      Patient Active Problem List   Diagnosis Code    Hiatal hernia K44.9    History of seasonal allergies Z88.9    Depression F32. A    Anxiety F41.9    Osteoarthritis M19.90    Genital herpes A60.00    S/P arthroscopy of shoulder Z98.890    S/P cholecystectomy Z90.49    S/P LUCA-BSO Z90.710, Z90.722, Z90.79    S/P hemorrhoidectomy Z98.890, Z87.19    Neuropathy G62.9    Screen for colon cancer Z12.11    History of screening mammography Z92.89    S/P endoscopy Z98.890    Hyperlipidemia E78.5    Vitamin D deficiency E55.9    H/O bone density study Z92.89    DDD (degenerative disc disease), cervical M50.30    Advance directive discussed with patient Z70.80    Status post surgery Z98.890    S/P lumbar fusion Z98.1    GI bleed K92.2     Plan:     · PPI  · Avoid NSAID's  · Monitor CBC  · Outpatient GI follow-up for colonoscopy with Dr. Ed Gray By: Davonte Torrez NP     6/13/2022  2:11 PM     GI attending note:    Chart reviewed. Agree with note of JACQUELINE. Will sign off. Please call with any questions. Outpatient GI follow-up with Dr. Alva Rodriguez.     Dr. Margarita Gallagher

## 2022-06-14 VITALS
TEMPERATURE: 98.5 F | OXYGEN SATURATION: 97 % | HEIGHT: 60 IN | DIASTOLIC BLOOD PRESSURE: 99 MMHG | WEIGHT: 180.3 LBS | RESPIRATION RATE: 12 BRPM | SYSTOLIC BLOOD PRESSURE: 129 MMHG | HEART RATE: 101 BPM | BODY MASS INDEX: 35.4 KG/M2

## 2022-06-14 LAB
ABO + RH BLD: NORMAL
ANION GAP SERPL CALC-SCNC: 7 MMOL/L (ref 5–15)
BASOPHILS # BLD: 0 K/UL (ref 0–0.1)
BASOPHILS NFR BLD: 0 % (ref 0–1)
BLD PROD TYP BPU: NORMAL
BLOOD GROUP ANTIBODIES SERPL: NORMAL
BPU ID: NORMAL
BUN SERPL-MCNC: 5 MG/DL (ref 6–20)
BUN/CREAT SERPL: 7 (ref 12–20)
CALCIUM SERPL-MCNC: 8.9 MG/DL (ref 8.5–10.1)
CHLORIDE SERPL-SCNC: 110 MMOL/L (ref 97–108)
CO2 SERPL-SCNC: 26 MMOL/L (ref 21–32)
CREAT SERPL-MCNC: 0.76 MG/DL (ref 0.55–1.02)
CROSSMATCH RESULT,%XM: NORMAL
DIFFERENTIAL METHOD BLD: ABNORMAL
EOSINOPHIL # BLD: 0.2 K/UL (ref 0–0.4)
EOSINOPHIL NFR BLD: 3 % (ref 0–7)
ERYTHROCYTE [DISTWIDTH] IN BLOOD BY AUTOMATED COUNT: 19.7 % (ref 11.5–14.5)
ERYTHROCYTE [DISTWIDTH] IN BLOOD BY AUTOMATED COUNT: 20.4 % (ref 11.5–14.5)
GLUCOSE SERPL-MCNC: 91 MG/DL (ref 65–100)
HCT VFR BLD AUTO: 13.2 % (ref 35–47)
HCT VFR BLD AUTO: 27.5 % (ref 35–47)
HGB BLD-MCNC: 3.6 G/DL (ref 11.5–16)
HGB BLD-MCNC: 8.3 G/DL (ref 11.5–16)
IMM GRANULOCYTES # BLD AUTO: 0.1 K/UL
IMM GRANULOCYTES NFR BLD AUTO: 1 %
LYMPHOCYTES # BLD: 2.9 K/UL (ref 0.8–3.5)
LYMPHOCYTES NFR BLD: 44 % (ref 12–49)
MCH RBC QN AUTO: 20.2 PG (ref 26–34)
MCH RBC QN AUTO: 24.3 PG (ref 26–34)
MCHC RBC AUTO-ENTMCNC: 27.3 G/DL (ref 30–36.5)
MCHC RBC AUTO-ENTMCNC: 30.2 G/DL (ref 30–36.5)
MCV RBC AUTO: 74.2 FL (ref 80–99)
MCV RBC AUTO: 80.6 FL (ref 80–99)
MONOCYTES # BLD: 0.7 K/UL (ref 0–1)
MONOCYTES NFR BLD: 10 % (ref 5–13)
NEUTS SEG # BLD: 2.9 K/UL (ref 1.8–8)
NEUTS SEG NFR BLD: 42 % (ref 32–75)
NRBC # BLD: 0.02 K/UL (ref 0–0.01)
NRBC # BLD: 0.05 K/UL (ref 0–0.01)
NRBC BLD-RTO: 0.3 PER 100 WBC
NRBC BLD-RTO: 0.7 PER 100 WBC
PATH REV BLD -IMP: ABNORMAL
PLATELET # BLD AUTO: 460 K/UL (ref 150–400)
PLATELET # BLD AUTO: 589 K/UL (ref 150–400)
PMV BLD AUTO: 9.1 FL (ref 8.9–12.9)
PMV BLD AUTO: 9.2 FL (ref 8.9–12.9)
POTASSIUM SERPL-SCNC: 3.4 MMOL/L (ref 3.5–5.1)
RBC # BLD AUTO: 1.78 M/UL (ref 3.8–5.2)
RBC # BLD AUTO: 3.41 M/UL (ref 3.8–5.2)
RBC MORPH BLD: ABNORMAL
SODIUM SERPL-SCNC: 143 MMOL/L (ref 136–145)
SPECIMEN EXP DATE BLD: NORMAL
STATUS OF UNIT,%ST: NORMAL
UNIT DIVISION, %UDIV: 0
WBC # BLD AUTO: 6.8 K/UL (ref 3.6–11)
WBC # BLD AUTO: 7.9 K/UL (ref 3.6–11)

## 2022-06-14 PROCEDURE — 74011250637 HC RX REV CODE- 250/637: Performed by: INTERNAL MEDICINE

## 2022-06-14 PROCEDURE — 74011250637 HC RX REV CODE- 250/637: Performed by: STUDENT IN AN ORGANIZED HEALTH CARE EDUCATION/TRAINING PROGRAM

## 2022-06-14 PROCEDURE — 85027 COMPLETE CBC AUTOMATED: CPT

## 2022-06-14 PROCEDURE — 74011250637 HC RX REV CODE- 250/637: Performed by: HOSPITALIST

## 2022-06-14 PROCEDURE — 80048 BASIC METABOLIC PNL TOTAL CA: CPT

## 2022-06-14 PROCEDURE — 74011000250 HC RX REV CODE- 250: Performed by: STUDENT IN AN ORGANIZED HEALTH CARE EDUCATION/TRAINING PROGRAM

## 2022-06-14 PROCEDURE — 97535 SELF CARE MNGMENT TRAINING: CPT

## 2022-06-14 PROCEDURE — 36415 COLL VENOUS BLD VENIPUNCTURE: CPT

## 2022-06-14 RX ORDER — POTASSIUM CHLORIDE 750 MG/1
10 TABLET, FILM COATED, EXTENDED RELEASE ORAL
Status: COMPLETED | OUTPATIENT
Start: 2022-06-14 | End: 2022-06-14

## 2022-06-14 RX ADMIN — SODIUM CHLORIDE, PRESERVATIVE FREE 10 ML: 5 INJECTION INTRAVENOUS at 07:07

## 2022-06-14 RX ADMIN — POTASSIUM CHLORIDE 10 MEQ: 750 TABLET, FILM COATED, EXTENDED RELEASE ORAL at 10:54

## 2022-06-14 RX ADMIN — ACETAMINOPHEN 650 MG: 325 TABLET ORAL at 08:30

## 2022-06-14 RX ADMIN — ATORVASTATIN CALCIUM 20 MG: 20 TABLET, FILM COATED ORAL at 08:31

## 2022-06-14 RX ADMIN — PANTOPRAZOLE SODIUM 40 MG: 40 TABLET, DELAYED RELEASE ORAL at 07:07

## 2022-06-14 RX ADMIN — FERROUS SULFATE TAB 325 MG (65 MG ELEMENTAL FE) 325 MG: 325 (65 FE) TAB at 08:31

## 2022-06-14 RX ADMIN — LISINOPRIL 5 MG: 5 TABLET ORAL at 08:31

## 2022-06-14 NOTE — DISCHARGE INSTRUCTIONS
You have been evaluated and treated for GI bleeding  GI consulted, you had EGD:   EGD 6/11/22: moderate hiatal hernia, healed ulcer in antrum with gastritis, mild duodenitis in bulb; hgb 6.5  Status post 4 units pRBC's (3.6 on admission)  Biopsy obtained and penidng  Anemia was corrected with transfusion PRBC  Per GI was reccommended Outpatient GI follow-up for colonoscopy with Dr. Silvia Washington, \[facility] [place] [facility]\"" NSSAIDs

## 2022-06-14 NOTE — PROGRESS NOTES
Transition of Care  1. RUR 13% Low  2. Disposition Home  3. DME Walker, wheelchair  4. Transportation Family   5. Follow Up PCP, Specialist      CM provided outpatient therapy script for [facility]. There are no other needs at this time. Medicare pt has received, reviewed, and signed 2nd IM letter informing them of their right to appeal the discharge. Signed copy has been placed on pt bedside chart.     Chioma Bernal MS
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
## 2022-06-14 NOTE — PROGRESS NOTES
Problem: Self Care Deficits Care Plan (Adult)  Goal: *Acute Goals and Plan of Care (Insert Text)  Description: Occupational Therapy Goals  Initiated: 6/12/2022   1. Patient will perform grooming with mod I standing at the sink within 7 day(s). 2.  Patient will perform bathing with setup/supervision chair within 7 day(s). 3.  Patient will perform upper body dressing and lower body dressing with setup/supervision within 7 day(s). 4.  Patient will perform toilet transfers with supervision within 7 day(s). 5.  Patient will perform all aspects of toileting with supervision within 7 day(s). FUNCTIONAL STATUS PRIOR TO ADMISSION: Pt was recently admitted for spinal surgery back in March 2022. Post-operatively she was noted to have left foot drop and discharged to [facility] setting. She was fitted for an AFO and discharged at w/c level. She reports she was able to progress with HHPT and HHOT for increased mobility tasks at home and has been working on ambulation. She also reports her AFO is causing her toes to rub on the top of her shoe making her toes sore. She reports she is to go back to orthotist to re-evaluate the fit of the AFO. HOME SUPPORT: Has support from her grand-daughter and daughter. Outcome: Progressing Towards Goal  OCCUPATIONAL THERAPY TREATMENT  Patient: Ciaran Salcedo (78 y.o. female)  Date: 6/14/2022  Diagnosis: GI bleed [K92.2] <principal problem not specified>  Procedure(s) (LRB):  ESOPHAGOGASTRODUODENOSCOPY (EGD) with biopsy (N/A) 3 Days Post-Op  Precautions: Fall  Chart, occupational therapy assessment, plan of care, and goals were reviewed. ASSESSMENT  Patient continues with skilled OT services and is progressing towards goals. Patient completed all ADLs with supervision-setup while sitting/standing at EOB. Patient completed functional mobility/transfers with supervision using RW. Patient left sitting EOB with call bell in reach, RN aware, all needs met.  Will continue to follow but anticipate patient can go home with increased assist from granddaughter. Current Level of Function Impacting Discharge (ADLs): setup-supervision    Other factors to consider for discharge: none         PLAN :  Patient continues to benefit from skilled intervention to address the above impairments. Continue treatment per established plan of care to address goals. Recommend with staff: Recommend with nursing, ADLs with supervision/setup, OOB to chair 3x/day via rolling walker and toileting via beside commode and functional mobility to and from bathroom. Thank you for completing as able in order to maintain patient strength, endurance and independence. Recommend next OT session: toileting in bathroom    Recommendation for discharge: (in order for the patient to meet his/her long term goals)  No skilled occupational therapy/ follow up rehabilitation needs identified at this time. This discharge recommendation:  Has been made in collaboration with the attending provider and/or case management    IF patient discharges home will need the following DME: patient owns DME required for discharge       SUBJECTIVE:   Patient stated I am going home today.     OBJECTIVE DATA SUMMARY:   Cognitive/Behavioral Status:  Neurologic State: Alert  Orientation Level: Oriented X4  Cognition: Appropriate for age attention/concentration; Follows commands  Perception: Appears intact  Perseveration: No perseveration noted  Safety/Judgement: Awareness of environment; Insight into deficits    Functional Mobility and Transfers for ADLs:  Bed Mobility:   NT this session    Transfers:  Sit to Stand: Supervision  Functional Transfers  Toilet Transfer : Supervision  Adaptive Equipment: [employer] (comment); Bedside commode       Balance:  Sitting: Intact  Standing: Impaired; With support  Standing - Static: Good  Standing - Dynamic : Good;Fair    ADL Intervention:     Grooming  Position Performed: Seated edge of bed  Washing Face: Set-up  Brushing Teeth: Set-up    Upper Body Bathing  Bathing Assistance: Set-up  Position Performed: Seated edge of bed    Lower Body Bathing  Perineal  : Supervision  Position Performed: Standing  Cues: Verbal cues provided  Adaptive Equipment: [address]; Wipes(personal cleansing cloth)  Lower Body : Set-up  Position Performed: Seated edge of bed  Adaptive Equipment: Walker    Lower Body Dressing Assistance  Underpants: Supervision  Pants With Elastic Waist: Supervision  Leg Crossed Method Used: No  Position Performed: Seated edge of bed  Cues: Don  Adaptive Equipment Used: Walker    Toileting  Toileting Assistance: Modified independent  Bladder Hygiene: Independent  Bowel Hygiene: Independent  Clothing Management: Supervision  Adaptive Equipment: Walker    Cognitive Retraining  Safety/Judgement: Awareness of environment; Insight into deficits    Pain:  No pain reported    Activity Tolerance:   Good    After treatment patient left in no apparent distress:   Call bell within reach, Side rails x 3, and RN aware, sitting EOB    COMMUNICATION/COLLABORATION:   The patients plan of care was discussed with: Physical therapist and Registered nurse.      Soumya Dey OT  Time Calculation: 28 mins

## 2022-06-14 NOTE — PROGRESS NOTES
Bedside and Verbal shift change report given to Emely Ray (oncoming nurse) by Essence Spencer (offgoing nurse).  Report included the following information SBAR, Kardex, ED Summary and Recent Result

## 2022-06-14 NOTE — PROGRESS NOTES
[address] [facility] Adult  Hospitalist Group                                                                                          Hospitalist Progress Note  Dolores Beauchamp MD  Answering service: [phone] from in house phone        Date of Service:  2022  NAME:  Norberto Eubanks  :  1945  MRN:  [id number]      Admission Summary:   Norberto Eubanks is a 78 y.o. female with past medical history of hypertension, major depressive disorder, GERD, dyslipidemia, migraine headaches , MGUS who presented to [facility] upon recommendation of outpatient hematology for anemia. Patient reports 2 weeks of lightheadedness, dizziness and intermittent cramping epigastric pain. Chart review shows a known history of gastric ulcer with H. pylori. Has any associated melena or hematochezia. The patient denies any fever, chills, chest or abdominal pain, nausea, vomiting, cough, congestion, recent illness, palpitations, or dysuria.     Remarkable vitals on ER Presentations: Vital signs stable. Labs Remarkable for: Hemoglobin 3.6, hematocrit 13.2, platelets 171, FOBT positive  ER Images: none       Interval history / Subjective:   NAD, no acute events over night, comfortable in bed  No rectal bleeding over night  Hb 6.5 in am  GI input appreciated     Assessment & Plan:     Acute blood loss anemia due to GIB,   Hgb 3.6 on admission. S/p transfusion PRBC  GI consulted,  s/p EGD:   hiatal hernia, old healed ulcer in the antrum w/ surrounding gastritis s/p biopsies, mild duodenitis in the bulb,   -s/p 3 U PRBC  -d/c IVF  -change PPI to PO   -monitor H/H, transfuse for Hgb<7  -GI recs appreciated  Continue to monitor H&H  Hb yesterday 8.5  F/u gastric Bx, pending  S/p transfusion PRBC 2022  Repeat Hb pedning    Acute GI bleeding, possible upper  GI bleeding due to Gastritis.   S/p EGD as above, Bx pending, no acute bleeding over night  Continue PPI BID  F/u gastric biopsy  Will need colonoscopy as outpatient  GI input appreciated, noted reviewed  Pathology:  Mild reactive gastropathy and focal mild chronic active gastritis. Intestinal metaplasia present; no dysplasia identified. HTN:   continue lisinopril     Hypokalemia:  Replace with KCL     Hyperlipidemia  History of migraines  MDD  MGUS       Code status: Full code  Prophylaxis: SCD  Care Plan discussed with: patient  Anticipated Disposition: possible home today, f/u H&H,  PT outpt     Hospital Problems  Date Reviewed: 6/11/2022          Codes Class Noted POA    GI bleed ICD-10-CM: K92.2  ICD-9-CM: 578.9  6/10/2022 Unknown                Review of Systems:   Pertinent items are noted in HPI. Vital Signs:    Last 24hrs VS reviewed since prior progress note. Most recent are:  Visit Vitals  BP (!) 149/63 (BP 1 Location: Left upper arm, BP Patient Position: Sitting)   Pulse 94   Temp 98.7 °F (37.1 °C)   Resp 16   Ht 5' (1.524 m)   Wt 81.8 kg (180 lb 4.8 oz)   SpO2 92%   BMI 35.21 kg/m²         Intake/Output Summary (Last 24 hours) at 6/14/2022 1008  Last data filed at 6/13/2022 1200  Gross per 24 hour   Intake 508.8 ml   Output --   Net 508.8 ml        Physical Examination:     I had a face to face encounter with this patient and independently examined them on 6/14/2022 as outlined below:          Constitutional:  No acute distress, cooperative, pleasant    ENT:  Oral mucosa moist, oropharynx benign. Resp:  CTA bilaterally. No wheezing/rhonchi/rales. No accessory muscle use. CV:  Regular rhythm, normal rate, no murmurs, gallops, rubs    GI:  Soft, non distended, non tender. normoactive bowel sounds, no hepatosplenomegaly     Musculoskeletal:  No edema, warm, 2+ pulses throughout    Neurologic:  Moves all extremities.   AAOx3, CN II-XII reviewed            Data Review:    Review and/or order of clinical lab test      Labs:     Recent Labs     06/13/22  1401 06/13/22  0203 06/12/22  0009 06/12/22  0009   WBC  --  9.1  --  8.5 HGB 8.5* 6.5*   < > 7.1*   HCT 27.7* 21.8*   < > 22.6*   PLT  --  427*  --  462*    < > = values in this interval not displayed. Recent Labs     06/14/22  0853 06/13/22  0203 06/12/22  0009    139 141   K 3.4* 2.9* 3.5   * 110* 110*   CO2 26 26 26   BUN 5* 6 8   CREA 0.76 0.76 0.67   GLU 91 79 81   CA 8.9 8.4* 8.3*   MG  --   --  1.7     No results for input(s): ALT, AP, TBIL, TBILI, TP, ALB, GLOB, GGT, AML, LPSE in the last 72 hours. No lab exists for component: SGOT, GPT, AMYP, HLPSE  No results for input(s): INR, PTP, APTT, INREXT, INREXT in the last 72 hours. No results for input(s): FE, TIBC, PSAT, FERR in the last 72 hours. No results found for: FOL, RBCF   No results for input(s): PH, PCO2, PO2 in the last 72 hours. No results for input(s): CPK, CKNDX, TROIQ in the last 72 hours.     No lab exists for component: CPKMB  Lab Results   Component Value Date/Time    Cholesterol, total 217 (H) 03/29/2017 11:55 AM    HDL Cholesterol 53 03/29/2017 11:55 AM    LDL, calculated 138 (H) 03/29/2017 11:55 AM    Triglyceride 130 03/29/2017 11:55 AM    CHOL/HDL Ratio 3.5 08/29/2009 09:02 AM     Lab Results   Component Value Date/Time    Glucose (POC) 91 06/12/2022 05:20 PM    Glucose (POC) 85 06/12/2022 11:50 AM    Glucose (POC) 93 03/08/2022 07:16 AM    Glucose (POC) 73 10/11/2010 11:13 AM     Lab Results   Component Value Date/Time    Color YELLOW/STRAW 03/01/2022 11:57 AM    Appearance CLEAR 03/01/2022 11:57 AM    Specific gravity 1.025 03/01/2022 11:57 AM    pH (UA) 5.5 03/01/2022 11:57 AM    Protein Negative 03/01/2022 11:57 AM    Glucose Negative 03/01/2022 11:57 AM    Ketone TRACE (A) 03/01/2022 11:57 AM    Bilirubin Negative 03/01/2022 11:57 AM    Urobilinogen 0.2 03/01/2022 11:57 AM    Nitrites Negative 03/01/2022 11:57 AM    Leukocyte Esterase Negative 03/01/2022 11:57 AM    Epithelial cells FEW 03/01/2022 11:57 AM    Bacteria Negative 03/01/2022 11:57 AM    WBC 0-4 03/01/2022 11:57 AM RBC 0-5 03/01/2022 11:57 AM         Medications Reviewed:     Current Facility-Administered Medications   Medication Dose Route Frequency    potassium chloride SR (KLOR-CON 10) tablet 10 mEq  10 mEq Oral NOW    0.9% sodium chloride infusion 250 mL  250 mL IntraVENous PRN    0.9% sodium chloride infusion 250 mL  250 mL IntraVENous PRN    pantoprazole (PROTONIX) tablet 40 mg  40 mg Oral ACB&D    ferrous sulfate tablet 325 mg  1 Tablet Oral BID WITH MEALS    lisinopriL (PRINIVIL, ZESTRIL) tablet 5 mg  5 mg Oral DAILY    0.9% sodium chloride infusion 250 mL  250 mL IntraVENous PRN    0.9% sodium chloride infusion 250 mL  250 mL IntraVENous PRN    atorvastatin (LIPITOR) tablet 20 mg  20 mg Oral QAM    buPROPion SR (WELLBUTRIN SR) tablet 150 mg  150 mg Oral QHS    sodium chloride (NS) flush 5-40 mL  5-40 mL IntraVENous Q8H    sodium chloride (NS) flush 5-40 mL  5-40 mL IntraVENous PRN    acetaminophen (TYLENOL) tablet 650 mg  650 mg Oral Q6H PRN    Or    acetaminophen (TYLENOL) suppository 650 mg  650 mg Rectal Q6H PRN    polyethylene glycol (MIRALAX) packet 17 g  17 g Oral DAILY PRN    ondansetron (ZOFRAN ODT) tablet 4 mg  4 mg Oral Q8H PRN    Or    ondansetron (ZOFRAN) injection 4 mg  4 mg IntraVENous Q6H PRN    0.9% sodium chloride infusion 250 mL  250 mL IntraVENous PRN     ______________________________________________________________________  EXPECTED LENGTH OF STAY: - - -  ACTUAL LENGTH OF STAY:          4                 Munir De La Paz MD

## 2022-06-14 NOTE — PROGRESS NOTES
Physical Therapy  6/14/2022    Chart reviewed and RN cleared pt. Pt received sitting EOB, just completed OT. Pt declines therapy at this time and stair training. Pt feels comfortable w/ mobility w/ RW; reports owning RW and w/c. Pt to have OPPT at d/c. Will follow up w/ pt if there are changes in d/c plans. Pt has no further concerns/questions regarding therapy.        Mildred Means, PTA

## 2022-06-14 NOTE — PROGRESS NOTES
Problem: Patient Education: Go to Patient Education Activity  Goal: Patient/Family Education  Outcome: Progressing Towards Goal     Problem: Falls - Risk of  Goal: *Absence of Falls  Description: Document Samara Mcburney Fall Risk and appropriate interventions in the flowsheet.   Outcome: Progressing Towards Goal  Note: Fall Risk Interventions:  Mobility Interventions: Bed/chair exit alarm         Medication Interventions: Assess postural VS orthostatic hypotension    Elimination Interventions: Call light in reach    History of Falls Interventions: Bed/chair exit alarm         Problem: Patient Education: Go to Patient Education Activity  Goal: Patient/Family Education  Outcome: Progressing Towards Goal     Problem: Anemia Care Plan (Adult and Pediatric)  Goal: *Labs within defined limits  Outcome: Progressing Towards Goal     Problem: Patient Education: Go to Patient Education Activity  Goal: Patient/Family Education  Outcome: Progressing Towards Goal

## 2022-06-14 NOTE — PROGRESS NOTES
Pt discharged home. Pt AO x 4. All belongings returned to pt. Pts grand-daughter picked up pt. Pt has no new medications ordered for discharge. Pt has to make follow up appt with primary doctor. Pt verbalized understanding. Pt IV taken out x 2. Pt hgb was 8.3 today and potassium was replaced for 3.4 K wit oral potassium replacement prior to discharge    I have reviewed discharge instructions with the patient Angelia Odonnell. The patient Angelia Odonnell verbalized understanding.

## 2022-06-15 ENCOUNTER — TRANSCRIBE ORDER (OUTPATIENT)
Dept: SCHEDULING | Age: 77
End: 2022-06-15

## 2022-06-15 ENCOUNTER — PATIENT OUTREACH (OUTPATIENT)
Dept: CASE MANAGEMENT | Age: 77
End: 2022-06-15

## 2022-06-15 DIAGNOSIS — D47.2 MONOCLONAL PARAPROTEINEMIA: Primary | ICD-10-CM

## 2022-06-15 RX ORDER — DICLOFENAC SODIUM 75 MG/1
75 TABLET, DELAYED RELEASE ORAL 2 TIMES DAILY
COMMUNITY

## 2022-06-15 RX ORDER — DULOXETIN HYDROCHLORIDE 30 MG/1
30 CAPSULE, DELAYED RELEASE ORAL 2 TIMES DAILY
COMMUNITY

## 2022-06-15 RX ORDER — PREGABALIN 75 MG/1
75 CAPSULE ORAL 2 TIMES DAILY
COMMUNITY

## 2022-06-15 NOTE — PROGRESS NOTES
Care Transitions Initial Call    Call within 2 business days of discharge: Yes     Patient: Vivian Wright Patient : 1945 MRN: [id number]    [facility]       Complaint Diagnosis Description Type Department Provider    6/10/22 Transfer Symptomatic anemia . .. ED to Hosp-Admission (Discharged) (ADMIT) Ubaldo Perez MD; Ariel Kathleen. .. 6/10/22 Abnormal Lab Results Gastrointestinal hemorrhage, unspecified gastrointestinal hemorrhage type . .. ED (TRANSFER) Veronica Santos MD          Was this an external facility discharge? No     Challenges to be reviewed by the provider   Additional needs identified to be addressed with provider: yes  Medication Rec.(pt.states she also takes these meds listed). Diclofenac 75 mg (pt.will follow up with PCP)  Xarelto 20 mg (pt.will follow up with PCP)  Duloxetine 30 mg     Method of communication with provider : phone     Discussed COVID-19 related testing which was not done at this time. Advance Care Planning:   Does patient have an Advance Directive: Healthcare Decision maker on file    Inpatient Readmission Risk score: Unplanned Readmit Risk Score: 11.7 ( )    Was this a readmission? no   Patient stated reason for the admission: low Hgb 3.6    Patients top risk factors for readmission: medical condition-GIB   Interventions to address risk factors: Scheduled appointment with PCP-(pt.will schedule appt). , Scheduled appointment with Galen Roblero Dr, Obtained and reviewed discharge summary and/or continuity of care documents, Education of patient/family/caregiver/guardian to support self-management-GIB and resource/[facility]    Care Transition Nurse (CTN) contacted the patient by telephone to perform post hospital discharge assessment. Verified name and  with patient as identifiers. Provided introduction to self, and explanation of the CTN role.      CTN reviewed discharge instructions, medical action plan and red flags with patient who verbalized understanding. Were discharge instructions available to patient? yes. Reviewed appropriate site of care based on symptoms and resources available to patient including: PCP, Specialist, When to call 911 and [facility]. Patient given an opportunity to ask questions and does not have any further questions or concerns at this time. The patient agrees to contact the PCP office for questions related to their healthcare. Medication reconciliation was performed with patient, who verbalizes understanding of administration of home medications. Advised obtaining a 90-day supply of all daily and as-needed medications. Referral to Pharm D needed: no     Was patient discharged with a pulse oximeter? no    Discussed follow-up appointments. If no appointment was previously scheduled, appointment scheduling offered: yes. Is follow up appointment scheduled within 7 days of discharge? pending, pt.will call to schedule. [facility] follow up appointment(s): No future appointments. Plan for follow-up call in 5-7 days based on severity of symptoms and risk factors. Plan for next call: self management-GIB and follow up appointment-pcp/medication  CTN provided contact information for future needs. Goals Addressed                 This Visit's Progress     Establish PCP relationships and regularly scheduled appointments. 6/15 Pt.will attend all recommended follow ups with pcp and specialist w/n 30 day period. Pt.scheduled hosp.follow up:  -PCP, He Jaimes Md (Xochitl reports earliest appt).-7/6/22  -(GI) Dr. Gavin Burrell 9/2/22  CTN provided pt. information [employer], explain briefly type of service; contact information provided, f/u with pt.in 5-7days. -[facility]                           Understands red flags post discharge. 6/15 Pt.reports symptoms weakness, sob, dizziness prior to hospitalization, reports dizziness resolved, continues to feel weaken which has improved.  Pt.reports pt.taking medication as directed, (Teach Back):  report s/s bleeding such vomiting secretions resembling coffee grounds in texture, black stool, dizziness, fast heartbeat, SOB, tightness in chest or ABD pain. Pt will notify PCP office immediately of any of these symptoms or go to ED for eval. CTN contact information provided, will f/u in 5-7 days. -[address]

## 2022-06-15 NOTE — PROGRESS NOTES
Physician Progress Note      PATIENT:               Candis Jimenez  CSN #:                  [id number]  :                       1945  ADMIT DATE:       6/10/2022 8:32 PM  [address] DATE:        2022 1:33 PM  RESPONDING  PROVIDER #:        Mmee Nix MD          QUERY TEXT:    Patient admitted with GI bleeding, noted to have Gastritis without bleeding and Duodenitis  If possible, please document in progress notes and discharge summary the cause of the GI bleeding: The medical record reflects the following:  Risk Factors: GIB  Clinical Indicators:  EGD  Post-procedure Diagnoses  Gastritis without bleeding, unspecified chronicity, unspecified gastritis type  Duodenitis      Treatment: EGD, GI consult, protonix IV    Thank you,  Lisa Wilson RN, CDI, CRCR, CCDS  Certified Clinical Documentation Integrity  Specialist  [phone]  You can also contact me via [address] [place] [address]. Options provided:  -- GI bleeding due to Gastritis  -- GI bleeding due to Duodenitis  -- Other - I will add my own diagnosis  -- Disagree - Not applicable / Not valid  -- Disagree - Clinically unable to determine / Unknown  -- Refer to Clinical Documentation Reviewer    PROVIDER RESPONSE TEXT:    This patient has GI bleeding due to Gastritis.     Query created by: Alyssa Napier on 6/15/2022 3:10 PM      Electronically signed by:  Meme Nix MD 6/15/2022 3:52 PM

## 2022-06-27 ENCOUNTER — PATIENT OUTREACH (OUTPATIENT)
Dept: CASE MANAGEMENT | Age: 77
End: 2022-06-27

## 2022-06-27 NOTE — PROGRESS NOTES
Care Transitions Follow Up Call    Challenges to be reviewed by the provider   Additional needs identified to be addressed with provider: no         Method of communication with provider : none    Care Transition Nurse (CTN) contacted the patient by telephone to follow up after admission on 6/10/22. Verified name and  with patient as identifiers. Addressed changes since last contact: follow up with Hematologist 22  Follow up appointment completed? yes. Was follow up appointment scheduled within 7 days of discharge? yes. Patients top risk factors for readmission: medical condition-GIB,    Interventions to address risk factors: Scheduled appointment with PCP-22, Scheduled appointment with Specialist-22, Obtained and reviewed discharge summary and/or continuity of care documents and Education of patient/family/caregiver/guardian to support self-management-[facility] follow up appointment(s): No future appointments. CTN provided contact information for future needs. Plan for follow-up call in 10-14 days based on severity of symptoms and risk factors. Plan for next call: 10-14 days, GIB. Goals Addressed                 This Visit's Progress     Establish PCP relationships and regularly scheduled appointments. 6/15 Pt.will attend all recommended follow ups with pcp and specialist w/n 30 day period. Pt.scheduled hosp.follow up:  -PCP, Geovanny Hooker Md (Xochitl reports earliest appt).-22  -(GI) Dr. Lara Speak 22  CTN provided pt. information [employer], explain briefly type of service; contact information provided, f/u with pt.in 5-7days. - [address]       6/15 Pt. hosp.follow up with PCP, Kelsie Matias MD 22 @ 2:30 pm; pt.aware. - [address]     Pt.reports attends appts. as scheduled, seen by Neurologist , blood work; reports will follow up with Hematologist on 22. (protein in blood). CTN provided pt. information [employer], contact information provided, f/u with pt.in 7-14 days.-                                        Understands red flags post discharge. 6/15 Pt.reports symptoms weakness, sob, dizziness prior to hospitalization, reports dizziness resolved, continues to feel weaken which has improved. Pt.reports pt.taking medication as directed, (Teach Back):  report s/s bleeding such vomiting secretions resembling coffee grounds in texture, black stool, dizziness, fast heartbeat, SOB, tightness in chest or ABD pain. Pt will notify PCP office immediately of any of these symptoms or go to ED for eval. CTN contact information provided, will f/u in 5-7 days. -[facility]     6/27 Pt.without s/s bleeding such vomiting secretions resembling coffee grounds in texture, black stool, dizziness, fast heartbeat, SOB, tightness in chest or ABD pain. Pt will notify PCP office immediately of any of these symptoms or go to ED for eval. CTN contact information provided, will f/u in 7-14 days. -[facility]

## 2023-04-19 ENCOUNTER — HOSPITAL ENCOUNTER (EMERGENCY)
Age: 78
Discharge: HOME | End: 2023-04-19
Attending: STUDENT IN AN ORGANIZED HEALTH CARE EDUCATION/TRAINING PROGRAM
Payer: MEDICARE

## 2023-04-19 VITALS
RESPIRATION RATE: 16 BRPM | DIASTOLIC BLOOD PRESSURE: 87 MMHG | SYSTOLIC BLOOD PRESSURE: 163 MMHG | HEART RATE: 96 BPM | TEMPERATURE: 98.3 F | OXYGEN SATURATION: 100 %

## 2023-04-19 DIAGNOSIS — H10.13 ALLERGIC CONJUNCTIVITIS OF BOTH EYES: Primary | ICD-10-CM

## 2023-04-19 DIAGNOSIS — R09.81 NASAL CONGESTION: ICD-10-CM

## 2023-04-19 LAB — DEPRECATED S PYO AG THROAT QL EIA: NEGATIVE

## 2023-04-19 PROCEDURE — 99283 EMERGENCY DEPT VISIT LOW MDM: CPT

## 2023-04-19 PROCEDURE — 74011250637 HC RX REV CODE- 250/637: Performed by: STUDENT IN AN ORGANIZED HEALTH CARE EDUCATION/TRAINING PROGRAM

## 2023-04-19 PROCEDURE — 87880 STREP A ASSAY W/OPTIC: CPT

## 2023-04-19 PROCEDURE — 74011250636 HC RX REV CODE- 250/636: Performed by: STUDENT IN AN ORGANIZED HEALTH CARE EDUCATION/TRAINING PROGRAM

## 2023-04-19 PROCEDURE — 87070 CULTURE OTHR SPECIMN AEROBIC: CPT

## 2023-04-19 RX ORDER — DEXAMETHASONE 4 MG/1
10 TABLET ORAL
Status: COMPLETED | OUTPATIENT
Start: 2023-04-19 | End: 2023-04-19

## 2023-04-19 RX ORDER — ACETAMINOPHEN 325 MG/1
650 TABLET ORAL ONCE
Status: COMPLETED | OUTPATIENT
Start: 2023-04-19 | End: 2023-04-19

## 2023-04-19 RX ADMIN — DEXAMETHASONE 10 MG: 4 TABLET ORAL at 06:06

## 2023-04-19 RX ADMIN — ACETAMINOPHEN 650 MG: 325 TABLET, FILM COATED ORAL at 06:06

## 2023-04-19 NOTE — ED TRIAGE NOTES
Triage: Pt arrives ambulatory from home with CC of sore throat, productive cough,and red puffy eyes. Pt has hx of seasonal allergies. Denies fever. Denies sick contacts.

## 2023-04-19 NOTE — ED PROVIDER NOTES
Bridgett Quiroga is a 78 y.o. female with past medical history notable for arthritis, gastric ulcer status post hemorrhage last year, seasonal allergies presenting with nasal congestion, intermittent epistaxis, itchy red eyes and sore throat, laryngitis and cough productive of yellowish to clear sputum. She denies dyspnea, chest pain or abdominal pain, vomiting or diarrhea. She has had a's history of seasonal allergies in the past.  She is not taking loratadine daily. Denies fevers, chills. Does endorse a headache which has been global associate with the symptoms. Symptoms of been ongoing for the past 6 days. Granddaughter noted that she had epistaxis and seemed of severe cough and so brought her in. She has been taking Tylenol without much improvement in the symptoms. The history is provided by the patient.       Past Medical History:   Diagnosis Date    Arthritis     B12 deficiency     Chronic pain     LOWER BACK    DDD (degenerative disc disease), cervical 11/2016    GERD (gastroesophageal reflux disease)     History of screening mammography 5/3/2012    Lumbar radiculopathy     Lumbar spondylosis     Lumbar stenosis     MGUS (monoclonal gammopathy of unknown significance)     Neuropathy 11/3/2011    Peripheral neuropathy     S/P arthroscopy of shoulder 11/3/2011    S/P cholecystectomy 11/3/2011    S/P hemorrhoidectomy 11/3/2011    S/P LUCA-BSO 11/3/2011    TMJ pain dysfunction syndrome     Unspecified adverse effect of anesthesia     loss of appetite after shoulder surgery       Past Surgical History:   Procedure Laterality Date    COLONOSCOPY N/A 6/25/2019    COLONOSCOPY performed by Elston Osler, MD at [facility], COLON, DIAGNOSTIC  2001    normal ([place])    HX CHOLECYSTECTOMY      HX COLOSTOMY  1985    BOWEL NICKED DURING HYSTERECTOMY REQUIRING COLOSTOMY    HX COLOSTOMY TAKE DOWN  1985    HX HYSTERECTOMY      HX SHOULDER REPLACEMENT Bilateral          Family History:   Problem Relation Age of Onset    Heart Disease Mother     Cancer Mother         liver    Hypertension Father     Stroke Father     Cancer Sister         colon    Cancer Brother         pancreatic    Breast Cancer Daughter 39    Breast Cancer Niece 61    Other Son         Dec drug OD    Anesth Problems Neg Hx        Social History     Socioeconomic History    Marital status:      Spouse name: Not on file    Number of children: Not on file    Years of education: Not on file    Highest education level: Not on file   Occupational History    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   Tobacco Use    Smoking status: Former     Packs/day: 0.25     Years: 35.00     Pack years: 8.75     Types: Cigarettes     Quit date: 3/1/2021     Years since quittin.1    Smokeless tobacco: Never   Vaping Use    Vaping Use: Never used   Substance and Sexual Activity    Alcohol use: Yes     Comment: rare    Drug use: No    Sexual activity: Never   Other Topics Concern    Not on file   Social History Narrative    Lives in [place] alone, GD goes to [employer] and visits on weekends     Social Determinants of Health     Financial Resource Strain: Not on file   Food Insecurity: Not on file   Transportation Needs: Not on file   Physical Activity: Not on file   Stress: Not on file   Social Connections: Not on file   Intimate Partner Violence: Not on file   Housing Stability: Not on file         ALLERGIES: Lactose    Review of Systems   Constitutional:  Positive for fatigue. Negative for chills and fever. Eyes:  Positive for redness. Negative for photophobia and visual disturbance. Respiratory:  Positive for cough. Negative for shortness of breath. Cardiovascular:  Negative for chest pain and leg swelling. Gastrointestinal:  Negative for abdominal pain, nausea and vomiting. Genitourinary:  Negative for dysuria. Musculoskeletal:  Negative for back pain. Neurological:  Negative for light-headedness and headaches.    Psychiatric/Behavioral: Negative for confusion. All other systems reviewed and are negative. Vitals:    04/19/23 0503   BP: (!) 163/87   Pulse: 96   Resp: 16   Temp: 98.3 °F (36.8 °C)   SpO2: 100%            Physical Exam  Constitutional:       General: She is not in acute distress. HENT:      Head: Normocephalic. Right Ear: Tympanic membrane normal.      Left Ear: Tympanic membrane normal.      Nose: Congestion and rhinorrhea present. Comments: Scant epistaxis in the nares no active bleeding     Mouth/Throat:      Comments: Some mild cobblestoning and erythema without tonsillar hypertrophy. Granddaughter states that she noticed some white patches although I do not appreciate any exudate at this time. Eyes:      Conjunctiva/sclera:      Right eye: Right conjunctiva is injected. Chemosis present. Left eye: Left conjunctiva is injected. Chemosis present. Pupils: Pupils are equal, round, and reactive to light. Comments: Slight chemosis    Cardiovascular:      Rate and Rhythm: Normal rate and regular rhythm. Pulmonary:      Effort: Pulmonary effort is normal. No respiratory distress. Breath sounds: No wheezing or rales. Abdominal:      General: There is no distension. Tenderness: There is no right CVA tenderness or left CVA tenderness. Musculoskeletal:         General: Normal range of motion. Lymphadenopathy:      Cervical: No cervical adenopathy. Skin:     General: Skin is warm. Capillary Refill: Capillary refill takes less than 2 seconds. Neurological:      General: No focal deficit present. Mental Status: She is alert and oriented to person, place, and time. Cranial Nerves: No cranial nerve deficit. Sensory: No sensory deficit. Motor: No weakness. Coordination: Coordination normal.   Psychiatric:         Behavior: Behavior normal.        Medical Decision Making  Amount and/or Complexity of Data Reviewed  Labs: ordered. Risk  OTC drugs.   Prescription drug management. Procedures    53-year-old woman with nasal congestion, cough, conjunctival injection. She has lost her voice. Her symptoms are consistent with allergies, less likely viral infection. She had a COVID test yesterday which was negative. We will try a dose of steroids. Would not continue long-term course given the history of gastric ulcer. She also was advised to take Pataday eyedrops. Oxygen sats are reassuring.

## 2023-04-19 NOTE — DISCHARGE INSTRUCTIONS
Try taking cetirizine instead of loratadine which may be more effective  Take Pataday eyedrops which are available over-the-counter for eye redness and irritation  You can use Afrin for the next 3 days  The dexamethasone you received will be effective for the next several days as well and should help with the inflammation in your throat gradually.   If you have sinus pressure please use sinus rinse she can go just

## 2023-04-21 LAB
BACTERIA SPEC CULT: NORMAL
SERVICE CMNT-IMP: NORMAL

## 2024-07-29 ENCOUNTER — HOSPITAL ENCOUNTER (OUTPATIENT)
Facility: HOSPITAL | Age: 79
Discharge: HOME | End: 2024-08-01
Payer: MEDICARE

## 2024-07-29 DIAGNOSIS — R05.9 COUGH, UNSPECIFIED TYPE: ICD-10-CM

## 2024-07-29 PROCEDURE — 74240 X-RAY XM UPR GI TRC 1CNTRST: CPT

## 2025-04-17 ENCOUNTER — HOSPITAL ENCOUNTER (OUTPATIENT)
Facility: HOSPITAL | Age: 80
Discharge: HOME | End: 2025-04-20
Attending: ORTHOPAEDIC SURGERY
Payer: MEDICARE

## 2025-04-17 DIAGNOSIS — M17.11 ARTHRITIS OF RIGHT KNEE: ICD-10-CM

## 2025-04-17 PROCEDURE — 73700 CT LOWER EXTREMITY W/O DYE: CPT

## (undated) DEVICE — Device

## (undated) DEVICE — BIOPSY NEEDLE

## (undated) DEVICE — QUILTED PREMIUM COMFORT UNDERPAD,EXTRA HEAVY: Brand: WINGS

## (undated) DEVICE — GLOVE SURG SZ 8 L12IN FNGR THK94MIL STD WHT LTX FREE

## (undated) DEVICE — Z DISCONTINUED USE 2751540 TUBING IRRIG L10IN DISP PMP ENDOGATOR

## (undated) DEVICE — CATH IV AUTOGRD BC BLU 22GA 25 -- INSYTE

## (undated) DEVICE — DRAPE,EENT,SPLIT,STERILE: Brand: MEDLINE

## (undated) DEVICE — 1.4MM X 18IN SS K-WIRE TROCAR TIP: Brand: OVERWATCH

## (undated) DEVICE — WRISTBAND ID AD W1XL11.5IN RED POLY ALRG PREPRINTED PERM

## (undated) DEVICE — TUBING HYDR IRR --

## (undated) DEVICE — 3M™ TEGADERM™ TRANSPARENT FILM DRESSING FRAME STYLE, 1626W, 4 IN X 4-3/4 IN (10 CM X 12 CM), 50/CT 4CT/CASE: Brand: 3M™ TEGADERM™

## (undated) DEVICE — SET ADMIN 16ML TBNG L100IN 2 Y INJ SITE IV PIGGY BK DISP

## (undated) DEVICE — 1010 S-DRAPE TOWEL DRAPE 10/BX: Brand: STERI-DRAPE™

## (undated) DEVICE — INSULATED BLADE ELECTRODE: Brand: EDGE

## (undated) DEVICE — SUTURE VCRL 1 L27IN ABSRB CT BRAID COAT UD J281H

## (undated) DEVICE — COVER,MAYO STAND,STERILE: Brand: MEDLINE

## (undated) DEVICE — BONE WAX WHITE: Brand: BONE WAX WHITE

## (undated) DEVICE — NEONATAL-ADULT SPO2 SENSOR: Brand: NELLCOR

## (undated) DEVICE — GUIDEWIRE

## (undated) DEVICE — NEEDLE SPNL 22GA L3.5IN BLK HUB S STL REG WALL FIT STYL W/

## (undated) DEVICE — Z DISCONTINUED NO SUB IDED SET EXTN W/ 4 W STPCOCK M SPIN LOK 36IN

## (undated) DEVICE — FLOSEAL HEMOSTATIC MATRIX, 10ML: Brand: FLOSEAL HEMOSTATIC MATRIX

## (undated) DEVICE — KENDALL RADIOLUCENT FOAM MONITORING ELECTRODE -RECTANGULAR SHAPE: Brand: KENDALL

## (undated) DEVICE — 1016 S-DRAPE IRRIG POUCH 10/BOX: Brand: STERI-DRAPE™

## (undated) DEVICE — 1.4MM X 24IN SS K-WIRE BLUNT TIP: Brand: OVERWATCH

## (undated) DEVICE — SYRINGE MED 20ML STD CLR PLAS LUERLOCK TIP N CTRL DISP

## (undated) DEVICE — FORCEPS BX L240CM JAW DIA2.8MM L CAP W/ NDL MIC MESH TOOTH

## (undated) DEVICE — CONNECTOR TBNG AUX H2O JET DISP FOR OLY 160/180 SER

## (undated) DEVICE — ENDO CARRY-ON PROCEDURE KIT INCLUDES ENZYMATIC SPONGE, GAUZE, BIOHAZARD LABEL, TRAY, LUBRICANT, DIRTY SCOPE LABEL, WATER LABEL, TRAY, DRAWSTRING PAD, AND DEFENDO 4-PIECE KIT.: Brand: ENDO CARRY-ON PROCEDURE KIT

## (undated) DEVICE — DRESSING POSTOP AG PRISMASEAL 3.5X6IN

## (undated) DEVICE — 4-PORT MANIFOLD: Brand: NEPTUNE 2

## (undated) DEVICE — 1200 GUARD II KIT W/5MM TUBE W/O VAC TUBE: Brand: GUARDIAN

## (undated) DEVICE — CANN NASAL O2 CAPNOGRAPHY AD -- FILTERLINE

## (undated) DEVICE — AIRLIFE™ U/CONNECT-IT OXYGEN TUBING 7 FEET (2.1 M) CRUSH-RESISTANT OXYGEN TUBING, VINYL TIPPED: Brand: AIRLIFE™

## (undated) DEVICE — SYR 10ML LUER LOK 1/5ML GRAD --

## (undated) DEVICE — COVER,TABLE,HEAVY DUTY,60"X90",STRL: Brand: MEDLINE

## (undated) DEVICE — SUTURE VCRL 2-0 L27IN ABSRB UD CP-2 L26MM 1/2 CIR REV CUT J869H

## (undated) DEVICE — LAMINECTOMY-SMH: Brand: MEDLINE INDUSTRIES, INC.

## (undated) DEVICE — SUTURE MCRYL SZ 4-0 L27IN ABSRB UD L19MM PS-2 1/2 CIR PRIM Y426H

## (undated) DEVICE — DERMABOND SKIN ADH 0.7ML -- DERMABOND ADVANCED 12/BX

## (undated) DEVICE — NEEDLE HYPO 18GA L1.5IN PNK S STL HUB POLYPR SHLD REG BVL

## (undated) DEVICE — TUBING SUCT 12FR MAL ALUM SHFT FN CAP VENT UNIV CONN W/ OBT

## (undated) DEVICE — SOLIDIFIER FLUID 3000 CC ABSORB

## (undated) DEVICE — DRAPE EQUIP CARM 72X42 IN RUBBER BND CLP

## (undated) DEVICE — BW-412T DISP COMBO CLEANING BRUSH: Brand: SINGLE USE COMBINATION CLEANING BRUSH

## (undated) DEVICE — SYR 50ML SLIP TIP NSAF LF STRL --

## (undated) DEVICE — TOTAL TRAY, 16FR 10ML SIL FOLEY, URN: Brand: MEDLINE

## (undated) DEVICE — Z DISCONTINUED USE 2272114 DRAPE SURG UTIL 26X15 IN W/ TAPE N INVASIVE MULTLYR DISP

## (undated) DEVICE — BAG BELONG PT PERS CLEAR HANDL

## (undated) DEVICE — GLOVE ORANGE PI 7 1/2   MSG9075

## (undated) DEVICE — Device: Brand: MEDICAL ACTION INDUSTRIES